# Patient Record
Sex: FEMALE | Race: WHITE | NOT HISPANIC OR LATINO | Employment: OTHER | URBAN - NONMETROPOLITAN AREA
[De-identification: names, ages, dates, MRNs, and addresses within clinical notes are randomized per-mention and may not be internally consistent; named-entity substitution may affect disease eponyms.]

---

## 2018-03-01 ENCOUNTER — OFFICE VISIT (OUTPATIENT)
Dept: ORTHOPEDIC SURGERY | Facility: CLINIC | Age: 78
End: 2018-03-01

## 2018-03-01 VITALS — WEIGHT: 146.4 LBS | BODY MASS INDEX: 26.94 KG/M2 | HEIGHT: 62 IN

## 2018-03-01 DIAGNOSIS — M17.0 PRIMARY OSTEOARTHRITIS OF BOTH KNEES: ICD-10-CM

## 2018-03-01 DIAGNOSIS — M25.561 RIGHT KNEE PAIN, UNSPECIFIED CHRONICITY: Primary | ICD-10-CM

## 2018-03-01 PROCEDURE — 99214 OFFICE O/P EST MOD 30 MIN: CPT | Performed by: ORTHOPAEDIC SURGERY

## 2018-03-01 PROCEDURE — 73560 X-RAY EXAM OF KNEE 1 OR 2: CPT | Performed by: ORTHOPAEDIC SURGERY

## 2018-03-01 RX ORDER — CLONAZEPAM 0.5 MG/1
TABLET ORAL
Refills: 0 | COMMUNITY
Start: 2018-02-02

## 2018-03-01 NOTE — PROGRESS NOTES
Chief Complaint   Patient presents with   • Right Knee - Follow-up           HPI the patient is here today to discuss right knee surgery. Patient is having increasing pain and discomfort with the right knee.  The pain is over the medial aspect of the knee.  She has difficulty going up and down the steps.  Walking on a uneven surfaces bothers her significantly.  Her symptoms have been going on for at least 5-6 years.  She had right knee arthroscopy performed by Dr. Bocanegra which was not helpful in managing her symptoms at all.  In fact, she states that her knee has never been the same since the knee arthroscopy.  She wants to go ahead with a total knee arthroplasty to improve her quality of life at this point.  She does not want intra-articular injections of steroid because they helped her only on a temporary fashion.  The patient cannot take anti-inflammatory medications or pain medication because of severe issues of gastritis and has been prohibited by her primary care physician to take any of those medication.  In fact, postoperatively, there will be an issue because she cannot take any oral medications.  We will have to manage her symptoms with a combination of IV medication and transdermal patches or rectal suppositories.         There were no vitals filed for this visit.        Review of Systems   Constitutional: Negative.    HENT: Negative.    Eyes: Negative.    Respiratory: Negative.    Cardiovascular: Negative.    Gastrointestinal: Negative.    Endocrine: Negative.    Genitourinary: Negative.    Musculoskeletal: Positive for gait problem and joint swelling.   Skin: Negative.    Allergic/Immunologic: Negative.    Hematological: Negative.    Psychiatric/Behavioral: Negative.            Physical Exam   Constitutional: She is oriented to person, place, and time. She appears well-nourished.   HENT:   Head: Atraumatic.   Eyes: EOM are normal.   Neck: Neck supple.   Cardiovascular: Normal heart sounds.     Pulmonary/Chest: Breath sounds normal.   Abdominal: Bowel sounds are normal.   Musculoskeletal: She exhibits edema, tenderness and deformity.   Neurological: She is alert and oriented to person, place, and time. She has normal reflexes.   Skin: Skin is dry.   Psychiatric: She has a normal mood and affect. Her behavior is normal. Judgment and thought content normal.   Nursing note and vitals reviewed.          Joint/Body Part Specific Exam:  right knee. Patient has crepitus throughout range of motion. Positive patellar grind test. Mild effusion. Lachman is negative. Pivot shift is negative. Anterior and posterior drawer signs are negative. Significant joint line tenderness is noted on the medial aspect of the knee. Patient has a varus orientation of the knee. There is fullness and tenderness in the Popliteal fossa. Mild distention of a Popliteal cyst is noted in this location. Range of motion in flexion is from 0- 110 degrees. Neurovascular status is intact.  Dorsalis pedis and posterior tibial artery pulses are palpable. Common peroneal nerve function is well preserved. Patient's gait is cautious and antalgic. Skin and soft tissues are mildly swollen, consistent with synovitis and effusion. The patient has a significant limp with the first few steps after starting the gait cycle. Getting out of a chair takes a lot of effort due to pain on knee flexion.      X-RAY Report:  Knee films from  the previous visit are reviewed and show a complete loss of medial joint space with osteophyte formation.    right Knee X-Ray  Indication: Pain with varus deformity of the knee  AP, Lateral views  Findings: A degenerative osteoarthritis of the knee with complete loss of medial joint space  no bony lesion  Soft tissues within normal limits  decreased joint spaces  Hardware appropriately positioned not applicable      yes prior studies available for comparison.    X-RAY was ordered and reviewed by Ryan Chow  MD  Diagnostics:        Eve was seen today for follow-up.    Diagnoses and all orders for this visit:    Right knee pain, unspecified chronicity  -     XR Knee 1 or 2 View Right    Primary osteoarthritis of both knees          Procedures        Plan:  Use a supportive brace to prevent the knee from buckling and giving out.    Tablet ibuprofen 600 mg orally twice a day for pain swelling and discomfort.    She states that she cannot take any NSAIDs or pain medication because of upper GI issues including bleeding.    Home-based exercise program with stretching and strengthening exercises of the quads and the hamstrings.    Patient wants to go ahead and improve her pain profile and her quality of life and therefore wants to now proceed with a total knee arthroplasty.    Issues with regards to the performance of the right total knee arthroplasty discussed with the patient in great detail.    Time spent in the office today with the patient is 30 minutes of which greater than 50% of my time was spent face-to-face counseling the patient about all the questions regarding the knee replacement and the modalities of care because she cannot take any pain medication by mouth.  We discussed the options of trans-dermal patches as well as rectal suppositories to help manage her pain and symptoms.    The patient was seen today for preoperative discussion.  The patient has been tried on over-the-counter and prescription NSAID's despite the risks of anti-inflammatory bleeding, peptic ulcers and erosive gastritis with short term benefit only.  Braces have been prescribed for mechanical support.  Patient has been participating in an exercise program specifically targeting joint pain relief with limited benefit. Intraarticular injections have been used periodically with some but not complete relief of pain.  Ambulation aids have also been utilized.      The details of the surgical procedure were explained including the location of  probable incisions and a description of the likely hardware/grafts to be used. The patient understands the likely convalescence after surgery as well as the rehabilitation required.  Also, we have thoroughly discussed with the patient the risks, benefits and alternatives to surgery.  Risks include but are not limited to the risk of infection, joint stiffness, limited range of motion, wound healing problems, scar tissue build up, myocardial infarction, stroke, blood clots (including DVT and/or pulmonary embolus along with the risk of death) neurologic and/or vascular injury, limb length discrepancy, fracture, dislocation, nonunion, malunion, continued pain and need for further surgery including hardware failure requiring revision.

## 2018-03-19 ENCOUNTER — TELEPHONE (OUTPATIENT)
Dept: ORTHOPEDIC SURGERY | Facility: CLINIC | Age: 78
End: 2018-03-19

## 2018-03-19 NOTE — TELEPHONE ENCOUNTER
Patient has had something else come up and does not want to be scheduled for surgery at this time. She will call back when she wants to proceed/rj

## 2018-04-10 ENCOUNTER — OFFICE VISIT (OUTPATIENT)
Dept: ORTHOPEDIC SURGERY | Facility: CLINIC | Age: 78
End: 2018-04-10

## 2018-04-10 VITALS — WEIGHT: 144 LBS | HEIGHT: 62 IN | BODY MASS INDEX: 26.5 KG/M2 | TEMPERATURE: 97.6 F

## 2018-04-10 DIAGNOSIS — M25.551 PAIN OF RIGHT HIP JOINT: Primary | ICD-10-CM

## 2018-04-10 PROCEDURE — 99213 OFFICE O/P EST LOW 20 MIN: CPT | Performed by: ORTHOPAEDIC SURGERY

## 2018-04-10 PROCEDURE — 73501 X-RAY EXAM HIP UNI 1 VIEW: CPT | Performed by: ORTHOPAEDIC SURGERY

## 2018-04-10 NOTE — PROGRESS NOTES
Chief Complaint   Patient presents with   • Right Hip - Pain             HPI  Established patient returns with right hip pain. She states that the pain is on the lateral aspect of her right hip.  She does have some left hip pain as well but it is very minimal.  The pain radiates from the base of the greater trochanter to the lateral side of the femur.  She denies any history of trauma.  She does not have any risk factors for avascular necrosis.  He does have symptoms of radiculopathy which radiated down the lower extremity.  By the end of the day she has a fairly significant limp because of the pain and discomfort.          Allergies   Allergen Reactions   • Pain & Fever [Acetaminophen] GI Intolerance     All pain medications hurts the patient stomach. Can only take tylenol - that sometimes upsets her stomach-         Social History     Social History   • Marital status:      Spouse name: N/A   • Number of children: N/A   • Years of education: N/A     Occupational History   • Not on file.     Social History Main Topics   • Smoking status: Never Smoker   • Smokeless tobacco: Not on file   • Alcohol use No   • Drug use: Unknown   • Sexual activity: Not on file     Other Topics Concern   • Not on file     Social History Narrative   • No narrative on file       History reviewed. No pertinent family history.    Past Surgical History:   Procedure Laterality Date   • APPENDECTOMY     • CHOLECYSTECTOMY     • HYSTERECTOMY         Past Medical History:   Diagnosis Date   • Osteoporosis            Vitals:    04/10/18 1440   Temp: 97.6 °F (36.4 °C)             Review of Systems   Constitutional: Negative.    HENT: Negative.    Eyes: Negative.    Respiratory: Negative.    Cardiovascular: Negative.    Gastrointestinal: Negative.    Endocrine: Negative.    Genitourinary: Negative.    Musculoskeletal: Positive for gait problem and joint swelling.   Skin: Negative.    Allergic/Immunologic: Negative.    Hematological:  Negative.    Psychiatric/Behavioral: Negative.            Physical Exam   Constitutional: She is oriented to person, place, and time. She appears well-nourished.   HENT:   Head: Atraumatic.   Eyes: EOM are normal.   Neck: Neck supple.   Cardiovascular: Normal rate, regular rhythm, normal heart sounds and intact distal pulses.    Pulmonary/Chest: Breath sounds normal.   Abdominal: Bowel sounds are normal.   Musculoskeletal: She exhibits edema and tenderness.   Neurological: She is alert and oriented to person, place, and time. She has normal reflexes.   Skin: Skin is warm.   Psychiatric: She has a normal mood and affect. Her behavior is normal. Judgment and thought content normal.   Nursing note and vitals reviewed.              Joint/Body Part Specific Exam:  bilateral hip. Neurovascular status is intact. Patient has a distant limp on the affected side. Internal and external rotations are associated with pain and discomfort. Anterior joint line pain and tenderness is significant. Stinchfield sign is positive. Figure of 4 sign is positive. Patient is unable to perform an active straight leg raise exam. Greater trochanter is tender. Crossover adduction test is positive. Cross body adduction is limited and painful for the patient. Patient has very significant limp and joint line tenderness anteriorly, posteriorly and medially. Dorsalis pedis and posterior tibial artery pulses are palpable. Common peroneal nerve function is well preserved.          X-RAY Report:  right Hip X-Ray  Indication: Pain on the lateral aspect of the right hip  AP and lateral  Findings: Moderately advanced osteoarthritis with narrowing of the lateral joint space  no bony lesion  Soft tissues within normal limits  within normal limits joint spaces  Hardware appropriately positioned not applicable      no prior studies available for comparison.    X-RAY was ordered and reviewed by Ryan Chow MD              Diagnostics:            Eve was seen  today for pain.    Diagnoses and all orders for this visit:    Pain of right hip joint  -     XR Hip With or Without Pelvis 1 View Right            Procedures        PLAN:  I provided this patient with educational materials regarding bone health.  Tablet ibuprofen 600 mg orally twice a day for pain swelling and discomfort.    Stretching and strengthening exercises of the hip including the IT band.    Calcium and vitamin D for bone health.    Steroid injection to the base of the greater trochanter discussed with the patient and at next visit I will be glad to provide her with that service based on the progression of her pain and discomfort.    Nonoperative care were the hip at this point but eventually she might need a hip replacement surgery.    Follow-up in my office in 3 months.    [employer] exercise program for the lumbar spine.          CC To [facility] [place]

## 2018-04-17 PROBLEM — M25.551 PAIN OF RIGHT HIP JOINT: Status: ACTIVE | Noted: 2018-04-17

## 2018-07-17 ENCOUNTER — OFFICE VISIT (OUTPATIENT)
Dept: ORTHOPEDIC SURGERY | Facility: CLINIC | Age: 78
End: 2018-07-17

## 2018-07-17 VITALS — BODY MASS INDEX: 24.84 KG/M2 | HEIGHT: 62 IN | WEIGHT: 135 LBS

## 2018-07-17 DIAGNOSIS — M17.0 PRIMARY OSTEOARTHRITIS OF BOTH KNEES: Primary | ICD-10-CM

## 2018-07-17 PROCEDURE — 99214 OFFICE O/P EST MOD 30 MIN: CPT | Performed by: ORTHOPAEDIC SURGERY

## 2018-07-17 RX ORDER — OMEPRAZOLE 40 MG/1
CAPSULE, DELAYED RELEASE ORAL
COMMUNITY
Start: 2018-05-01

## 2018-07-17 NOTE — PROGRESS NOTES
Chief Complaint   Patient presents with   • Right Knee - Follow-up           HPI  The patient is here today for a follow up of her right knee Pain and discomfort and progressive deformity.  Although she has symptoms on both her knees, the right side is more symptomatic than the left.  She has a progressive varus deformity of both the knees.  She has difficulty in going up and down the steps.  She has difficulty in squatting on the ground.  She states that she cannot walk without pain and therefore she cannot exercise.  She would like to get outside the house and improve her cardiovascular conditioning.  The patient states that anti-inflammatory medication is no longer helping her manage her symptoms and she would like to consider knee replacement surgery at this point.  The pain bothers her when she is walking up and down the steps and also when she is squatting on the ground.  Occasionally, the knee will buckle and give out from underneath her.      There were no vitals filed for this visit.        Review of Systems   Constitutional: Negative.    HENT: Negative.    Eyes: Negative.    Respiratory: Negative.    Cardiovascular: Negative.    Gastrointestinal: Negative.    Endocrine: Negative.    Genitourinary: Negative.    Musculoskeletal: Positive for gait problem and joint swelling.   Skin: Negative.    Allergic/Immunologic: Negative.    Hematological: Negative.    Psychiatric/Behavioral: Negative.            Physical Exam   Constitutional: She is oriented to person, place, and time. She appears well-developed and well-nourished.   HENT:   Head: Atraumatic.   Eyes: EOM are normal.   Neck: Neck supple.   Cardiovascular: Regular rhythm, normal heart sounds and intact distal pulses.    Pulmonary/Chest: Breath sounds normal.   Abdominal: Bowel sounds are normal.   Musculoskeletal: She exhibits edema and tenderness.   Neurological: She is alert and oriented to person, place, and time.   Skin: Skin is warm. Capillary  refill takes 2 to 3 seconds.   Psychiatric: She has a normal mood and affect. Her behavior is normal. Thought content normal.   Nursing note and vitals reviewed.          Joint/Body Part Specific Exam:  bilateral knee. Patient has crepitus throughout range of motion. Positive patellar grind test. Mild effusion. Lachman is negative. Pivot shift is negative. Anterior and posterior drawer signs are negative. Significant joint line tenderness is noted on the medial aspect of the knee. Patient has a varus orientation of the knee. There is fullness and tenderness in the Popliteal fossa. Mild distention of a Popliteal cyst is noted in this location. Range of motion in flexion is from 0- 110 degrees. Neurovascular status is intact.  Dorsalis pedis and posterior tibial artery pulses are palpable. Common peroneal nerve function is well preserved. Patient's gait is cautious and antalgic. Skin and soft tissues are mildly swollen, consistent with synovitis and effusion. The patient has a significant limp with the first few steps after starting the gait cycle. Getting out of a chair takes a lot of effort due to pain on knee flexion.  The patient's symptoms and clinical findings are much more marked on the right knee as opposed to the left side.      X-RAY Report:  Previously obtained x-rays are reviewed and show a complete loss of medial joint space with osteophyte formation.  The patellofemoral distance is completely obliterated as well.      Diagnostics:        Eve was seen today for follow-up.    Diagnoses and all orders for this visit:    Primary osteoarthritis of both knees            Procedures        Plan:  She has tried several modalities of nonoperative care including injections to the knee as well as Synvisc Visco supplementation.    She now wants to proceed with total knee arthroplasty because her symptoms are progressively getting worse.  She has difficulty going up and down steps and would like to walk for  exercise.    Tablet ibuprofen 600 mg orally twice a day for pain swelling and discomfort.    We will go ahead and schedule her for a right total knee arthroplasty in the near future.    Extensive discussion with the patient about risks and benefits and potential complications of the surgical procedure.  Time spent in the office today with the patient discussing these options.  Greater than 50% of my time was spent with the patient face-to-face going over the indications for the surgical decision making process.    The patient was seen today for preoperative discussion.  The patient has been tried on over-the-counter and prescription NSAID's despite the risks of anti-inflammatory bleeding, peptic ulcers and erosive gastritis with short term benefit only.  Braces have been prescribed for mechanical support.  Patient has been participating in an exercise program specifically targeting joint pain relief with limited benefit. Intraarticular injections have been used periodically with some but not complete relief of pain.  Ambulation aids have also been utilized.      The details of the surgical procedure were explained including the location of probable incisions and a description of the likely hardware/grafts to be used. The patient understands the likely convalescence after surgery as well as the rehabilitation required.  Also, we have thoroughly discussed with the patient the risks, benefits and alternatives to surgery.  Risks include but are not limited to the risk of infection, joint stiffness, limited range of motion, wound healing problems, scar tissue build up, myocardial infarction, stroke, blood clots (including DVT and/or pulmonary embolus along with the risk of death) neurologic and/or vascular injury, limb length discrepancy, fracture, dislocation, nonunion, malunion, continued pain and need for further surgery including hardware failure requiring revision.

## 2018-07-31 ENCOUNTER — TELEPHONE (OUTPATIENT)
Dept: ORTHOPEDIC SURGERY | Facility: CLINIC | Age: 78
End: 2018-07-31

## 2018-08-15 ENCOUNTER — TELEPHONE (OUTPATIENT)
Dept: ORTHOPEDIC SURGERY | Facility: CLINIC | Age: 78
End: 2018-08-15

## 2018-09-10 ENCOUNTER — OUTSIDE FACILITY SERVICE (OUTPATIENT)
Dept: ORTHOPEDIC SURGERY | Facility: CLINIC | Age: 78
End: 2018-09-10

## 2018-09-10 PROCEDURE — 27447 TOTAL KNEE ARTHROPLASTY: CPT | Performed by: ORTHOPAEDIC SURGERY

## 2018-09-10 PROCEDURE — 20985 CPTR-ASST DIR MS PX: CPT | Performed by: ORTHOPAEDIC SURGERY

## 2018-09-17 RX ORDER — OXYCODONE HYDROCHLORIDE AND ACETAMINOPHEN 5; 325 MG/1; MG/1
1 TABLET ORAL EVERY 6 HOURS PRN
Qty: 40 TABLET | Refills: 0 | Status: SHIPPED | OUTPATIENT
Start: 2018-09-17 | End: 2018-09-21 | Stop reason: SDUPTHER

## 2018-09-17 NOTE — TELEPHONE ENCOUNTER
Patient is requesting a refill of Percocet 5/325 MG she has about 9 pills left but wanted to make sure she did not run out. She is s/p TKA sx 9/10/18. Please Advise

## 2018-09-21 ENCOUNTER — OFFICE VISIT (OUTPATIENT)
Dept: ORTHOPEDIC SURGERY | Facility: CLINIC | Age: 78
End: 2018-09-21

## 2018-09-21 DIAGNOSIS — Z96.651 S/P TKR (TOTAL KNEE REPLACEMENT), RIGHT: Primary | ICD-10-CM

## 2018-09-21 PROCEDURE — 99024 POSTOP FOLLOW-UP VISIT: CPT | Performed by: ORTHOPAEDIC SURGERY

## 2018-09-21 RX ORDER — OXYCODONE HYDROCHLORIDE AND ACETAMINOPHEN 5; 325 MG/1; MG/1
1 TABLET ORAL EVERY 6 HOURS PRN
Qty: 40 TABLET | Refills: 0 | Status: SHIPPED | OUTPATIENT
Start: 2018-09-21 | End: 2018-10-04

## 2018-09-21 NOTE — PROGRESS NOTES
Chief Complaint   Patient presents with   • Right Knee - Post-op         HPI the patient is here today for a follow up of her right total knee arthroplasty that was done on 09/10/18.  The patient is doing extremely well after total knee arthroplasty.  Her range of motion has improved significantly.  The patient is participating in a physical therapy protocol.  There are no fevers, rigors or chills.  Refill is 2 seconds with a brisk return.  She has completed her course of xarelto for DVT prophylaxis.  She is very pleased with her outcome in terms of improvement mobility and decreased pain from the knee.        There were no vitals filed for this visit.        Joint/Body Part Specific Exam:  right knee.The patient is status post total knee arthroplasty postoperative 1.5 week(s). Incision is clean. Calf is soft and nontender. Homans sign is negative. There is no clicking, popping or catching. Anterior and posterior drawer signs are negative.  There is no instability of the components. Appropriate amounts of swelling and bruising are noted. Dorsalis pedis and posterior tibial artery pulses are palpable. Common peroneal nerve function is well preserved. Range of motion is from 10- 90° degrees of flexion. Gait is cautious but otherwise fairly normal. There is no evidence of a deep seated joint infection.      X-RAY REPORT:        Eve was seen today for post-op.    Diagnoses and all orders for this visit:    S/P TKR (total knee replacement), right  -     Ambulatory Referral to Physical Therapy Evaluate and treat    Other orders  -     oxyCODONE-acetaminophen (PERCOCET) 5-325 MG per tablet; Take 1 tablet by mouth Every 6 (Six) Hours As Needed (S/P TKA).            Procedures        Instructions: Incision care.    Ace wrap from toes to groin to help decrease the swelling and minimize the possibility of a DVT.    Tablet aspirin 325 mg tab 1 by mouth daily for DVT prophylaxis.    Tablet Percocet 5/325 mg tab 1 by mouth every  6 when necessary pain and discomfort total 40 pills no refills.    Continue with aggressive physical therapy to the knee following the total knee arthroplasty protocol.    Follow-up in my office in 4 weeks for reevaluation.    Controlled substance treatment options discussed in detail. Patient's signed consent to medical options on file. FREDERIC form in chart.  The patient is being provided this narcotic prescription to address the acute medical condition that they are undergoing/experiencing at this time.  It is my medical and surgical assessment that more than 3 days of narcotic medication is necessary to help control the pain and discomfort that this patient is experiencing at this point.  Risks of narcotic medication usage outlined.  Possibility of physical and psychological dependence and abuse, especially long term, emphasized to the patient.  I have explained to the patient that we will try to wean them off the narcotic medication as soon as possible and introduce non-narcotic modalities of pain control.  At this point in the patient's clinical spectrum, however, alternative available treatments are inadequate to control their pain and symptoms.  I have also discussed the possibility of random drug testing as well as pill counts to prevent misuse and misappropriation of the narcotic medications prescribed to the patient.      Plan:

## 2018-09-25 PROBLEM — Z96.651 S/P TKR (TOTAL KNEE REPLACEMENT), RIGHT: Status: ACTIVE | Noted: 2018-09-25

## 2018-10-04 RX ORDER — OXYCODONE HYDROCHLORIDE AND ACETAMINOPHEN 5; 325 MG/1; MG/1
TABLET ORAL
Qty: 40 TABLET | Refills: 0 | Status: SHIPPED | OUTPATIENT
Start: 2018-10-04 | End: 2018-10-16 | Stop reason: CLARIF

## 2018-10-04 NOTE — TELEPHONE ENCOUNTER
Patient is requesting a refill of pain medication. Percocet 5/325MG one by mouth 6-8 hours #40. She is requesting a physical therapy or to [facility]. Order has been faxed. Waiting for Dr. Chow to sign Rx

## 2018-10-16 RX ORDER — OXYCODONE HYDROCHLORIDE AND ACETAMINOPHEN 5; 325 MG/1; MG/1
1 TABLET ORAL EVERY 8 HOURS PRN
Qty: 40 TABLET | Refills: 0 | Status: SHIPPED | OUTPATIENT
Start: 2018-10-16

## 2018-10-16 NOTE — TELEPHONE ENCOUNTER
Okay to refill this medication for the patient.  Tablet Percocet 5/325 mg tab 1 by mouth every 8 for pain swelling and discomfort.  Total 40 pills no refills.

## 2018-10-19 ENCOUNTER — OFFICE VISIT (OUTPATIENT)
Dept: ORTHOPEDIC SURGERY | Facility: CLINIC | Age: 78
End: 2018-10-19

## 2018-10-19 DIAGNOSIS — Z96.651 S/P TKR (TOTAL KNEE REPLACEMENT), RIGHT: Primary | ICD-10-CM

## 2018-10-19 PROCEDURE — 99024 POSTOP FOLLOW-UP VISIT: CPT | Performed by: ORTHOPAEDIC SURGERY

## 2018-10-19 RX ORDER — MELOXICAM 7.5 MG/1
7.5 TABLET ORAL NIGHTLY PRN
Qty: 90 TABLET | Refills: 0 | Status: SHIPPED | OUTPATIENT
Start: 2018-10-19 | End: 2019-01-14 | Stop reason: SDUPTHER

## 2018-10-19 NOTE — PROGRESS NOTES
Chief Complaint   Patient presents with   • Right Knee - Post-op   • Wound Check         HPI the patient is here today for a follow up of her right total knee arthroplasty that was done on 9/10/18.  The patient is doing very well postoperatively.  Her pain is very well controlled after the knee replacement.  She has no fevers, rigors or chills.  She is very pleased with her ability to walk without too much pain or discomfort.  She has completed her course of xarelto for DVT prophylaxis.  She is wanting to go back on her anti-inflammatory medication because the other joints are bothering her with inflammatory symptoms.        There were no vitals filed for this visit.        Joint/Body Part Specific Exam:  right knee.The patient is status post total knee arthroplasty postoperative 7 week(s). Incision is clean. Calf is soft and nontender. Homans sign is negative. There is no clicking, popping or catching. Anterior and posterior drawer signs are negative.  There is no instability of the components. Appropriate amounts of swelling and bruising are noted. Dorsalis pedis and posterior tibial artery pulses are palpable. Common peroneal nerve function is well preserved. Range of motion is from 0- 95 degrees of flexion. Gait is cautious but otherwise fairly normal. There is no evidence of a deep seated joint infection.      X-RAY REPORT:        Eve was seen today for wound check and post-op.    Diagnoses and all orders for this visit:    S/P TKR (total knee replacement), right    Other orders  -     meloxicam (MOBIC) 7.5 MG tablet; Take 1 tablet by mouth At Night As Needed for Moderate Pain .            Procedures        Instructions:      Plan: Incision care.    Continue with aggressive physical therapy including stretching and strengthening of the quads and the hamstrings.    Ace wrap from toes to groin to help decrease the swelling and minimize the possibility of a DVT.    Falls precaution.    Tablet mobic 7.5 mg tab 1  by mouth daily at bedtime for pain swelling and discomfort.    She is not asking for any narcotic medication at this point because she is doing quite well postoperatively.    Follow-up in my office in 6-8 weeks.

## 2018-10-29 NOTE — TELEPHONE ENCOUNTER
PATIENT IS REQUESTING SOMETHING FOR PAIN. SHE SAYS PHYSICAL THERAPY IS REALLY CHALLENGING FOR HER.     PLEASE ADVISE

## 2018-10-29 NOTE — TELEPHONE ENCOUNTER
Please call her in a prescription of Norco 5/325 mg tab 1 by mouth Q8 to 12 when necessary pain total 30 pills no refills.

## 2018-10-30 RX ORDER — HYDROCODONE BITARTRATE AND ACETAMINOPHEN 5; 325 MG/1; MG/1
TABLET ORAL
Qty: 30 TABLET | Refills: 0 | Status: SHIPPED | OUTPATIENT
Start: 2018-10-30 | End: 2018-11-13 | Stop reason: SDUPTHER

## 2018-11-13 ENCOUNTER — TELEPHONE (OUTPATIENT)
Dept: ORTHOPEDIC SURGERY | Facility: CLINIC | Age: 78
End: 2018-11-13

## 2018-11-13 RX ORDER — HYDROCODONE BITARTRATE AND ACETAMINOPHEN 5; 325 MG/1; MG/1
TABLET ORAL
Qty: 30 TABLET | Refills: 0 | Status: SHIPPED | OUTPATIENT
Start: 2018-11-13

## 2018-11-13 NOTE — TELEPHONE ENCOUNTER
Please send me a request through electronic prescription and I will check off on it with my phone thank you

## 2018-11-13 NOTE — TELEPHONE ENCOUNTER
PATIENT CALLED AND IS REQUESTING A REFILL ON HER PAIN MEDICATION (HYDROCODONE 5/325).  PATIENT IS S/P RIGHT TOTAL KNEE REPLACEMENT ON 9/10/18./KOF

## 2018-12-07 ENCOUNTER — OFFICE VISIT (OUTPATIENT)
Dept: ORTHOPEDIC SURGERY | Facility: CLINIC | Age: 78
End: 2018-12-07

## 2018-12-07 DIAGNOSIS — Z96.651 S/P TKR (TOTAL KNEE REPLACEMENT), RIGHT: Primary | ICD-10-CM

## 2018-12-07 DIAGNOSIS — M25.551 PAIN OF RIGHT HIP JOINT: ICD-10-CM

## 2018-12-07 PROCEDURE — 99024 POSTOP FOLLOW-UP VISIT: CPT | Performed by: ORTHOPAEDIC SURGERY

## 2018-12-07 NOTE — PROGRESS NOTES
Chief Complaint   Patient presents with   • Right Knee - Post-op         HPI  The patient is here today for a follow up of her right total knee arthroplasty that was done on 9/10/18.  The patient has done well status post total knee arthroplasty.  Range of motion is progressively improving.  She is in a physical therapy program which is helping her to improve her range of motion and to increase her walking distance.  She states that she is very pleased with her postoperative outcome.  The patient states that she is having pain and discomfort on the base of the right hip over the greater trochanteric bursa.  Cross body activities bother the patient to some degree.  Symptoms are worse on the lateral side of the femur.  She has a difficult time sleeping in bed at night because of the trochanteric bursal pain.      There were no vitals filed for this visit.        Joint/Body Part Specific Exam:right knee.The patient is status post total knee arthroplasty postoperative 3 month(s). Incision is clean. Calf is soft and nontender. Homans sign is negative. There is no clicking, popping or catching. Anterior and posterior drawer signs are negative.  There is no instability of the components. Appropriate amounts of swelling and bruising are noted. Dorsalis pedis and posterior tibial artery pulses are palpable. Common peroneal nerve function is well preserved. Range of motion is from 10- 120 degrees of flexion. Gait is cautious but otherwise fairly normal. There is no evidence of a deep seated joint infection.  Right hip:  Skin and soft tissues over the greater trochanteric bursa are painful and tender for the patient. Neurovascular status is intact. IT band is painful and tender. Cross body adduction bothers the patient significantly. Internal and external rotations bother the patient significantly with tenderness over the greater trochanter. There is no clinical deformity. No shortening. The patient does have a significant  limp because of the trochanteric pain caused by the abductors. The piriformis is tight and with any rotation there is significant capsular tightness. Dorsalis pedis and posterior tibal artery pulses are palpable. Capillary refill is 2 seconds with a brisk return. Common peroneal nerve function is well preserved.    X-RAY REPORT:        Eve was seen today for post-op.    Diagnoses and all orders for this visit:    S/P TKR (total knee replacement), right    Pain of right hip joint            Procedures        Instructions:      Plan: Stretching exercises of the quads and the hamstrings to improve the range of motion of the knee.    Continue with the rehabilitation of the total knee arthroplasty.    Falls precautions.    Tablet ibuprofen 600 mg orally twice a day for pain swelling and discomfort.    Stretching exercises of the hip and the IT band to decrease the pain profiles over the greater trochanteric bursa.    Steroid injection offered to the patient for the trochanteric bursal pain on the right hip.    Follow-up in my office in 3 months for reevaluation of the knee arthroplasty and possible steroid injection to the hip over the greater trochanteric bursa.

## 2019-01-14 RX ORDER — MELOXICAM 7.5 MG/1
7.5 TABLET ORAL NIGHTLY PRN
Qty: 90 TABLET | Refills: 0 | Status: SHIPPED | OUTPATIENT
Start: 2019-01-14 | End: 2019-04-13 | Stop reason: SDUPTHER

## 2019-03-14 ENCOUNTER — OFFICE VISIT (OUTPATIENT)
Dept: ORTHOPEDIC SURGERY | Facility: CLINIC | Age: 79
End: 2019-03-14

## 2019-03-14 DIAGNOSIS — Z96.651 S/P TKR (TOTAL KNEE REPLACEMENT), RIGHT: Primary | ICD-10-CM

## 2019-03-14 PROCEDURE — 99213 OFFICE O/P EST LOW 20 MIN: CPT | Performed by: ORTHOPAEDIC SURGERY

## 2019-03-14 NOTE — PROGRESS NOTES
FOLLOW UP VISIT    Eve Fung:  ?  1940:  ?  Chief Complaint   Patient presents with   • Right Knee - Follow-up      ?  HPI:  Patient presents today for 6 month follow up of right total knee arthroplasty. Incision has healed nicely without any sign of infection. She is progressing appropriately but does complain of a little ache in the knee.  She has done very well with her knee replacement which was performed by me in September 2018.  She states her quality of life improved tremendously after the knee surgery.  She is able to walk for exercise and does not have any issues with the knee replacement itself.  She is having some chronic low back pain issues with radiculopathy radiating into the right lower extremity.  The patient states that she would like to be referred to the pain clinic for [facility] to manage her symptoms.  I certainly agree with that line of management.    This patient is an established patient.  This problem is not new to this examiner.    Review of Systems   Constitutional: Negative.  Negative for fever.   Eyes: Negative.    Respiratory: Negative.    Cardiovascular: Negative.    Endocrine: Negative.    Musculoskeletal: Positive for arthralgias. Negative for gait problem and joint swelling.   Skin: Negative.  Negative for rash and wound.   Allergic/Immunologic: Negative.    Neurological: Negative for numbness.   Hematological: Negative.    Psychiatric/Behavioral: Negative.            Physical Exam   Constitutional: Patient is oriented to person, place, and time. Appears well-developed and well-nourished.   HENT:   Head: Normocephalic and atraumatic.   Eyes: Conjunctivae and EOM are normal. Pupils are equal, round, and reactive to light.   Cardiovascular: Normal rate, regular rhythm, normal heart sounds and intact distal pulses.   Pulmonary/Chest: Effort normal and breath sounds normal.   Musculoskeletal:   See detailed exam below   Neurological: Alert and oriented to person, place, and time.  No sensory deficit. Coordination normal.   Skin: Skin is warm and dry. Capillary refill takes less than 2 seconds. No rash noted. No erythema.   Psychiatric: Patient has a normal mood and affect. Her behavior is normal. Judgment and thought content normal.   Nursing note and vitals reviewed.    Ortho Exam:  Right knee.The patient is status post total knee arthroplasty postoperative 6 month(s). Incision is clean. Calf is soft and nontender. Homans sign is negative. There is no clicking, popping or catching. Anterior and posterior drawer signs are negative.  There is no instability of the components. Appropriate amounts of swelling and bruising are noted. Dorsalis pedis and posterior tibial artery pulses are palpable. Common peroneal nerve function is well preserved. Range of motion is from 0- 120 degrees of flexion. Gait is cautious but otherwise fairly normal. There is no evidence of a deep seated joint infection.    Lumbar Spine: The overlying skin and soft tissues are mildly swollen. Deep tendon reflexes are bilaterally, symmetric and equal.  No long tract signs are noted. There is No evidence of myelopathy. No bowel or bladder deficit noted. Mild spasm of erector spinae muscle is noted. right sided rotation is associated with pain and discomfort. Straight leg raise test is positive to 60 degrees. Contralateral straight leg raise is negative. Pain radiates to buttock and thigh. Get up and go is slow due to the lumbar pain.No objective motor or sensory function loss is noted.   Diagnostics:  None     Assessment:  Eve was seen today for follow-up.    Diagnoses and all orders for this visit:    S/P TKR (total knee replacement), right    ?  ?  Plan  [employer] exercise program.    , GI and dental procedure prophylaxis with antibiotics to prevent metastatic infection of the knee arthroplasty implants.  · Stretching and strengthening exercises of the quads and hamstrings   · Falls precautions  · Rest, ice,  compression, and elevation (RICE) therapy  · OTC Alternate Ibuprofen and Tylenol as needed  · Follow up in 6 month(s)      Ryan Chow MD  3/24/2019

## 2019-04-15 RX ORDER — MELOXICAM 7.5 MG/1
7.5 TABLET ORAL NIGHTLY PRN
Qty: 90 TABLET | Refills: 0 | Status: SHIPPED | OUTPATIENT
Start: 2019-04-15

## 2019-09-12 ENCOUNTER — OFFICE VISIT (OUTPATIENT)
Dept: ORTHOPEDIC SURGERY | Facility: CLINIC | Age: 79
End: 2019-09-12

## 2019-09-12 VITALS — HEIGHT: 62 IN | BODY MASS INDEX: 25.32 KG/M2 | WEIGHT: 137.6 LBS

## 2019-09-12 DIAGNOSIS — Z96.651 S/P TKR (TOTAL KNEE REPLACEMENT), RIGHT: Primary | ICD-10-CM

## 2019-09-12 PROCEDURE — 99213 OFFICE O/P EST LOW 20 MIN: CPT | Performed by: ORTHOPAEDIC SURGERY

## 2019-09-12 NOTE — PROGRESS NOTES
"FOLLOW UP VISIT    Patient: Eve Fung  ?  YOB: 1940    MRN: [id number]  ?  Chief Complaint   Patient presents with   • Right Knee - Follow-up      ?  HPI: The patient is following up on a right knee arthroplasty which was performed by me in September 2018.  She is now 1 year postop and is doing extremely well.  She states that she is very pleased with her outcome.  She states that her range of motion is improved considerably and she has only episodic soreness.  She states that she does not even remember that she had the knee replacement because her function is near normal.  \"I walk very well after the knee replacement surgery \".  She is here for yearly joint surveillance and evaluation.    Pain Location: right knee(s)  Radiation: none  Quality: dull  Intensity/Severity: None  Timing: intermittent  Aggravating Factors: kneeling, walking/running, squatting, walking or standing for prolonged time  Alleviating Factors: OTC analgesics, NSAID's  Previous Episodes: yes  Associated Symptoms: swelling  ADLs Affected: recreational activities/sports  Previous Treatment: Total knee arthroplasty performed by me on 18 September 2018.    This patient is an established patient.  This problem is not new to this examiner.      Allergies:   Allergies   Allergen Reactions   • Pain & Fever [Acetaminophen] GI Intolerance     All pain medications hurts the patient stomach. Can only take tylenol - that sometimes upsets her stomach-       Medications:   Home Medications:  Current Outpatient Medications on File Prior to Visit   Medication Sig   • acetaminophen (TYLENOL) 325 MG tablet Take 650 mg by mouth every 6 (six) hours as needed for mild pain (1-3).   • clonazePAM (KlonoPIN) 0.5 MG tablet TK 1/2 T PO TID PRN   • HYDROcodone-acetaminophen (NORCO) 5-325 MG per tablet Take one tablet by mouth every 8 to 12 hours prn pain   • meloxicam (MOBIC) 7.5 MG tablet TAKE 1 TABLET BY MOUTH AT NIGHT AS NEEDED FOR MODERATE PAIN   • " "omeprazole (priLOSEC) 40 MG capsule    • oxyCODONE-acetaminophen (PERCOCET) 5-325 MG per tablet Take 1 tablet by mouth Every 8 (Eight) Hours As Needed for Moderate Pain  or Severe Pain .     No current facility-administered medications on file prior to visit.      Current Medications:  Scheduled Meds:  PRN Meds:.    I have reviewed the patient's medical history in detail and updated the computerized patient record.  Review and summarization of old records include:    Past Medical History:   Diagnosis Date   • Osteoporosis      Past Surgical History:   Procedure Laterality Date   • APPENDECTOMY     • CHOLECYSTECTOMY     • HYSTERECTOMY       Social History     Occupational History   • Not on file   Tobacco Use   • Smoking status: Never Smoker   Substance and Sexual Activity   • Alcohol use: No   • Drug use: Not on file   • Sexual activity: Not on file      Social History     Social History Narrative   • Not on file     No family history on file.      Review of Systems   Constitutional: Negative.  Negative for fever.   HENT: Negative.    Eyes: Negative.    Respiratory: Negative.    Cardiovascular: Negative.    Endocrine: Negative.    Genitourinary: Negative.    Musculoskeletal: Positive for arthralgias, gait problem and joint swelling.   Skin: Negative.  Negative for rash and wound.   Allergic/Immunologic: Negative.    Neurological: Negative for numbness.   Hematological: Negative.    Psychiatric/Behavioral: Negative.           Wt Readings from Last 3 Encounters:   09/12/19 62.4 kg (137 lb 9.6 oz)   07/17/18 61.2 kg (135 lb)   04/10/18 65.3 kg (144 lb)     Ht Readings from Last 3 Encounters:   09/12/19 157.5 cm (62\")   07/17/18 157.5 cm (62\")   04/10/18 157.5 cm (62\")     Body mass index is 25.17 kg/m².  Facility age limit for growth percentiles is 20 years.  There were no vitals filed for this visit.      Physical Exam  Constitutional: Patient is oriented to person, place, and time. Appears well-developed and " well-nourished.   HENT:   Head: Normocephalic and atraumatic.   Eyes: Conjunctivae and EOM are normal. Pupils are equal, round, and reactive to light.   Cardiovascular: Normal rate, regular rhythm, normal heart sounds and intact distal pulses.   Pulmonary/Chest: Effort normal and breath sounds normal.   Musculoskeletal:   See detailed exam below   Neurological: Alert and oriented to person, place, and time. No sensory deficit. Coordination normal.   Skin: Skin is warm and dry. Capillary refill takes less than 2 seconds. No rash noted. No erythema.   Psychiatric: Patient has a normal mood and affect. Her behavior is normal. Judgment and thought content normal.   Nursing note and vitals reviewed.      Ortho Exam:     Right knee.The patient is status post total knee arthroplasty postoperative 1 year(s). Incision is clean. Calf is soft and nontender. Homans sign is negative. There is no clicking, popping or catching. Anterior and posterior drawer signs are negative.  There is no instability of the components. Appropriate amounts of swelling and bruising are noted. Dorsalis pedis and posterior tibial artery pulses are palpable. Common peroneal nerve function is well preserved. Range of motion is from 0-125 degrees of flexion. Gait is cautious but otherwise fairly normal. There is no evidence of a deep seated joint infection.      Diagnostics:  no diagnostic testing performed this visit      Assessment:  Eve was seen today for follow-up.    Diagnoses and all orders for this visit:    S/P TKR (total knee replacement), right          Procedures  ?    Plan    · Compression/brace to the opposite knee if needed for support.  · , GI and dental procedure prophylaxis with antibiotics to prevent metastatic infection to the right knee arthroplasty implants.  · Rest, ice, compression, and elevation (RICE) therapy  · Stretching and strengthening exercises of the quads and the hamstrings.  · Tylenol 500-1000mg by mouth every 6  hours as needed for pain   · Follow up in 1 year(s)    Date of encounter: 09/12/2019   Ryan Chow MD

## 2020-02-20 ENCOUNTER — OFFICE VISIT (OUTPATIENT)
Dept: ORTHOPEDIC SURGERY | Facility: CLINIC | Age: 80
End: 2020-02-20

## 2020-02-20 VITALS — BODY MASS INDEX: 26.43 KG/M2 | WEIGHT: 140 LBS | TEMPERATURE: 98 F | HEIGHT: 61 IN

## 2020-02-20 DIAGNOSIS — Z96.651 S/P TKR (TOTAL KNEE REPLACEMENT), RIGHT: Primary | ICD-10-CM

## 2020-02-20 DIAGNOSIS — M18.11 ARTHRITIS OF CARPOMETACARPAL (CMC) JOINT OF RIGHT THUMB: ICD-10-CM

## 2020-02-20 PROCEDURE — 99213 OFFICE O/P EST LOW 20 MIN: CPT | Performed by: ORTHOPAEDIC SURGERY

## 2020-02-20 PROCEDURE — 20600 DRAIN/INJ JOINT/BURSA W/O US: CPT | Performed by: ORTHOPAEDIC SURGERY

## 2020-02-20 RX ADMIN — METHYLPREDNISOLONE ACETATE 80 MG: 80 INJECTION, SUSPENSION INTRA-ARTICULAR; INTRALESIONAL; INTRAMUSCULAR; SOFT TISSUE at 15:10

## 2020-02-20 RX ADMIN — LIDOCAINE HYDROCHLORIDE 2 ML: 10 INJECTION, SOLUTION EPIDURAL; INFILTRATION; INTRACAUDAL; PERINEURAL at 15:10

## 2020-02-20 NOTE — PROGRESS NOTES
FOLLOW UP VISIT    Patient: Eve Fung  ?  YOB: 1940    MRN: [id number]  ?  Chief Complaint   Patient presents with   • Right Knee - Follow-up   CC: Follow-up on right total knee arthroplasty and right thumb CMC joint arthritis.  ?  HPI:   Eve Reis patient is following up on a right total knee arthroplasty performed by me on 18 September 2018.  She states that her knee replacement is doing absolutely great.  She has completed her physical therapy and improved her range of motion of the knee joint to near normal.  She is not taking any pain medication for this knee pathology at this point.  She states that she is walking for exercise and is very pleased with her outcome up to this point.  She states that her right thumb bothers her to some degree.  She has difficulty with grasping objects and difficulty with repetitive use and turning of the upper extremity.  She has classic symptoms of CMC joint arthrosis.  There is a sense of grinding and crepitus at the base of the first metacarpal.  She does have a bony prominence which is most likely an osteophyte at the base of the first metacarpal as well.      Pain Location: right Thumb base finger  Radiation: none  Quality: dull, aching  Intensity/Severity: moderate  Duration: 6 months  Onset quality: gradual   Timing: intermittent  Aggravating Factors: rotating motion, repetitive motion  Alleviating Factors: NSAIDs  Previous Episodes: yes  Associated Symptoms: pain, swelling, clicking/popping  ADLs Affected: grooming/hygiene/toileting/personal care, meal preparation  Previous Treatment: Total knee arthroplasty performed by me about 5 months ago.    This patient is an established patient.  This problem is not new to this examiner.      Allergies:   Allergies   Allergen Reactions   • Pain & Fever [Acetaminophen] GI Intolerance     All pain medications hurts the patient stomach. Can only take tylenol - that sometimes upsets her stomach-        Medications:   Home Medications:  Current Outpatient Medications on File Prior to Visit   Medication Sig   • acetaminophen (TYLENOL) 325 MG tablet Take 650 mg by mouth every 6 (six) hours as needed for mild pain (1-3).   • clonazePAM (KlonoPIN) 0.5 MG tablet TK 1/2 T PO TID PRN   • HYDROcodone-acetaminophen (NORCO) 5-325 MG per tablet Take one tablet by mouth every 8 to 12 hours prn pain   • meloxicam (MOBIC) 7.5 MG tablet TAKE 1 TABLET BY MOUTH AT NIGHT AS NEEDED FOR MODERATE PAIN   • omeprazole (priLOSEC) 40 MG capsule    • oxyCODONE-acetaminophen (PERCOCET) 5-325 MG per tablet Take 1 tablet by mouth Every 8 (Eight) Hours As Needed for Moderate Pain  or Severe Pain .     No current facility-administered medications on file prior to visit.      Current Medications:  Scheduled Meds:  PRN Meds:.    I have reviewed the patient's medical history in detail and updated the computerized patient record.  Review and summarization of old records include:    Past Medical History:   Diagnosis Date   • Osteoporosis      Past Surgical History:   Procedure Laterality Date   • APPENDECTOMY     • CHOLECYSTECTOMY     • HYSTERECTOMY       Social History     Occupational History   • Not on file   Tobacco Use   • Smoking status: Never Smoker   Substance and Sexual Activity   • Alcohol use: No   • Drug use: Not on file   • Sexual activity: Not on file      History reviewed. No pertinent family history.      Review of Systems   Constitutional: Negative.  Negative for fever.   HENT: Negative.    Eyes: Negative.    Respiratory: Negative.    Cardiovascular: Negative.    Gastrointestinal: Negative.    Endocrine: Negative.    Genitourinary: Negative.    Musculoskeletal: Positive for arthralgias, gait problem and joint swelling.   Skin: Negative.  Negative for rash and wound.   Allergic/Immunologic: Negative.    Neurological: Negative for numbness.   Hematological: Negative.    Psychiatric/Behavioral: Negative.           Wt Readings from  "Last 3 Encounters:   02/20/20 63.5 kg (140 lb)   09/12/19 62.4 kg (137 lb 9.6 oz)   07/17/18 61.2 kg (135 lb)     Ht Readings from Last 3 Encounters:   02/20/20 154.9 cm (61\")   09/12/19 157.5 cm (62\")   07/17/18 157.5 cm (62\")     Body mass index is 26.45 kg/m².  Facility age limit for growth percentiles is 20 years.  Vitals:    02/20/20 1449   Temp: 98 °F (36.7 °C)         Physical Exam  Constitutional: Patient is oriented to person, place, and time. Appears well-developed and well-nourished.   HENT:   Head: Normocephalic and atraumatic.   Eyes: Conjunctivae and EOM are normal. Pupils are equal, round, and reactive to light.   Cardiovascular: Normal rate, regular rhythm, normal heart sounds and intact distal pulses.   Pulmonary/Chest: Effort normal and breath sounds normal.   Musculoskeletal:   See detailed exam below   Neurological: Alert and oriented to person, place, and time. No sensory deficit. Coordination normal.   Skin: Skin is warm and dry. Capillary refill takes less than 2 seconds. No rash noted. No erythema.   Psychiatric: Patient has a normal mood and affect. Her behavior is normal. Judgment and thought content normal.   Nursing note and vitals reviewed.      Ortho Exam:   Right hand-CMC joint. The patient is right hand dominant. Tenderness is present at base of the 1st metacarpal. Skin and soft tissues are mildly swollen. Flexor and extensor tendon function is well preserved. Capillary refill is 2 seconds with a brisk return. Axial lading of the joint causes crepitus and pain. Pinch strength is compromised. Slight subluxation of the 1st metacarpal base is noted. Neurovascular status is intact.    Right knee.The patient is status post total knee arthroplasty postoperative 5 month(s). Incision is clean. Calf is soft and nontender. Homans sign is negative. There is no clicking, popping or catching. Anterior and posterior drawer signs are negative.  There is no instability of the components. Appropriate " amounts of swelling and bruising are noted. Dorsalis pedis and posterior tibial artery pulses are palpable. Common peroneal nerve function is well preserved. Range of motion is from 0-120 degrees of flexion. Gait is cautious but otherwise fairly normal. There is no evidence of a deep seated joint infection.      Diagnostics:  no diagnostic testing performed this visit      Assessment:  Eve was seen today for follow-up.    Diagnoses and all orders for this visit:    S/P TKR (total knee replacement), right    Arthritis of carpometacarpal (CMC) joint of right thumb  -     Small Joint Arthrocentesis: R thumb CMC          Small Joint Arthrocentesis: R thumb CMC  Consent given by: patient  Site marked: site marked  Timeout: Immediately prior to procedure a time out was called to verify the correct patient, procedure, equipment, support staff and site/side marked as required   Supporting Documentation  Indications: pain   Procedure Details  Location: thumb - R thumb CMC  Preparation: Patient was prepped and draped in the usual sterile fashion  Needle size: 27 G  Approach: dorsal  Medications administered: 80 mg methylPREDNISolone acetate 80 MG/ML; 2 mL lidocaine PF 1% 1 %  Patient tolerance: patient tolerated the procedure well with no immediate complications        ?    Plan    · Compression/brace to the thumb to minimize the pressure over the base of the first metacarpal.  · , GI and dental procedure prophylaxis with antibiotics to prevent metastatic infection to the knee arthroplasty implants.  · CMC joint arthroplasty and partial trapezoid ectomy discussed with the patient and she tells me that she will let me know when she is ready for that form of surgical intervention based on progression of symptoms of the CMC joint arthrosis.  · Calcium and vitamin D for bone health.  · Glucosamine, chondroitin and turmeric for cartilage health.  · Rest, ice, compression, and elevation (RICE) therapy  · Stretching and  strengthening exercises of the quads and the hamstrings.  · Tylenol 500-1000mg by mouth every 6 hours as needed for pain   · Follow up in 3 month(s)    Date of encounter: 02/20/2020   Ryan Chow MD

## 2020-02-27 PROBLEM — M18.11 ARTHRITIS OF CARPOMETACARPAL (CMC) JOINT OF RIGHT THUMB: Status: ACTIVE | Noted: 2020-02-27

## 2020-02-27 RX ORDER — LIDOCAINE HYDROCHLORIDE 10 MG/ML
2 INJECTION, SOLUTION EPIDURAL; INFILTRATION; INTRACAUDAL; PERINEURAL
Status: COMPLETED | OUTPATIENT
Start: 2020-02-20 | End: 2020-02-20

## 2020-02-27 RX ORDER — METHYLPREDNISOLONE ACETATE 80 MG/ML
80 INJECTION, SUSPENSION INTRA-ARTICULAR; INTRALESIONAL; INTRAMUSCULAR; SOFT TISSUE
Status: COMPLETED | OUTPATIENT
Start: 2020-02-20 | End: 2020-02-20

## 2020-05-26 ENCOUNTER — OFFICE VISIT (OUTPATIENT)
Dept: ORTHOPEDIC SURGERY | Facility: CLINIC | Age: 80
End: 2020-05-26

## 2020-05-26 VITALS — TEMPERATURE: 98.3 F | WEIGHT: 134 LBS | HEIGHT: 62 IN | BODY MASS INDEX: 24.66 KG/M2

## 2020-05-26 DIAGNOSIS — M54.16 CHRONIC RADICULAR LUMBAR PAIN: ICD-10-CM

## 2020-05-26 DIAGNOSIS — M16.0 PRIMARY OSTEOARTHRITIS OF BOTH HIPS: ICD-10-CM

## 2020-05-26 DIAGNOSIS — Z96.651 S/P TKR (TOTAL KNEE REPLACEMENT), RIGHT: ICD-10-CM

## 2020-05-26 DIAGNOSIS — M25.551 PAIN OF RIGHT HIP JOINT: Primary | ICD-10-CM

## 2020-05-26 DIAGNOSIS — G89.29 CHRONIC RADICULAR LUMBAR PAIN: ICD-10-CM

## 2020-05-26 PROCEDURE — 99213 OFFICE O/P EST LOW 20 MIN: CPT | Performed by: ORTHOPAEDIC SURGERY

## 2020-05-26 PROCEDURE — 73502 X-RAY EXAM HIP UNI 2-3 VIEWS: CPT | Performed by: ORTHOPAEDIC SURGERY

## 2020-09-09 DIAGNOSIS — M25.531 BILATERAL WRIST PAIN: Primary | ICD-10-CM

## 2020-09-09 DIAGNOSIS — M25.532 BILATERAL WRIST PAIN: Primary | ICD-10-CM

## 2020-09-09 PROBLEM — M79.609 PAIN IN LIMB: Status: ACTIVE | Noted: 2019-02-25

## 2020-09-09 PROBLEM — G89.29 CHRONIC BACK PAIN: Status: ACTIVE | Noted: 2019-02-25

## 2020-09-09 PROBLEM — M51.36 DEGENERATION OF LUMBAR INTERVERTEBRAL DISC: Status: ACTIVE | Noted: 2020-09-09

## 2020-09-09 PROBLEM — F41.9 ANXIETY: Status: ACTIVE | Noted: 2019-02-25

## 2020-09-09 PROBLEM — M54.9 CHRONIC BACK PAIN: Status: ACTIVE | Noted: 2019-02-25

## 2020-09-10 ENCOUNTER — OFFICE VISIT (OUTPATIENT)
Dept: ORTHOPEDIC SURGERY | Facility: CLINIC | Age: 80
End: 2020-09-10

## 2020-09-10 ENCOUNTER — HOSPITAL ENCOUNTER (OUTPATIENT)
Dept: OTHER | Facility: HOSPITAL | Age: 80
Discharge: HOME | End: 2020-09-10
Attending: ORTHOPAEDIC SURGERY

## 2020-09-10 VITALS — BODY MASS INDEX: 24.48 KG/M2 | WEIGHT: 133 LBS | HEIGHT: 62 IN | TEMPERATURE: 98.2 F

## 2020-09-10 DIAGNOSIS — M18.11 ARTHRITIS OF CARPOMETACARPAL (CMC) JOINT OF RIGHT THUMB: Primary | ICD-10-CM

## 2020-09-10 DIAGNOSIS — M18.12 PRIMARY OSTEOARTHRITIS OF FIRST CARPOMETACARPAL JOINT OF LEFT HAND: ICD-10-CM

## 2020-09-10 PROCEDURE — 99213 OFFICE O/P EST LOW 20 MIN: CPT | Performed by: ORTHOPAEDIC SURGERY

## 2020-09-10 PROCEDURE — 20600 DRAIN/INJ JOINT/BURSA W/O US: CPT | Performed by: ORTHOPAEDIC SURGERY

## 2020-09-10 RX ORDER — METHYLPREDNISOLONE ACETATE 80 MG/ML
160 INJECTION, SUSPENSION INTRA-ARTICULAR; INTRALESIONAL; INTRAMUSCULAR; SOFT TISSUE
Status: COMPLETED | OUTPATIENT
Start: 2020-09-10 | End: 2020-09-10

## 2020-09-10 RX ORDER — LIDOCAINE HYDROCHLORIDE 10 MG/ML
2 INJECTION, SOLUTION INFILTRATION; PERINEURAL
Status: COMPLETED | OUTPATIENT
Start: 2020-09-10 | End: 2020-09-10

## 2020-09-10 RX ORDER — DULOXETIN HYDROCHLORIDE 30 MG/1
CAPSULE, DELAYED RELEASE ORAL
COMMUNITY
Start: 2020-06-16

## 2020-09-10 RX ADMIN — METHYLPREDNISOLONE ACETATE 160 MG: 80 INJECTION, SUSPENSION INTRA-ARTICULAR; INTRALESIONAL; INTRAMUSCULAR; SOFT TISSUE at 15:30

## 2020-09-10 RX ADMIN — LIDOCAINE HYDROCHLORIDE 2 ML: 10 INJECTION, SOLUTION INFILTRATION; PERINEURAL at 15:30

## 2020-09-10 NOTE — PROGRESS NOTES
NEW VISIT    Patient: Eve Fung  ?  YOB: 1940    MRN: [id number]  ?  Chief Complaint   Patient presents with   • Left Wrist - Pain   • Right Wrist - Pain   • Establish Care      ?  HPI: OPNC BILATERAL WRISTS  Eve Reis presents to the office with bilateral thumb pain and discomfort.  She has pain that radiates from the base of the CMC joint to the wrist joint.  The right side is more symptomatic than the left.  She is a very active woman and at age 80 is still actively using her upper extremities for recreational activities.  Pain is based on the CMC joint both on the dorsal as well as on the volar aspect of the CMC joint of both thumbs.  She has difficulty with  strength.  Fine motor activity is somewhat restricted because of limited pain strength.      Pain Location: BILATERAL wrist  Radiation: none  Quality: dull, aching  Intensity/Severity: mild   Duration: several  years  Onset quality: gradual   Timing: intermittent  Aggravating Factors: rotating motion, repetitive motion  Alleviating Factors: rest, ice  Previous Episodes: none mentioned  Associated Symptoms: pain, decreased strength  ADLs Affected: grooming/hygiene/toileting/personal care, recreational activities/sports  Previous Treatment: brace and ice    This patient is an established patient.  This problem is not new to this examiner.      Allergies:   Allergies   Allergen Reactions   • Pain & Fever [Acetaminophen] GI Intolerance     All pain medications hurts the patient stomach. Can only take tylenol - that sometimes upsets her stomach-       Medications:   Home Medications:  Current Outpatient Medications on File Prior to Visit   Medication Sig   • acetaminophen (TYLENOL) 325 MG tablet Take 650 mg by mouth every 6 (six) hours as needed for mild pain (1-3).   • clonazePAM (KlonoPIN) 0.5 MG tablet TK 1/2 T PO TID PRN   • DULoxetine (CYMBALTA) 30 MG capsule TK 1 C PO QD   • HYDROcodone-acetaminophen (NORCO) 5-325 MG per  "tablet Take one tablet by mouth every 8 to 12 hours prn pain   • meloxicam (MOBIC) 7.5 MG tablet TAKE 1 TABLET BY MOUTH AT NIGHT AS NEEDED FOR MODERATE PAIN   • omeprazole (priLOSEC) 40 MG capsule    • oxyCODONE-acetaminophen (PERCOCET) 5-325 MG per tablet Take 1 tablet by mouth Every 8 (Eight) Hours As Needed for Moderate Pain  or Severe Pain .     No current facility-administered medications on file prior to visit.      Current Medications:  Scheduled Meds:  PRN Meds:.    I have reviewed the patient's medical history in detail and updated the computerized patient record.  Review and summarization of old records include:    Past Medical History:   Diagnosis Date   • Osteoporosis      Past Surgical History:   Procedure Laterality Date   • APPENDECTOMY     • CHOLECYSTECTOMY     • HYSTERECTOMY       Social History     Occupational History   • Not on file   Tobacco Use   • Smoking status: Never Smoker   • Smokeless tobacco: Never Used   Substance and Sexual Activity   • Alcohol use: No   • Drug use: Not on file   • Sexual activity: Not on file      History reviewed. No pertinent family history.      Review of Systems   Constitutional: Negative.    HENT: Negative.    Eyes: Negative.    Respiratory: Negative.    Cardiovascular: Negative.    Endocrine: Negative.    Genitourinary: Negative.    Musculoskeletal: Positive for arthralgias and joint swelling.   Skin: Negative.    Allergic/Immunologic: Negative.    Neurological: Negative.    Hematological: Negative.    Psychiatric/Behavioral: Negative.           Wt Readings from Last 3 Encounters:   09/10/20 60.3 kg (133 lb)   05/26/20 60.8 kg (134 lb)   02/20/20 63.5 kg (140 lb)     Ht Readings from Last 3 Encounters:   09/10/20 157.5 cm (62\")   05/26/20 157.5 cm (62\")   02/20/20 154.9 cm (61\")     Body mass index is 24.33 kg/m².  Facility age limit for growth percentiles is 20 years.  Vitals:    09/10/20 1504   Temp: 98.2 °F (36.8 °C)         Physical Exam  Constitutional: " Patient is oriented to person, place, and time. Appears well-developed and well-nourished.   HENT:   Head: Normocephalic and atraumatic.   Eyes: Conjunctivae and EOM are normal. Pupils are equal, round, and reactive to light.   Cardiovascular: Normal rate, regular rhythm, normal heart sounds and intact distal pulses.   Pulmonary/Chest: Effort normal and breath sounds normal.   Musculoskeletal:   See detailed exam below   Neurological: Alert and oriented to person, place, and time. No sensory deficit. Coordination normal.   Skin: Skin is warm and dry. Capillary refill takes less than 2 seconds. No rash noted. No erythema.   Psychiatric: Patient has a normal mood and affect. Her behavior is normal. Judgment and thought content normal.   Nursing note and vitals reviewed.      Ortho Exam:   Bilateral hand-CMC joint. The patient is right hand dominant. Tenderness is present at base of the 1st metacarpal. Skin and soft tissues are mildly swollen. Flexor and extensor tendon function is well preserved. Capillary refill is 2 seconds with a brisk return. Axial lading of the joint causes crepitus and pain. Pinch strength is compromised. Slight subluxation of the 1st metacarpal base is noted. Neurovascular status is intact.      Diagnostics:  Bilateral wrist xrays ap/lateral views were ordered by Ryan Chow MD and performed at [facility]. These images were independently viewed and interpreted by myself, my impression as follows:    bilateral Wrist X-Ray  Indication: pain  AP, Lateral views  Findings: Advanced degenerative osteoarthritis of the CMC joint with bone-on-bone appearance.  no bony lesion  Soft tissues within normal limits  decreased joint spaces  Hardware appropriately positioned not applicable      no prior studies available for comparison.    X-RAY was ordered and reviewed by Ryan Chow MD  Assessment:  Eve was seen today for establish care, pain and pain.    Diagnoses and all orders  for this visit:    Arthritis of carpometacarpal (CMC) joint of right thumb  -     Small Joint Arthrocentesis: R thumb CMC  -     Small Joint Arthrocentesis: L thumb CMC    Primary osteoarthritis of first carpometacarpal joint of left hand  -     Small Joint Arthrocentesis: R thumb CMC  -     Small Joint Arthrocentesis: L thumb CMC          Small Joint Arthrocentesis: R thumb CMC  Consent given by: patient  Site marked: site marked  Timeout: Immediately prior to procedure a time out was called to verify the correct patient, procedure, equipment, support staff and site/side marked as required   Supporting Documentation  Indications: pain   Procedure Details  Location: thumb - R thumb CMC  Preparation: Patient was prepped and draped in the usual sterile fashion  Needle size: 27 G  Medications administered: 2 mL lidocaine 1 %; 160 mg methylPREDNISolone acetate 80 MG/ML  Patient tolerance: patient tolerated the procedure well with no immediate complications    Small Joint Arthrocentesis: L thumb CMC  Consent given by: patient  Site marked: site marked  Timeout: Immediately prior to procedure a time out was called to verify the correct patient, procedure, equipment, support staff and site/side marked as required   Supporting Documentation  Indications: pain   Procedure Details  Location: thumb - L thumb CMC  Preparation: Patient was prepped and draped in the usual sterile fashion  Needle size: 27 G  Medications administered: 2 mL lidocaine 1 %; 160 mg methylPREDNISolone acetate 80 MG/ML  Patient tolerance: patient tolerated the procedure well with no immediate complications        ?    Plan    · Compression/brace to the CMC joint of the thumb.  · Injected patient's right thumb CMC joint with a steroid from a dorsal approach.  · Injected patient's left thumb CMC joint with a steroid from a dorsal approach.  · Gentle active mobilization of the thumb to prevent stiffness.  · Partial trapezoid ectomy and interposition  arthroplasty discussed with the patient but she is not symptomatic enough to require surgical intervention at this point.  · Rest, ice, compression, and elevation (RICE) therapy  · Stretching and strengthening exercises of the flexors and extensors of the thumb.  · Alternate Ibuprofen and Tylenol as needed  · Follow up in 3 month(s)    Date of encounter: 09/10/2020   Ryan Chow MD

## 2020-12-10 ENCOUNTER — OFFICE VISIT (OUTPATIENT)
Dept: ORTHOPEDIC SURGERY | Facility: CLINIC | Age: 80
End: 2020-12-10

## 2020-12-10 VITALS — WEIGHT: 135 LBS | BODY MASS INDEX: 24.84 KG/M2 | HEIGHT: 62 IN | TEMPERATURE: 97.4 F

## 2020-12-10 DIAGNOSIS — M18.11 ARTHRITIS OF CARPOMETACARPAL (CMC) JOINT OF RIGHT THUMB: ICD-10-CM

## 2020-12-10 DIAGNOSIS — M18.12 PRIMARY OSTEOARTHRITIS OF FIRST CARPOMETACARPAL JOINT OF LEFT HAND: Primary | ICD-10-CM

## 2020-12-10 DIAGNOSIS — G56.02 CARPAL TUNNEL SYNDROME OF LEFT WRIST: ICD-10-CM

## 2020-12-10 PROCEDURE — 99213 OFFICE O/P EST LOW 20 MIN: CPT | Performed by: ORTHOPAEDIC SURGERY

## 2020-12-10 NOTE — PROGRESS NOTES
FOLLOW UP VISIT    Patient: Eve Fung  ?  YOB: 1940    MRN: [id number]  ?  Chief Complaint   Patient presents with   • Left Wrist - Follow-up   • Right Wrist - Follow-up      ?  HPI: She presents to the office with bilateral wrist and thumb pain.  She states that she is doing reasonably well at this point.  She does have difficulty with fine motor activities.  She has difficulty with  strength as well.  There is some numbness and tingling related to the thumb, middle and index fingers as well.  The patient states that she has to shake her hand especially in the middle of the night to restore the sensation to the thumb middle and index fingers.  She also has significant symptoms when she is driving her vehicle.  The patient states that she does understand that she needs a carpal tunnel release and will let me know when she is ready for that form of intervention based on symptom progression.    Pain Location: bilateral wrist  Radiation: none  Quality: aching, sharp, stabbing  Intensity/Severity: mild-moderate  Duration: several months  Onset quality: gradual   Timing: intermittent  Aggravating Factors: repetitive motion and rotating motion  Alleviating Factors: OTC analgesics  Previous Episodes: yes  Associated Symptoms: pain, swelling, clicking/popping  ADL Affected: grooming  Previous Treatment: Anti-inflammatory medication.    This patient is an established patient.  This problem is not new to this examiner.      Allergies:   Allergies   Allergen Reactions   • Pain & Fever [Acetaminophen] GI Intolerance     All pain medications hurts the patient stomach. Can only take tylenol - that sometimes upsets her stomach-       Medications:   Home Medications:  Current Outpatient Medications on File Prior to Visit   Medication Sig   • acetaminophen (TYLENOL) 325 MG tablet Take 650 mg by mouth every 6 (six) hours as needed for mild pain (1-3).   • clonazePAM (KlonoPIN) 0.5 MG tablet TK 1/2 T PO TID  "PRN   • DULoxetine (CYMBALTA) 30 MG capsule TK 1 C PO QD   • HYDROcodone-acetaminophen (NORCO) 5-325 MG per tablet Take one tablet by mouth every 8 to 12 hours prn pain   • meloxicam (MOBIC) 7.5 MG tablet TAKE 1 TABLET BY MOUTH AT NIGHT AS NEEDED FOR MODERATE PAIN   • omeprazole (priLOSEC) 40 MG capsule    • oxyCODONE-acetaminophen (PERCOCET) 5-325 MG per tablet Take 1 tablet by mouth Every 8 (Eight) Hours As Needed for Moderate Pain  or Severe Pain .     No current facility-administered medications on file prior to visit.      Current Medications:  Scheduled Meds:  PRN Meds:.    I have reviewed the patient's medical history in detail and updated the computerized patient record.  Review and summarization of old records include:    Past Medical History:   Diagnosis Date   • Osteoporosis      Past Surgical History:   Procedure Laterality Date   • APPENDECTOMY     • CHOLECYSTECTOMY     • HYSTERECTOMY       Social History     Occupational History   • Not on file   Tobacco Use   • Smoking status: Never Smoker   • Smokeless tobacco: Never Used   Substance and Sexual Activity   • Alcohol use: No   • Drug use: Not on file   • Sexual activity: Not on file      Social History     Social History Narrative   • Not on file     No family history on file.      Review of Systems  Constitutional: Negative for appetite change.   HENT: Negative.    Eyes: Negative.    Respiratory: Negative.    Cardiovascular: Negative.    Gastrointestinal: Negative.    Endocrine: Negative.    Genitourinary: Negative.    Musculoskeletal: See details of exam below.  Skin: Negative.    Allergic/Immunologic: Negative.    Hematological: Negative.    Psychiatric/Behavioral: Negative.         Wt Readings from Last 3 Encounters:   12/10/20 61.2 kg (135 lb)   09/10/20 60.3 kg (133 lb)   05/26/20 60.8 kg (134 lb)     Ht Readings from Last 3 Encounters:   12/10/20 157.5 cm (62\")   09/10/20 157.5 cm (62\")   05/26/20 157.5 cm (62\")     Body mass index is 24.69 " kg/m².  Facility age limit for growth percentiles is 20 years.  Vitals:    12/10/20 1452   Temp: 97.4 °F (36.3 °C)         Physical Exam  Constitutional: Patient is oriented to person, place, and time. Appears well-developed and well-nourished.   HENT:   Head: Normocephalic and atraumatic.   Eyes: Conjunctivae and EOM are normal. Pupils are equal, round, and reactive to light.   Cardiovascular: Normal rate, regular rhythm, normal heart sounds and intact distal pulses.   Pulmonary/Chest: Effort normal and breath sounds normal.   Musculoskeletal:   See detailed exam below   Neurological: Alert and oriented to person, place, and time. No sensory deficit. Coordination normal.   Skin: Skin is warm and dry. Capillary refill takes less than 2 seconds. No rash noted. No erythema.   Psychiatric: Patient has a normal mood and affect. Her behavior is normal. Judgment and thought content normal.   Nursing note and vitals reviewed.      Ortho Exam:   Bilateral wrist-carpal tunnel. The patient is right hand dominant. The Skin is mildly swollen volarly over the proximal crease of the wrist. Radial pulse is palpable. Capillary refill is 2 seconds with a brisk return. Positive Tinel's sign at the wrist. Positive Phalen's sign at the wrist .  strength is impaired.  There is no muscle wasting or atrophy specifically involving the thenar muscle group.  Sensation to light touch and pinprick are diminished over the thumb, index and middle fingers.  Flexor and extensor tendon functions are well preserved. Moving 2 point discrimination is prolonged to 12mm over the median nerve distribution. No evidence of RSD.  There is no clinical evidence of renal disease, thyroid disease or any generalized neurological condition that could be causing nerve dysfunction.      Diagnostics:  no diagnostic testing performed this visit       Assessment:  Diagnoses and all orders for this visit:    1. Primary osteoarthritis of first carpometacarpal joint  of left hand (Primary)    2. Arthritis of carpometacarpal (CMC) joint of right thumb    3. Carpal tunnel syndrome of left wrist          Procedures  ?    Plan    · Compression/brace  · Patient will eventually need a carpal tunnel release  · Rest, ice, compression, and elevation (RICE) therapy  · Stretching and strengthening exercises  · OTC Alternate Ibuprofen and Tylenol as needed  · Follow up in 1 year(s)  Risks of surgical procedure, possibility of infection, DVT, continued pain, limited strength, neurological deficit, scar tissue formation, recurrence of nerve compression  · and further surgical intervention discussed with the patient. Patient understands that surgery will benefit symptoms of pain. Recovery may take several months and may not be complete based on extent of preoperative nerve disease.    Date of encounter: 12/10/2020   Ryan Chow MD

## 2021-02-08 ENCOUNTER — OFFICE VISIT (OUTPATIENT)
Dept: ORTHOPEDIC SURGERY | Facility: CLINIC | Age: 81
End: 2021-02-08

## 2021-02-08 VITALS — TEMPERATURE: 95.3 F | BODY MASS INDEX: 26.35 KG/M2 | HEIGHT: 62 IN | WEIGHT: 143.2 LBS

## 2021-02-08 DIAGNOSIS — M18.11 PRIMARY OSTEOARTHRITIS OF FIRST CARPOMETACARPAL JOINT OF RIGHT HAND: Primary | Chronic | ICD-10-CM

## 2021-02-08 PROCEDURE — 20600 DRAIN/INJ JOINT/BURSA W/O US: CPT | Performed by: PHYSICIAN ASSISTANT

## 2021-02-08 RX ORDER — METHYLPREDNISOLONE ACETATE 80 MG/ML
40 INJECTION, SUSPENSION INTRA-ARTICULAR; INTRALESIONAL; INTRAMUSCULAR; SOFT TISSUE
Status: COMPLETED | OUTPATIENT
Start: 2021-02-08 | End: 2021-02-08

## 2021-02-08 RX ORDER — LIDOCAINE HYDROCHLORIDE 10 MG/ML
1 INJECTION, SOLUTION INFILTRATION; PERINEURAL
Status: COMPLETED | OUTPATIENT
Start: 2021-02-08 | End: 2021-02-08

## 2021-02-08 RX ADMIN — LIDOCAINE HYDROCHLORIDE 1 ML: 10 INJECTION, SOLUTION INFILTRATION; PERINEURAL at 09:06

## 2021-02-08 RX ADMIN — METHYLPREDNISOLONE ACETATE 40 MG: 80 INJECTION, SUSPENSION INTRA-ARTICULAR; INTRALESIONAL; INTRAMUSCULAR; SOFT TISSUE at 09:06

## 2021-02-08 NOTE — PROGRESS NOTES
Small Joint Arthrocentesis: R thumb CMC  Consent given by: patient  Site marked: site marked  Timeout: Immediately prior to procedure a time out was called to verify the correct patient, procedure, equipment, support staff and site/side marked as required   Supporting Documentation  Indications: pain   Procedure Details  Location: thumb - R thumb CMC  Preparation: Patient was prepped and draped in the usual sterile fashion  Needle size: 27 G  Approach: dorsal  Medications administered: 40 mg methylPREDNISolone acetate 80 MG/ML; 1 mL lidocaine 1 %  Patient tolerance: patient tolerated the procedure well with no immediate complications      Electronically signed by Shay Tim PA-C, 02/08/21, 9:05 AM EST.

## 2021-02-08 NOTE — PROGRESS NOTES
"Chief Complaint  Follow-up and Pain of the Right Wrist and Follow-up and Pain of the Left Wrist    Subjective    History of Present Illness      Eve Fung is a 80 y.o. female who presents to [facility] for is here today for injection therapy. She receives BILATERAL thumb-CMC joint injections of steroid, although today she only needs the right thumb injected. Since last injection, patient notes substantial relief of symptoms. Treatment options have been discussed and she understands and consents.       Objective   Vital Signs:   Temp 95.3 °F (35.2 °C)   Ht 157.5 cm (62\")   Wt 65 kg (143 lb 3.2 oz)   BMI 26.19 kg/m²     Physical Exam  80 y.o. female is awake, alert, oriented, in no acute distress and well developed, well nourished.  Ortho Exam   RIGHT 1st finger  Positive for tenderness at base of the 1st metacarpal   Positive for swelling of skin and soft tissues   Flexor and extensor tendon function is well preserved. Capillary refill is 2 seconds with a brisk return. Neurovascular status is intact.  Positive for crepitus and pain with axial loading of the joint   Pinch strength is compromised  Slight subluxation of the 1st metacarpal base is noted.         Procedures   See separate procedure note  Injection site was identified by physical examination and cleaned with Betadine and alcohol swabs. Prior to needle insertion, ethyl chloride spray was used for surface anesthesia. Sterile technique was used.       Assessment   Assessment and Plan    Problem List Items Addressed This Visit        Other    Osteoarthritis of carpometacarpal (CMC) joint of left thumb - Primary          Follow Up   • Right CMC-thumb steroid injection was discussed with the patient. Discussed indication, risks, benefits, and alternatives. Verbal consent was given to proceed with the procedure.   • Injection was performed from dorsal approach.  Patient tolerated the procedure well " and no complications were encountered.  • Discussion of orthopedic goals and activities and patient/guardian expressed understanding.  • Ice, heat, rest, compression and elevation of extremity as beneficial  • nsaids and/or tylenol as beneficial  • Instructed to refrain from heavy activity/rest the extremity for the next 24-48 hours  • Discussion regarding possibility of cortisol flare and what to expect if this occurs  • Watch for signs and symptoms of infection  • Call if adverse effect from injection therapy  • Follow up in 3 months  • Patient was given instructions and counseling regarding her condition or for health maintenance advice. Please see specific information pulled into the AVS if appropriate.     Shay Tim PA-C   Date of Encounter: 2/8/2021   Electronically signed by Shay Tim PA-C, 02/08/21, 8:50 AM EST.     EMR Dragon/Transcription disclaimer:  Much of this encounter note is an electronic transcription/translation of spoken language to printed text. The electronic translation of spoken language may permit erroneous, or at times, nonsensical words or phrases to be inadvertently transcribed; Although I have reviewed the note for such errors, some may still exist.

## 2021-06-24 ENCOUNTER — CLINICAL SUPPORT (OUTPATIENT)
Dept: ORTHOPEDIC SURGERY | Facility: CLINIC | Age: 81
End: 2021-06-24

## 2021-06-24 VITALS — HEIGHT: 62 IN | TEMPERATURE: 98.4 F | WEIGHT: 143 LBS | BODY MASS INDEX: 26.31 KG/M2

## 2021-06-24 DIAGNOSIS — M18.12 PRIMARY OSTEOARTHRITIS OF FIRST CARPOMETACARPAL JOINT OF LEFT HAND: Primary | ICD-10-CM

## 2021-06-24 DIAGNOSIS — M70.61 GREATER TROCHANTERIC BURSITIS OF RIGHT HIP: ICD-10-CM

## 2021-06-24 PROCEDURE — 20605 DRAIN/INJ JOINT/BURSA W/O US: CPT | Performed by: ORTHOPAEDIC SURGERY

## 2021-06-24 PROCEDURE — 20610 DRAIN/INJ JOINT/BURSA W/O US: CPT | Performed by: ORTHOPAEDIC SURGERY

## 2021-06-24 RX ORDER — LIDOCAINE HYDROCHLORIDE 10 MG/ML
2 INJECTION, SOLUTION INFILTRATION; PERINEURAL
Status: COMPLETED | OUTPATIENT
Start: 2021-06-24 | End: 2021-06-24

## 2021-06-24 RX ORDER — METHYLPREDNISOLONE ACETATE 80 MG/ML
160 INJECTION, SUSPENSION INTRA-ARTICULAR; INTRALESIONAL; INTRAMUSCULAR; SOFT TISSUE
Status: COMPLETED | OUTPATIENT
Start: 2021-06-24 | End: 2021-06-24

## 2021-06-24 RX ADMIN — LIDOCAINE HYDROCHLORIDE 2 ML: 10 INJECTION, SOLUTION INFILTRATION; PERINEURAL at 14:49

## 2021-06-24 RX ADMIN — METHYLPREDNISOLONE ACETATE 160 MG: 80 INJECTION, SUSPENSION INTRA-ARTICULAR; INTRALESIONAL; INTRAMUSCULAR; SOFT TISSUE at 14:49

## 2021-06-24 RX ADMIN — METHYLPREDNISOLONE ACETATE 160 MG: 80 INJECTION, SUSPENSION INTRA-ARTICULAR; INTRALESIONAL; INTRAMUSCULAR; SOFT TISSUE at 14:31

## 2021-06-24 NOTE — PROGRESS NOTES
INJECTION    Patient: Eve Fung    YOB: 1940    MRN: [id number]    Chief Complaint   Patient presents with   • Right Wrist - Follow-up, Pain   • Left Wrist - Follow-up, Pain   • Right Hip - Follow-up, Pain       History of Present Illness: Patient returns for follow-up on hip pain. The pain is over the lateral aspect of the right hip at the greater trochanteric bursa.  Her pain has been progressive in nature and remains intermittent .   Her pain is worsened  going up and down stairs, rising after sitting, walking, standing. There has been improvement in the past with ice and NSAIDS.  She has also been complaining of pain in swelling over the wrist joint.  This pain is over the radial aspect of the wrist joint.    This problem is not new to this examiner.     Allergies:   Allergies   Allergen Reactions   • Pain & Fever [Acetaminophen] GI Intolerance     All pain medications hurts the patient stomach. Can only take tylenol - that sometimes upsets her stomach-       Medications:   Home Medications:  Current Outpatient Medications on File Prior to Visit   Medication Sig   • acetaminophen (TYLENOL) 325 MG tablet Take 650 mg by mouth every 6 (six) hours as needed for mild pain (1-3).   • clonazePAM (KlonoPIN) 0.5 MG tablet TK 1/2 T PO TID PRN   • dicyclomine (BENTYL) 10 MG capsule TAKE 1 CAPSULE BY MOUTH BEFORE MEAL(S) AND AT BEDTIME AS NEEDED   • DULoxetine (CYMBALTA) 30 MG capsule TK 1 C PO QD   • meloxicam (MOBIC) 7.5 MG tablet TAKE 1 TABLET BY MOUTH AT NIGHT AS NEEDED FOR MODERATE PAIN   • omeprazole (priLOSEC) 40 MG capsule    • HYDROcodone-acetaminophen (NORCO) 5-325 MG per tablet Take one tablet by mouth every 8 to 12 hours prn pain   • oxyCODONE-acetaminophen (PERCOCET) 5-325 MG per tablet Take 1 tablet by mouth Every 8 (Eight) Hours As Needed for Moderate Pain  or Severe Pain .     No current facility-administered medications on file prior to visit.     Current Medications:  Scheduled  "Meds:  PRN Meds:.    I have reviewed the patient's medical history in detail and updated the computerized patient record.  Review and summarization of old records include:    Past Medical History:   Diagnosis Date   • Osteoporosis      Past Surgical History:   Procedure Laterality Date   • APPENDECTOMY     • CHOLECYSTECTOMY     • HYSTERECTOMY       Social History     Occupational History   • Not on file   Tobacco Use   • Smoking status: Never Smoker   • Smokeless tobacco: Never Used   Substance and Sexual Activity   • Alcohol use: No   • Drug use: Not on file   • Sexual activity: Not on file      Social History     Social History Narrative   • Not on file     History reviewed. No pertinent family history.    Review of Systems        Wt Readings from Last 3 Encounters:   06/24/21 64.9 kg (143 lb)   02/08/21 65 kg (143 lb 3.2 oz)   12/10/20 61.2 kg (135 lb)     Ht Readings from Last 3 Encounters:   06/24/21 157.5 cm (62.01\")   02/08/21 157.5 cm (62\")   12/10/20 157.5 cm (62\")     Body mass index is 26.15 kg/m².  Facility age limit for growth percentiles is 20 years.  Vitals:    06/24/21 1411   Temp: 98.4 °F (36.9 °C)       Physical Exam    Musculoskeletal:    Right hip (gtb): Skin and soft tissues over the greater trochanteric bursa are tender upon palpation, along with IT band tenderness. Cross body adduction is negative. Internal and external rotations are bothersome and cause tenderness over the greater trochanter. There is no clinical deformity and no shortening. She does not have a limp upon walking. There is not piriformis tightness and there  is not capsular tightness with any rotation. Dorsalis pedis and posterior tibial artery pulses are palpable. Neurovascular status is intact and capillary refill is less than 2 seconds. Common peroneal nerve function is well preserved.    right wrist. The patient is right  hand dominant. There is a significant amount of swelling and tenderness along the 1st dorsal " compartment tendons. Abduction of the thumb bothers the patient significantly. There is pain and tenderness over the radial styloid process. Skin and soft tissues are somewhat swollen and mildly bruised. Finkelstein sign is positive. Ulnar deviation of the wrist bothers the patient significantly. Patients  strength is somewhat compromised because of the pain along the base of the thumb and over the radial styloid process. Patient is unable to make a fist with good  strength when fist is clenched. Capillary refill is 2 seconds with a brisk return. Radial artery pulses are palpable. Median nerve function is well preserved. There is some amount of inflammation over the dorsal extensor tendon sheaths over the remaining radial carpal compartments dorsally.  Diagnostics:      Procedure:  Large Joint Arthrocentesis: R greater trochanteric bursa  Date/Time: 6/24/2021 2:31 PM  Consent given by: patient  Site marked: site marked  Timeout: Immediately prior to procedure a time out was called to verify the correct patient, procedure, equipment, support staff and site/side marked as required   Supporting Documentation  Indications: pain   Procedure Details  Location: hip - R greater trochanteric bursa  Preparation: Patient was prepped and draped in the usual sterile fashion  Needle size: 25 G  Approach: anteromedial  Medications administered: 160 mg methylPREDNISolone acetate 80 MG/ML; 2 mL lidocaine (cardiac)  Patient tolerance: patient tolerated the procedure well with no immediate complications    Medium Joint Arthrocentesis: R radiocarpal  Date/Time: 6/24/2021 2:49 PM  Consent given by: patient  Site marked: site marked  Timeout: Immediately prior to procedure a time out was called to verify the correct patient, procedure, equipment, support staff and site/side marked as required   Supporting Documentation  Indications: pain   Procedure Details  Location: wrist - R radiocarpal  Preparation: Patient was prepped and  draped in the usual sterile fashion  Needle size: 26 G  Medications administered: 160 mg methylPREDNISolone acetate 80 MG/ML; 2 mL lidocaine 1 %  Patient tolerance: patient tolerated the procedure well with no immediate complications            Assessment:   Diagnoses and all orders for this visit:    1. Primary osteoarthritis of first carpometacarpal joint of left hand (Primary)  -     Large Joint Arthrocentesis: R greater trochanteric bursa  -     Medium Joint Arthrocentesis: R radiocarpal  -     methylPREDNISolone acetate (DEPO-medrol) injection 160 mg  -     lidocaine (cardiac) (XYLOCAINE) injection 2 mL  -     methylPREDNISolone acetate (DEPO-medrol) injection 160 mg  -     lidocaine (XYLOCAINE) 1 % injection 2 mL    2. Greater trochanteric bursitis of right hip          Plan:   · Injected patient's right hip-greater trochanteric bursa and wrist joint(s)with Depo-Medrol from an lateral approach   · Compression/brace to the wrist.  · Rest, ice, compression, and elevation (RICE) therapy  · OTC Alternate Ibuprofen and Tylenol as needed  · Follow up in 3 month(s)    Date of encounter: 06/24/2021   Ryan Chow MD

## 2021-06-30 PROBLEM — M70.61 GREATER TROCHANTERIC BURSITIS OF RIGHT HIP: Status: ACTIVE | Noted: 2021-06-30

## 2021-09-30 ENCOUNTER — CLINICAL SUPPORT (OUTPATIENT)
Dept: ORTHOPEDIC SURGERY | Facility: CLINIC | Age: 81
End: 2021-09-30

## 2021-09-30 VITALS — TEMPERATURE: 96.8 F | BODY MASS INDEX: 26.31 KG/M2 | HEIGHT: 62 IN | WEIGHT: 143 LBS

## 2021-09-30 DIAGNOSIS — M70.61 GREATER TROCHANTERIC BURSITIS OF RIGHT HIP: Primary | ICD-10-CM

## 2021-09-30 DIAGNOSIS — M18.11 ARTHRITIS OF CARPOMETACARPAL (CMC) JOINT OF RIGHT THUMB: ICD-10-CM

## 2021-09-30 PROCEDURE — 20610 DRAIN/INJ JOINT/BURSA W/O US: CPT | Performed by: ORTHOPAEDIC SURGERY

## 2021-09-30 RX ADMIN — METHYLPREDNISOLONE ACETATE 160 MG: 80 INJECTION, SUSPENSION INTRA-ARTICULAR; INTRALESIONAL; INTRAMUSCULAR; SOFT TISSUE at 12:48

## 2021-10-28 RX ORDER — METHYLPREDNISOLONE ACETATE 80 MG/ML
160 INJECTION, SUSPENSION INTRA-ARTICULAR; INTRALESIONAL; INTRAMUSCULAR; SOFT TISSUE
Status: COMPLETED | OUTPATIENT
Start: 2021-09-30 | End: 2021-09-30

## 2021-12-09 ENCOUNTER — OFFICE VISIT (OUTPATIENT)
Dept: ORTHOPEDIC SURGERY | Facility: CLINIC | Age: 81
End: 2021-12-09

## 2021-12-09 VITALS — HEIGHT: 62 IN | TEMPERATURE: 97.6 F | BODY MASS INDEX: 26.31 KG/M2 | WEIGHT: 143 LBS

## 2021-12-09 DIAGNOSIS — M18.11 PRIMARY OSTEOARTHRITIS OF FIRST CARPOMETACARPAL JOINT OF RIGHT HAND: ICD-10-CM

## 2021-12-09 DIAGNOSIS — M18.11 ARTHRITIS OF CARPOMETACARPAL (CMC) JOINT OF RIGHT THUMB: ICD-10-CM

## 2021-12-09 DIAGNOSIS — M25.531 BILATERAL WRIST PAIN: Primary | ICD-10-CM

## 2021-12-09 DIAGNOSIS — M25.532 BILATERAL WRIST PAIN: Primary | ICD-10-CM

## 2021-12-09 PROCEDURE — 20600 DRAIN/INJ JOINT/BURSA W/O US: CPT | Performed by: ORTHOPAEDIC SURGERY

## 2021-12-09 PROCEDURE — 99213 OFFICE O/P EST LOW 20 MIN: CPT | Performed by: ORTHOPAEDIC SURGERY

## 2021-12-09 RX ORDER — PHENAZOPYRIDINE HYDROCHLORIDE 200 MG/1
200 TABLET, FILM COATED ORAL 2 TIMES DAILY
COMMUNITY
Start: 2021-11-24

## 2021-12-09 RX ADMIN — TRIAMCINOLONE ACETONIDE 80 MG: 40 INJECTION, SUSPENSION INTRA-ARTICULAR; INTRAMUSCULAR at 13:25

## 2021-12-09 RX ADMIN — LIDOCAINE HYDROCHLORIDE 2 ML: 10 INJECTION, SOLUTION INFILTRATION; PERINEURAL at 13:25

## 2021-12-09 NOTE — PROGRESS NOTES
"Chief Complaint  Follow-up and Pain of the Right Wrist and Follow-up and Pain of the Left Wrist    Subjective    History of Present Illness      Eve Fung is a 81 y.o. female who presents to [facility] for bilateral hand and thumb pain.  History of Present Illness the patient is having increasing difficulty in utilization of the upper extremities.  She has CMC joint pain and discomfort.   strength over the thumbs is difficult for the patient.  She has difficulty in grasping heavy objects.  She has numbness and tingling in the thumb middle and index fingers.  She states that fine motor activities bother her quite a bit.  She has to shake her hand all the time to restore the sensation to the hand.  Symptoms are worse when she is driving and at night.  Pain Location:  BILATERAL wrist and BILATERAL thumb-CMC joint  Radiation: none  Quality: dull, aching  Intensity/Severity: mild-moderate  Duration: several years  Progression of symptoms: yes, progressive worsening  Onset quality: gradual   Timing: intermittent  Aggravating Factors: rotating motion, repetitive motion, lifting objects  Alleviating Factors: NSAIDs, rest, brace  Previous Episodes: yes  Associated Symptoms: pain, decreased strength  ADLs Affected: grooming/hygiene/toileting/personal care, dressing, recreational activities/sports, meal preparation  Previous Treatment: NSAIDs and intra-articular injection       Objective   Vital Signs:   Temp 97.6 °F (36.4 °C)   Ht 157.5 cm (62.01\")   Wt 64.9 kg (143 lb)   BMI 26.15 kg/m²     Physical Exam  Physical Exam  Vitals signs and nursing note reviewed.   Constitutional:       Appearance: Normal appearance.   Pulmonary:      Effort: Pulmonary effort is normal.   Skin:     General: Skin is warm and dry.      Capillary Refill: Capillary refill takes less than 2 seconds.   Neurological:      General: No focal deficit present.      Mental Status: He is alert and oriented to " person, place, and time. Mental status is at baseline.   Psychiatric:         Mood and Affect: Mood normal.         Behavior: Behavior normal.         Thought Content: Thought content normal.         Judgment: Judgment normal.     Ortho Exam   Bilateral hand-CMC joint. The patient is right hand dominant. Tenderness is present at base of the 1st metacarpal. Skin and soft tissues are mildly swollen. Flexor and extensor tendon function is well preserved. Capillary refill is 2 seconds with a brisk return. Axial lading of the joint causes crepitus and pain. Pinch strength is compromised. Slight subluxation of the 1st metacarpal base is noted. Neurovascular status is intact.  Bilateral wrist-carpal tunnel. The patient is right hand dominant. The Skin is mildly swollen volarly over the proximal crease of the wrist. Radial pulse is palpable. Capillary refill is 2 seconds with a brisk return. Positive Tinel's sign at the wrist. Positive Phalen's sign at the wrist .  strength is impaired.  There is no muscle wasting or atrophy specifically involving the thenar muscle group.  Sensation to light touch and pinprick are diminished over the thumb, index and middle fingers.  Flexor and extensor tendon functions are well preserved. Moving 2 point discrimination is prolonged to 12mm over the median nerve distribution. No evidence of RSD.  There is no clinical evidence of renal disease, thyroid disease or any generalized neurological condition that could be causing nerve dysfunction.      Result Review :   The following data was reviewed by: Ryan Chow MD on 12/09/2021:  Radiologic studies - see below for interpretation  no diagnostic testing performed this visit            Small Joint Arthrocentesis: R thumb CMC  Consent given by: patient  Site marked: site marked  Timeout: Immediately prior to procedure a time out was called to verify the correct patient, procedure, equipment, support staff and site/side marked as required    Supporting Documentation  Indications: pain   Procedure Details  Location: thumb - R thumb CMC  Preparation: Patient was prepped and draped in the usual sterile fashion  Needle size: 27 G  Medications administered: 2 mL lidocaine 1 %; 80 mg triamcinolone acetonide 40 MG/ML  Patient tolerance: patient tolerated the procedure well with no immediate complications    Small Joint Arthrocentesis: L thumb CMC  Consent given by: patient  Site marked: site marked  Timeout: Immediately prior to procedure a time out was called to verify the correct patient, procedure, equipment, support staff and site/side marked as required   Supporting Documentation  Indications: pain   Procedure Details  Location: thumb - L thumb CMC  Preparation: Patient was prepped and draped in the usual sterile fashion  Needle size: 27 G  Medications administered: 2 mL lidocaine 1 %; 80 mg triamcinolone acetonide 40 MG/ML  Patient tolerance: patient tolerated the procedure well with no immediate complications                 Assessment   Assessment and Plan    Diagnoses and all orders for this visit:    1. Bilateral wrist pain (Primary)    2. Arthritis of carpometacarpal (CMC) joint of right thumb    3. Primary osteoarthritis of first carpometacarpal joint of right hand    Other orders  -     Small Joint Arthrocentesis: R thumb CMC  -     Small Joint Arthrocentesis: L thumb CMC          Follow Up   · Compression/brace to the hands to normalize the pressure over the transverse carpal ligament.  · Injected the patient's right thumb CMC joint with a steroid from a dorsal approach.  · Injected patient's left thumb over the CMC joint with a dorsal approach with a steroid.  · Calcium and vitamin D for bone health.  · Vitamin B6, B12 100 mcg tab 1 p.o. daily for nerve function improvement.  · Surgical intervention in the form of carpal tunnel release and CMC joint arthroplasty discussed and offered to the patient.  · Rest, ice, compression, and elevation  (RICE) therapy  · Stretching and strengthening exercises of the flexors and extensors of the thumb and the fingers.  · OTC Ibuprofen 600mg by mouth every 6-8 hours as needed for pain and swelling  · Follow up in 3 month(s)  • Patient was given instructions and counseling regarding her condition or for health maintenance advice. Please see specific information pulled into the AVS if appropriate.     Ryan Chow MD   Date of Encounter: 12/9/2021     EMR Dragon/Transcription disclaimer:  Much of this encounter note is an electronic transcription/translation of spoken language to printed text. The electronic translation of spoken language may permit erroneous, or at times, nonsensical words or phrases to be inadvertently transcribed; Although I have reviewed the note for such errors, some may still exist.

## 2021-12-10 RX ORDER — TRIAMCINOLONE ACETONIDE 40 MG/ML
80 INJECTION, SUSPENSION INTRA-ARTICULAR; INTRAMUSCULAR
Status: COMPLETED | OUTPATIENT
Start: 2021-12-09 | End: 2021-12-09

## 2021-12-10 RX ORDER — LIDOCAINE HYDROCHLORIDE 10 MG/ML
2 INJECTION, SOLUTION INFILTRATION; PERINEURAL
Status: COMPLETED | OUTPATIENT
Start: 2021-12-09 | End: 2021-12-09

## 2022-03-10 ENCOUNTER — OFFICE VISIT (OUTPATIENT)
Dept: ORTHOPEDIC SURGERY | Facility: CLINIC | Age: 82
End: 2022-03-10

## 2022-03-10 VITALS — BODY MASS INDEX: 26.31 KG/M2 | TEMPERATURE: 98.7 F | WEIGHT: 143 LBS | HEIGHT: 62 IN

## 2022-03-10 DIAGNOSIS — M18.12 PRIMARY OSTEOARTHRITIS OF FIRST CARPOMETACARPAL JOINT OF LEFT HAND: Primary | ICD-10-CM

## 2022-03-10 DIAGNOSIS — Z96.651 S/P TKR (TOTAL KNEE REPLACEMENT), RIGHT: ICD-10-CM

## 2022-03-10 DIAGNOSIS — M18.11 ARTHRITIS OF CARPOMETACARPAL (CMC) JOINT OF RIGHT THUMB: ICD-10-CM

## 2022-03-10 PROCEDURE — 99213 OFFICE O/P EST LOW 20 MIN: CPT | Performed by: ORTHOPAEDIC SURGERY

## 2022-03-10 NOTE — PROGRESS NOTES
"Chief Complaint  Follow-up and Pain of the Right Hand and Follow-up and Pain of the Left Hand    Subjective    History of Present Illness      Eve Fung is a 81 y.o. female who presents to [facility] for follow-up on bilateral thumb joint CMC arthritis and right total knee arthroplasty  History of Present Illness the patient has done quite well following right total knee replacement performed by me 4 years ago.  She states that the knee replacement improved her quality of life significantly for the better.  She does not take any pain medication for her knee at this point.  She states that she is able to walk for exercise without any significant difficulties.  Her quality of life improved significantly for the better after the knee replacement surgery.  She does have pain and discomfort on the base of both thumbs left and right.  The pain is worse with heavy  strength.  She does have tenderness laterally over the CMC joint.  There is crepitus throughout the range of motion.  Pain Location:  RIGHT knee and BILATERAL thumb-CMC joint  Radiation: none  Quality: dull  Intensity/Severity: none  Duration: several years  Progression of symptoms: no worsening, reports improvement  Onset quality: gradual   Timing: intermittent  Aggravating Factors: kneeling, rotating motion  Alleviating Factors: NSAIDs, steroid injection (intra-articular), rest  Previous Episodes: yes  Associated Symptoms: pain, swelling, clicking/popping  ADLs Affected: ambulating, recreational activities/sports  Previous Treatment: NSAIDs, intra-articular injection and prior surgery       Objective   Vital Signs:   Temp 98.7 °F (37.1 °C)   Ht 157.5 cm (62.01\")   Wt 64.9 kg (143 lb)   BMI 26.15 kg/m²     Physical Exam  Physical Exam  Vitals signs and nursing note reviewed.   Constitutional:       Appearance: Normal appearance.   Pulmonary:      Effort: Pulmonary effort is normal.   Skin:     General: Skin is warm " and dry.      Capillary Refill: Capillary refill takes less than 2 seconds.   Neurological:      General: No focal deficit present.      Mental Status: He is alert and oriented to person, place, and time. Mental status is at baseline.   Psychiatric:         Mood and Affect: Mood normal.         Behavior: Behavior normal.         Thought Content: Thought content normal.         Judgment: Judgment normal.     Ortho Exam   Bilateral hand-CMC joint. The patient is right hand dominant. Tenderness is present at base of the 1st metacarpal. Skin and soft tissues are mildly swollen. Flexor and extensor tendon function is well preserved. Capillary refill is 2 seconds with a brisk return. Axial lading of the joint causes crepitus and pain. Pinch strength is compromised. Slight subluxation of the 1st metacarpal base is noted. Neurovascular status is intact.    Right knee.The patient is status post total knee arthroplasty postoperative 4 year(s). Incision is clean. Calf is soft and nontender. Homans sign is negative. There is no clicking, popping or catching. Anterior and posterior drawer signs are negative.  There is no instability of the components. Appropriate amounts of swelling and bruising are noted. Dorsalis pedis and posterior tibial artery pulses are palpable. Common peroneal nerve function is well preserved. Range of motion is from 0-125 degrees of flexion. Gait is cautious but otherwise fairly normal. There is no evidence of a deep seated joint infection.          Result Review :   The following data was reviewed by: Ryan Chow MD on 03/10/2022:  Radiologic studies - see below for interpretation  xrays to be obtained at next visit            Procedures           Assessment   Assessment and Plan    Diagnoses and all orders for this visit:    1. Primary osteoarthritis of first carpometacarpal joint of left hand (Primary)    2. Arthritis of carpometacarpal (CMC) joint of right thumb    3. S/P TKR (total knee  replacement), right          Follow Up   · Compression/brace to the hand and thumb to protect the CMC joint from undue pressure.  · Calcium and vitamin D for bone pain  · Tablet meloxicam 7.5 mg tab 1 p.o. nightly for pain swelling and discomfort.  · , GI and dental procedure prophylaxis with antibiotics to prevent metastatic infection of the knee arthroplasty implants.  · Falls precautions discussed with patient.  · Steroid injection to the base of the CMC joint discussed with the patient at length.  · Rest, ice, compression, and elevation (RICE) therapy  · Stretching and strengthening exercises of the quads and the hamstrings.  · OTC Ibuprofen 600mg by mouth every 6-8 hours as needed for pain and swelling  · Follow up in 1 year(s)  • Patient was given instructions and counseling regarding her condition or for health maintenance advice. Please see specific information pulled into the AVS if appropriate.     Ryan Chow MD   Date of Encounter: 3/10/2022        EMR Dragon/Transcription disclaimer:  Much of this encounter note is an electronic transcription/translation of spoken language to printed text. The electronic translation of spoken language may permit erroneous, or at times, nonsensical words or phrases to be inadvertently transcribed; Although I have reviewed the note for such errors, some may still exist.

## 2022-08-01 ENCOUNTER — TELEPHONE (OUTPATIENT)
Dept: ORTHOPEDIC SURGERY | Facility: CLINIC | Age: 82
End: 2022-08-01

## 2022-08-01 NOTE — TELEPHONE ENCOUNTER
Caller: RAISSA OSPINA    Relationship to patient: SELF    Best call back number: [phone]    Chief complaint: BILATERAL WRIST PAIN    Type of visit: CORTISONE    Requested date: ASAP    If rescheduling, when is the original appointment: N/A    Additional notes: LAST SEEN MARCH 2022

## 2022-08-16 ENCOUNTER — CLINICAL SUPPORT (OUTPATIENT)
Dept: ORTHOPEDIC SURGERY | Facility: CLINIC | Age: 82
End: 2022-08-16

## 2022-08-16 VITALS — TEMPERATURE: 97.3 F | BODY MASS INDEX: 25.4 KG/M2 | HEIGHT: 62 IN | WEIGHT: 138 LBS

## 2022-08-16 DIAGNOSIS — M18.12 PRIMARY OSTEOARTHRITIS OF FIRST CARPOMETACARPAL JOINT OF LEFT HAND: Primary | ICD-10-CM

## 2022-08-16 DIAGNOSIS — M70.62 GREATER TROCHANTERIC BURSITIS OF LEFT HIP: ICD-10-CM

## 2022-08-16 PROCEDURE — 20610 DRAIN/INJ JOINT/BURSA W/O US: CPT | Performed by: PHYSICIAN ASSISTANT

## 2022-08-16 PROCEDURE — 20600 DRAIN/INJ JOINT/BURSA W/O US: CPT | Performed by: PHYSICIAN ASSISTANT

## 2022-08-16 RX ADMIN — METHYLPREDNISOLONE ACETATE 40 MG: 80 INJECTION, SUSPENSION INTRA-ARTICULAR; INTRALESIONAL; INTRAMUSCULAR; SOFT TISSUE at 16:31

## 2022-08-16 RX ADMIN — METHYLPREDNISOLONE ACETATE 160 MG: 80 INJECTION, SUSPENSION INTRA-ARTICULAR; INTRALESIONAL; INTRAMUSCULAR; SOFT TISSUE at 15:52

## 2022-08-16 RX ADMIN — LIDOCAINE HYDROCHLORIDE 1 ML: 20 INJECTION, SOLUTION INTRAVENOUS at 16:31

## 2022-08-16 NOTE — PROGRESS NOTES
"Chief Complaint  Follow-up and Pain of the Right Hand and Follow-up and Pain of the Left Hip    Subjective    History of Present Illness      Eve Fung is a 81 y.o. female who presents to [facility] for is here today for injection therapy. She receives BILATERAL thumb-CMC joint injections of steroid. Today she only needs the right thumb injected. She also requests an injection in the left hip-GTB.  Since last injection, patient notes substantial relief of symptoms. Treatment options have been discussed and she understands and consents.       Objective   Vital Signs:   Temp 97.3 °F (36.3 °C)   Ht 157.5 cm (62\")   Wt 62.6 kg (138 lb)   BMI 25.24 kg/m²     Physical Exam  81 y.o. female is awake, alert, oriented, in no acute distress and well developed, well nourished.  Ortho Exam   RIGHT 1st finger  Positive for tenderness at base of the 1st metacarpal   Positive for swelling of skin and soft tissues   Flexor and extensor tendon function is well preserved. Capillary refill is 2 seconds with a brisk return. Neurovascular status is intact.  Positive for crepitus and pain with axial loading of the joint   Pinch strength is compromised  Slight subluxation of the 1st metacarpal base is noted.       LEFT hip  There is tenderness with palpation over the greater trochanteric bursa.   There is tenderness with palpation of the IT band.  Cross body adduction is positive.   Positive for pain over the greater trochanter with internal and external rotation.   There is no clinical deformity and no shortening of extremity.   There is piriformis tightness.   Dorsalis pedis and posterior tibial artery pulses are palpable.   Neurovascular status is intact and capillary refill is less than 2 seconds.   Common peroneal nerve function is well preserved.       Procedures   See separate procedure note  Injection site was identified by physical examination and cleaned with Betadine and alcohol swabs. " Prior to needle insertion, ethyl chloride spray was used for surface anesthesia. Sterile technique was used.       Assessment   Assessment and Plan    Problem List Items Addressed This Visit        Musculoskeletal and Injuries    Osteoarthritis of carpometacarpal (CMC) joint of left thumb - Primary    Relevant Orders    Small Joint Arthrocentesis: L thumb CMC      Other Visit Diagnoses     Greater trochanteric bursitis of left hip        Relevant Orders    Large Joint Arthrocentesis: L greater trochanteric bursa          Follow Up   • Right CMC-thumb and left hip-GTB steroid injection was discussed with the patient. Discussed indication, risks, benefits, and alternatives. Verbal consent was given to proceed with the procedure.   • Injection was performed from dorsal and lateral approach, respectively.  Patient tolerated the procedure well and no complications were encountered.  • Discussion of orthopedic goals and activities and patient/guardian expressed understanding.  • Ice, heat, rest, compression and elevation of extremity as beneficial  • nsaids and/or tylenol as beneficial  • Instructed to refrain from heavy activity/rest the extremity for the next 24-48 hours  • Discussion regarding possibility of cortisol flare and what to expect if this occurs  • Watch for signs and symptoms of infection  • Call if adverse effect from injection therapy  • Follow up in 3 months  • Patient was given instructions and counseling regarding her condition or for health maintenance advice. Please see specific information pulled into the AVS if appropriate.     Shay Tim PA-C   Date of Encounter: 8/16/2022   Electronically signed by Shay Tim PA-C, 08/17/22, 6:34 AM EDT.     EMR Dragon/Transcription disclaimer:  Much of this encounter note is an electronic transcription/translation of spoken language to printed text. The electronic translation of spoken language may permit erroneous, or at times, nonsensical  words or phrases to be inadvertently transcribed; Although I have reviewed the note for such errors, some may still exist.

## 2022-08-16 NOTE — PROGRESS NOTES
Small Joint Arthrocentesis: L thumb CMC  Consent given by: patient  Site marked: site marked  Timeout: Immediately prior to procedure a time out was called to verify the correct patient, procedure, equipment, support staff and site/side marked as required   Supporting Documentation  Indications: pain   Procedure Details  Location: thumb - L thumb CMC  Preparation: Patient was prepped and draped in the usual sterile fashion  Needle size: 27 G  Approach: dorsal  Medications administered: 1 mL Lidocaine HCl (Cardiac)  MG/5ML; 40 mg methylPREDNISolone acetate 80 MG/ML (0.5cc lidocaine used)  Patient tolerance: patient tolerated the procedure well with no immediate complications    Large Joint Arthrocentesis: L greater trochanteric bursa  Date/Time: 8/16/2022 3:52 PM  Consent given by: patient  Site marked: site marked  Timeout: Immediately prior to procedure a time out was called to verify the correct patient, procedure, equipment, support staff and site/side marked as required   Supporting Documentation  Indications: pain   Procedure Details  Location: hip - L greater trochanteric bursa  Preparation: Patient was prepped and draped in the usual sterile fashion  Needle size: 22 G  Approach: lateral  Medications administered: 2 mL lidocaine (cardiac); 160 mg methylPREDNISolone acetate 80 MG/ML  Patient tolerance: patient tolerated the procedure well with no immediate complications      Electronically signed by Shay Tim PA-C, 08/16/22, 4:30 PM EDT.

## 2022-08-17 RX ORDER — METHYLPREDNISOLONE ACETATE 80 MG/ML
40 INJECTION, SUSPENSION INTRA-ARTICULAR; INTRALESIONAL; INTRAMUSCULAR; SOFT TISSUE
Status: COMPLETED | OUTPATIENT
Start: 2022-08-16 | End: 2022-08-16

## 2022-08-17 RX ORDER — METHYLPREDNISOLONE ACETATE 80 MG/ML
160 INJECTION, SUSPENSION INTRA-ARTICULAR; INTRALESIONAL; INTRAMUSCULAR; SOFT TISSUE
Status: COMPLETED | OUTPATIENT
Start: 2022-08-16 | End: 2022-08-16

## 2022-08-17 RX ORDER — LIDOCAINE HYDROCHLORIDE 20 MG/ML
1 INJECTION, SOLUTION INTRAVENOUS
Status: COMPLETED | OUTPATIENT
Start: 2022-08-16 | End: 2022-08-16

## 2022-11-17 ENCOUNTER — CLINICAL SUPPORT (OUTPATIENT)
Dept: ORTHOPEDIC SURGERY | Facility: CLINIC | Age: 82
End: 2022-11-17

## 2022-11-17 VITALS — BODY MASS INDEX: 24.84 KG/M2 | TEMPERATURE: 98.6 F | WEIGHT: 135 LBS | HEIGHT: 62 IN

## 2022-11-17 DIAGNOSIS — M70.62 GREATER TROCHANTERIC BURSITIS OF LEFT HIP: Primary | ICD-10-CM

## 2022-11-17 PROCEDURE — 20610 DRAIN/INJ JOINT/BURSA W/O US: CPT | Performed by: ORTHOPAEDIC SURGERY

## 2022-11-17 RX ORDER — LIDOCAINE HYDROCHLORIDE 10 MG/ML
2 INJECTION, SOLUTION EPIDURAL; INFILTRATION; INTRACAUDAL; PERINEURAL
Status: COMPLETED | OUTPATIENT
Start: 2022-11-17 | End: 2022-11-17

## 2022-11-17 RX ORDER — METHYLPREDNISOLONE ACETATE 80 MG/ML
160 INJECTION, SUSPENSION INTRA-ARTICULAR; INTRALESIONAL; INTRAMUSCULAR; SOFT TISSUE
Status: COMPLETED | OUTPATIENT
Start: 2022-11-17 | End: 2022-11-17

## 2022-11-17 RX ADMIN — METHYLPREDNISOLONE ACETATE 160 MG: 80 INJECTION, SUSPENSION INTRA-ARTICULAR; INTRALESIONAL; INTRAMUSCULAR; SOFT TISSUE at 15:48

## 2022-11-17 RX ADMIN — LIDOCAINE HYDROCHLORIDE 2 ML: 10 INJECTION, SOLUTION EPIDURAL; INFILTRATION; INTRACAUDAL; PERINEURAL at 15:48

## 2022-11-17 NOTE — PROGRESS NOTES
"Chief Complaint  Follow-up and Pain of the Right Hip and Follow-up and Pain of the Left Hip    Subjective    History of Present Illness      Eve Fung is a 82 y.o. female who presents to [facility] for Patient returns for follow-up on hip pain. The pain is over the lateral aspect of the bilateral hip at the greater trochanteric bursa.  Her pain has been progressive in nature and remains intermittent .   Her pain is worsened  rising after sitting. There has been improvement in the past with injections.      Objective   Vital Signs:   Temp 98.6 °F (37 °C)   Ht 157.5 cm (62\")   Wt 61.2 kg (135 lb)   BMI 24.69 kg/m²     Physical Exam  82 y.o. female is awake, alert, oriented, in no acute distress and well developed, well nourished.  Ortho Exam   BILATERAL hip  Bilateral hip (gtb): Skin and soft tissues over the greater trochanteric bursa are tender upon palpation, along with IT band tenderness. Cross body adduction is negative. Internal and external rotations are bothersome and cause tenderness over the greater trochanter. There is no clinical deformity and no shortening. She does not have a limp upon walking. There is not piriformis tightness and there  is not capsular tightness with any rotation. Dorsalis pedis and posterior tibial artery pulses are palpable. Neurovascular status is intact and capillary refill is less than 2 seconds. Common peroneal nerve function is well preserved.      Result Review :                  - Large Joint Arthrocentesis: bilateral hip joint on 11/17/2022 3:48 PM  Indications: pain  Details: 22 G needle  Medications (Right): 2 mL lidocaine PF 1% 1 %; 160 mg methylPREDNISolone acetate 80 MG/ML  Medications (Left): 160 mg methylPREDNISolone acetate 80 MG/ML; 2 mL lidocaine PF 1% 1 %  Outcome: tolerated well, no immediate complications  Procedure, treatment alternatives, risks and benefits explained, specific risks discussed. Consent was given by the " patient. Patient was prepped and draped in the usual sterile fashion.                Assessment   Assessment and Plan    Problem List Items Addressed This Visit    None      Follow Up   · Injected patient's bilateral hip-greater trochanteric bursa joint(s)with Depo-Medrol from an anterolateral approach   · Compression/brace   · Rest, ice, compression, and elevation (RICE) therapy  · OTC Ibuprofen 600mg by mouth every 6-8 hours as needed for pain and swelling  · Follow up in 3 month(s)  • Patient was given instructions and counseling regarding her condition or for health maintenance advice. Please see specific information pulled into the AVS if appropriate.     Ryan Chow MD   Date of Encounter: 11/17/2022   Electronically signed by Ryan Chow MD, 11/17/22, 3:48 PM EST.     EMR Dragon/Transcription disclaimer:  Much of this encounter note is an electronic transcription/translation of spoken language to printed text. The electronic translation of spoken language may permit erroneous, or at times, nonsensical words or phrases to be inadvertently transcribed; Although I have reviewed the note for such errors, some may still exist.

## 2023-02-28 ENCOUNTER — CLINICAL SUPPORT (OUTPATIENT)
Dept: ORTHOPEDIC SURGERY | Facility: CLINIC | Age: 83
End: 2023-02-28
Payer: MEDICARE

## 2023-02-28 VITALS — TEMPERATURE: 97.2 F | BODY MASS INDEX: 24.84 KG/M2 | HEIGHT: 62 IN | WEIGHT: 135 LBS

## 2023-02-28 DIAGNOSIS — M70.62 GREATER TROCHANTERIC BURSITIS OF LEFT HIP: Primary | ICD-10-CM

## 2023-02-28 PROCEDURE — 20610 DRAIN/INJ JOINT/BURSA W/O US: CPT | Performed by: PHYSICIAN ASSISTANT

## 2023-02-28 RX ORDER — LIDOCAINE HYDROCHLORIDE 10 MG/ML
2 INJECTION, SOLUTION EPIDURAL; INFILTRATION; INTRACAUDAL; PERINEURAL
Status: COMPLETED | OUTPATIENT
Start: 2023-02-28 | End: 2023-02-28

## 2023-02-28 RX ORDER — METHYLPREDNISOLONE ACETATE 80 MG/ML
80 INJECTION, SUSPENSION INTRA-ARTICULAR; INTRALESIONAL; INTRAMUSCULAR; SOFT TISSUE
Status: COMPLETED | OUTPATIENT
Start: 2023-02-28 | End: 2023-02-28

## 2023-02-28 RX ADMIN — METHYLPREDNISOLONE ACETATE 80 MG: 80 INJECTION, SUSPENSION INTRA-ARTICULAR; INTRALESIONAL; INTRAMUSCULAR; SOFT TISSUE at 13:47

## 2023-02-28 RX ADMIN — LIDOCAINE HYDROCHLORIDE 2 ML: 10 INJECTION, SOLUTION EPIDURAL; INFILTRATION; INTRACAUDAL; PERINEURAL at 13:47

## 2023-02-28 NOTE — PROGRESS NOTES
INJECTION      Patient: Eve Fung    MRN: [id number]    YOB: 1940    Chief Complaint   Patient presents with   • Left Hip - Follow-up, Pain   • Right Hip - Follow-up, Pain         History of Present Illness:  Eve Fung is here today for injection therapy. She receives LEFT hip greater trochanteric bursa injections of Depo-Medrol. Since last injection, patient notes mild relief of symptoms.  She reports she received injections in both hips last time and the following day became very sick, which lasted for about a week.  She reports nausea and some vomiting.  Discussed that I will decrease the dose of the Depo-Medrol for today's injection to see if that helps keep the nausea off treatment options have been discussed and she understands and consents.       Physical Exam:   82 y.o. female awake, alert, oriented, in no acute distress and well developed, well nourished.  LEFT hip  There is tenderness with palpation over the greater trochanteric bursa.   There is tenderness with palpation of the IT band.  Cross body adduction is positive.   Positive for pain over the greater trochanter with internal and external rotation.   There is no clinical deformity and no shortening of extremity.   She does have a limp upon walking.   There is piriformis tightness.  Dorsalis pedis and posterior tibial artery pulses are palpable.   Neurovascular status is intact and capillary refill is less than 2 seconds.   Common peroneal nerve function is well preserved.      - Large Joint Arthrocentesis: L greater trochanteric bursa on 2/28/2023 1:47 PM  Indications: pain  Details: 25 G needle, anteromedial approach  Medications: 80 mg methylPREDNISolone acetate 80 MG/ML; 2 mL lidocaine PF 1% 1 %  Outcome: tolerated well, no immediate complications  Procedure, treatment alternatives, risks and benefits explained, specific risks discussed. Consent was given by the patient. Immediately prior to procedure a time out was  called to verify the correct patient, procedure, equipment, support staff and site/side marked as required. Patient was prepped and draped in the usual sterile fashion.           Injection site was identified by physical examination and cleaned with Betadine and alcohol swabs. Prior to needle insertion, ethyl chloride spray was used for surface anesthesia. Sterile technique was used.       ASSESSMENT:  Diagnoses and all orders for this visit:    1. Greater trochanteric bursitis of left hip (Primary)  -     - Large Joint Arthrocentesis: L greater trochanteric bursa          PLAN:   • Left hip-greater trochanteric bursa Depo-Medrol 80 mg injection was discussed with the patient. Discussed indication, risks, benefits, and alternatives. Verbal consent was given to proceed with the procedure.   • Injection was performed from lateral approach.  Patient tolerated the procedure well and no complications were encountered.  • Discussion of orthopedic goals and activities and patient/guardian expressed understanding.  • Ice, heat, rest, compression and elevation of extremity as beneficial  • nsaids and/or tylenol as beneficial  • Instructed to refrain from heavy activity/rest the extremity for the next 24-48 hours  • Discussion regarding possibility of cortisol flare and what to expect if this occurs  • Watch for signs and symptoms of infection  • Call if adverse effect from injection therapy  • Follow up in 3 months.  Advise she can come back anytime if she needs the CMC joints or the right GTB injected.  She states if this injection does not make her sick she will return sooner.      Shay Tim PA-C  Encounter Date: 2/28/2023    Electronically signed by Shay Tim PA-C, 02/28/23, 1:46 PM EST.      EMR Dragon/Transcription disclaimer:  Much of this encounter note is an electronic transcription/translation of spoken language to printed text. The electronic translation of spoken language may permit  erroneous, or at times, nonsensical words or phrases to be inadvertently transcribed; Although I have reviewed the note for such errors, some may still exist.

## 2023-03-02 DIAGNOSIS — M18.12 PRIMARY OSTEOARTHRITIS OF FIRST CARPOMETACARPAL JOINT OF LEFT HAND: ICD-10-CM

## 2023-03-02 DIAGNOSIS — Z96.651 S/P TKR (TOTAL KNEE REPLACEMENT), RIGHT: Primary | ICD-10-CM

## 2023-03-02 DIAGNOSIS — M18.11 PRIMARY OSTEOARTHRITIS OF FIRST CARPOMETACARPAL JOINT OF RIGHT HAND: ICD-10-CM

## 2023-05-30 ENCOUNTER — CLINICAL SUPPORT (OUTPATIENT)
Dept: ORTHOPEDIC SURGERY | Facility: CLINIC | Age: 83
End: 2023-05-30

## 2023-05-30 ENCOUNTER — HOSPITAL ENCOUNTER (OUTPATIENT)
Dept: GENERAL RADIOLOGY | Facility: HOSPITAL | Age: 83
Discharge: HOME | End: 2023-05-30
Admitting: PHYSICIAN ASSISTANT

## 2023-05-30 VITALS — HEIGHT: 62 IN | BODY MASS INDEX: 23.92 KG/M2 | TEMPERATURE: 98.4 F | WEIGHT: 130 LBS

## 2023-05-30 DIAGNOSIS — M70.61 GREATER TROCHANTERIC BURSITIS OF RIGHT HIP: ICD-10-CM

## 2023-05-30 DIAGNOSIS — M70.62 GREATER TROCHANTERIC BURSITIS OF LEFT HIP: ICD-10-CM

## 2023-05-30 DIAGNOSIS — Z96.651 S/P TKR (TOTAL KNEE REPLACEMENT), RIGHT: ICD-10-CM

## 2023-05-30 DIAGNOSIS — Z96.651 S/P TKR (TOTAL KNEE REPLACEMENT), RIGHT: Primary | ICD-10-CM

## 2023-05-30 PROCEDURE — 73562 X-RAY EXAM OF KNEE 3: CPT

## 2023-05-30 RX ORDER — LIDOCAINE HYDROCHLORIDE 10 MG/ML
2 INJECTION, SOLUTION EPIDURAL; INFILTRATION; INTRACAUDAL; PERINEURAL
Status: COMPLETED | OUTPATIENT
Start: 2023-05-30 | End: 2023-05-30

## 2023-05-30 RX ORDER — METHYLPREDNISOLONE ACETATE 80 MG/ML
80 INJECTION, SUSPENSION INTRA-ARTICULAR; INTRALESIONAL; INTRAMUSCULAR; SOFT TISSUE
Status: COMPLETED | OUTPATIENT
Start: 2023-05-30 | End: 2023-05-30

## 2023-05-30 RX ADMIN — METHYLPREDNISOLONE ACETATE 80 MG: 80 INJECTION, SUSPENSION INTRA-ARTICULAR; INTRALESIONAL; INTRAMUSCULAR; SOFT TISSUE at 13:51

## 2023-05-30 RX ADMIN — LIDOCAINE HYDROCHLORIDE 2 ML: 10 INJECTION, SOLUTION EPIDURAL; INFILTRATION; INTRACAUDAL; PERINEURAL at 13:51

## 2023-05-30 RX ADMIN — METHYLPREDNISOLONE ACETATE 80 MG: 80 INJECTION, SUSPENSION INTRA-ARTICULAR; INTRALESIONAL; INTRAMUSCULAR; SOFT TISSUE at 13:50

## 2023-05-30 RX ADMIN — LIDOCAINE HYDROCHLORIDE 2 ML: 10 INJECTION, SOLUTION EPIDURAL; INFILTRATION; INTRACAUDAL; PERINEURAL at 13:50

## 2023-05-30 NOTE — PROGRESS NOTES
INJECTION      Patient: Eve Fung    MRN: [id number]    YOB: 1940    Chief Complaint   Patient presents with   • Right Hip - Follow-up, Pain   • Left Hip - Follow-up, Pain         History of Present Illness:  Eve Fung is here today for injection therapy. She receives LEFT hip greater trochanteric bursa injections of Depo-Medrol. Since last injection, patient notes mild relief of symptoms.  She reports doing well after the last hip injection since I decreased the dose of steroid. Today she would like to get both hips injected. Treatment options have been discussed and she understands and consents.       Physical Exam:   82 y.o. female awake, alert, oriented, in no acute distress and well developed, well nourished.  BILATERAL hip  There is tenderness with palpation over the greater trochanteric bursa.   There is tenderness with palpation of the IT band.  Cross body adduction is positive.   Positive for pain over the greater trochanter with internal and external rotation.   There is no clinical deformity and no shortening of extremity.   She does have a limp upon walking.   There is piriformis tightness.  Dorsalis pedis and posterior tibial artery pulses are palpable.   Neurovascular status is intact and capillary refill is less than 2 seconds.   Common peroneal nerve function is well preserved.      Large Joint Arthrocentesis: R greater trochanteric bursa  Date/Time: 5/30/2023 1:50 PM  Consent given by: patient  Site marked: site marked  Timeout: Immediately prior to procedure a time out was called to verify the correct patient, procedure, equipment, support staff and site/side marked as required   Supporting Documentation  Indications: pain   Procedure Details  Location: hip - R greater trochanteric bursa  Preparation: Patient was prepped and draped in the usual sterile fashion  Needle size: 25 G  Approach: lateral  Medications administered: 2 mL lidocaine PF 1% 1 %; 80 mg  methylPREDNISolone acetate 80 MG/ML  Patient tolerance: patient tolerated the procedure well with no immediate complications    Large Joint Arthrocentesis: L greater trochanteric bursa  Date/Time: 5/30/2023 1:51 PM  Consent given by: patient  Site marked: site marked  Timeout: Immediately prior to procedure a time out was called to verify the correct patient, procedure, equipment, support staff and site/side marked as required   Supporting Documentation  Indications: pain   Procedure Details  Location: hip - L greater trochanteric bursa  Preparation: Patient was prepped and draped in the usual sterile fashion  Needle size: 25 G  Approach: lateral  Medications administered: 2 mL lidocaine PF 1% 1 %; 80 mg methylPREDNISolone acetate 80 MG/ML  Patient tolerance: patient tolerated the procedure well with no immediate complications          Injection site was identified by physical examination and cleaned with Betadine and alcohol swabs. Prior to needle insertion, ethyl chloride spray was used for surface anesthesia. Sterile technique was used.       ASSESSMENT:  Diagnoses and all orders for this visit:    1. S/P TKR (total knee replacement), right (Primary)  -     XR Knee 3 View Right; Future    2. Greater trochanteric bursitis of left hip  -     Large Joint Arthrocentesis: L greater trochanteric bursa    3. Greater trochanteric bursitis of right hip  -     Large Joint Arthrocentesis: R greater trochanteric bursa          PLAN:   • Bilateral hip-greater trochanteric bursa Depo-Medrol 80 mg injection was discussed with the patient. Discussed indication, risks, benefits, and alternatives. Verbal consent was given to proceed with the procedure.   • Injection was performed from lateral approach.  Patient tolerated the procedure well and no complications were encountered.  • Discussion of orthopedic goals and activities and patient/guardian expressed understanding.  • Ice, heat, rest, compression and elevation of extremity  as beneficial  • nsaids and/or tylenol as beneficial  • Instructed to refrain from heavy activity/rest the extremity for the next 24-48 hours  • Discussion regarding possibility of cortisol flare and what to expect if this occurs  • Watch for signs and symptoms of infection  • Call if adverse effect from injection therapy  • Follow up in 3 months      Shay Tim PA-C  Encounter Date: 5/30/2023    Electronically signed by Shay Tim PA-C, 05/30/23, 2:16 PM EDT.       EMR Dragon/Transcription disclaimer:  Much of this encounter note is an electronic transcription/translation of spoken language to printed text. The electronic translation of spoken language may permit erroneous, or at times, nonsensical words or phrases to be inadvertently transcribed; Although I have reviewed the note for such errors, some may still exist.

## 2023-05-31 NOTE — PROGRESS NOTES
Would  you let patient know her xray of the knee replacement looks fine, no evidence of loosening from her fall.

## 2023-08-30 ENCOUNTER — CLINICAL SUPPORT (OUTPATIENT)
Dept: ORTHOPEDIC SURGERY | Facility: CLINIC | Age: 83
End: 2023-08-30
Payer: MEDICARE

## 2023-08-30 VITALS — BODY MASS INDEX: 23.92 KG/M2 | TEMPERATURE: 97.8 F | WEIGHT: 130 LBS | HEIGHT: 62 IN

## 2023-08-30 DIAGNOSIS — M70.62 GREATER TROCHANTERIC BURSITIS OF LEFT HIP: Primary | ICD-10-CM

## 2023-08-30 DIAGNOSIS — M70.61 GREATER TROCHANTERIC BURSITIS OF RIGHT HIP: ICD-10-CM

## 2023-08-30 RX ORDER — LIDOCAINE HYDROCHLORIDE 10 MG/ML
2 INJECTION, SOLUTION EPIDURAL; INFILTRATION; INTRACAUDAL; PERINEURAL
Status: COMPLETED | OUTPATIENT
Start: 2023-08-30 | End: 2023-08-30

## 2023-08-30 RX ORDER — METHYLPREDNISOLONE ACETATE 80 MG/ML
80 INJECTION, SUSPENSION INTRA-ARTICULAR; INTRALESIONAL; INTRAMUSCULAR; SOFT TISSUE
Status: COMPLETED | OUTPATIENT
Start: 2023-08-30 | End: 2023-08-30

## 2023-08-30 RX ADMIN — METHYLPREDNISOLONE ACETATE 160 MG: 80 INJECTION, SUSPENSION INTRA-ARTICULAR; INTRALESIONAL; INTRAMUSCULAR; SOFT TISSUE at 13:50

## 2023-08-30 RX ADMIN — METHYLPREDNISOLONE ACETATE 160 MG: 80 INJECTION, SUSPENSION INTRA-ARTICULAR; INTRALESIONAL; INTRAMUSCULAR; SOFT TISSUE at 13:48

## 2023-08-30 RX ADMIN — LIDOCAINE HYDROCHLORIDE 2 ML: 10 INJECTION, SOLUTION EPIDURAL; INFILTRATION; INTRACAUDAL; PERINEURAL at 13:20

## 2023-08-30 RX ADMIN — METHYLPREDNISOLONE ACETATE 80 MG: 80 INJECTION, SUSPENSION INTRA-ARTICULAR; INTRALESIONAL; INTRAMUSCULAR; SOFT TISSUE at 13:20

## 2023-08-30 RX ADMIN — LIDOCAINE HYDROCHLORIDE 2 ML: 10 INJECTION, SOLUTION EPIDURAL; INFILTRATION; INTRACAUDAL; PERINEURAL at 13:19

## 2023-08-30 RX ADMIN — METHYLPREDNISOLONE ACETATE 80 MG: 80 INJECTION, SUSPENSION INTRA-ARTICULAR; INTRALESIONAL; INTRAMUSCULAR; SOFT TISSUE at 13:19

## 2023-08-30 NOTE — PROGRESS NOTES
"Chief Complaint  Follow-up and Pain of the Right Hip and Follow-up and Pain of the Left Hip    Subjective    History of Present Illness      Eve Fung is a 82 y.o. female who presents to [facility] for injection therapy. She is receiving first time  BILATERAL hip injections of Depo-Medrol. Since last injection, patient notes mild relief of symptoms. I have been administering an 80mg dose of Depomedrol in each hip but today she would like to increase the dose. Treatment options have been discussed and she understands and consents.       Objective   Vital Signs:   Temp 97.8 øF (36.6 øC)   Ht 157.5 cm (62\")   Wt 59 kg (130 lb)   BMI 23.78 kg/mý     Physical Exam  82 y.o. female is awake, alert, oriented, in no acute distress and well developed, well nourished.  Ortho Exam   BILATERAL hip  There is tenderness with palpation over the greater trochanteric bursa.   There is tenderness with palpation of the IT band.  Cross body adduction is positive.   Positive for pain over the greater trochanter with internal and external rotation.   There is no clinical deformity and no shortening of extremity.   She does have a limp upon walking.   There is piriformis tightness.  Dorsalis pedis and posterior tibial artery pulses are palpable.   Neurovascular status is intact and capillary refill is less than 2 seconds.   Common peroneal nerve function is well preserved.        Result Review :        PROCEDURE  Large Joint Arthrocentesis: R greater trochanteric bursa  Date/Time: 8/30/2023 1:19 PM  Consent given by: patient  Site marked: site marked  Timeout: Immediately prior to procedure a time out was called to verify the correct patient, procedure, equipment, support staff and site/side marked as required   Supporting Documentation  Indications: pain   Procedure Details  Location: hip - R greater trochanteric bursa  Preparation: Patient was prepped and draped in the usual sterile fashion  Needle " size: 22 G  Approach: lateral  Medications administered: 2 mL lidocaine PF 1% 1 %; 80 mg methylPREDNISolone acetate 80 MG/ML  Patient tolerance: patient tolerated the procedure well with no immediate complications      Large Joint Arthrocentesis: L greater trochanteric bursa  Date/Time: 8/30/2023 1:20 PM  Consent given by: patient  Site marked: site marked  Timeout: Immediately prior to procedure a time out was called to verify the correct patient, procedure, equipment, support staff and site/side marked as required   Supporting Documentation  Indications: pain   Procedure Details  Location: hip - L greater trochanteric bursa  Preparation: Patient was prepped and draped in the usual sterile fashion  Needle size: 22 G  Medications administered: 2 mL lidocaine PF 1% 1 %; 80 mg methylPREDNISolone acetate 80 MG/ML  Patient tolerance: patient tolerated the procedure well with no immediate complications           Injection site was identified by physical examination and cleaned with Betadine and alcohol swabs. Prior to needle insertion, ethyl chloride spray was used for surface anesthesia. Sterile technique was used.       Assessment   Assessment and Plan    Problem List Items Addressed This Visit          Musculoskeletal and Injuries    Greater trochanteric bursitis of right hip    Relevant Medications    lidocaine PF 1% (XYLOCAINE) injection 2 mL (Completed)    methylPREDNISolone acetate (DEPO-medrol) injection 80 mg (Completed)    Other Relevant Orders    Large Joint Arthrocentesis: R greater trochanteric bursa (Completed)    Greater trochanteric bursitis of left hip - Primary    Relevant Medications    lidocaine PF 1% (XYLOCAINE) injection 2 mL (Completed)    methylPREDNISolone acetate (DEPO-medrol) injection 80 mg (Completed)    Other Relevant Orders    Large Joint Arthrocentesis: L greater trochanteric bursa (Completed)       Follow Up   Bilateral hip-greater trochanteric bursa Depo-Medrol 160mg injection was  discussed with the patient. Discussed indication, risks, benefits, and alternatives. Verbal consent was given to proceed with the procedure.   Injection was performed from anterolateral approach.  Patient tolerated the procedure well and no complications were encountered.  Discussion of orthopedic goals and activities and patient/guardian expressed understanding.  Ice, heat, rest, compression and elevation of extremity as beneficial  nsaids and/or tylenol as beneficial  Instructed to refrain from heavy activity/rest the extremity for the next 24-48 hours  Discussion regarding possibility of cortisol flare and what to expect if this occurs  Watch for signs and symptoms of infection  Call if adverse effect from injection therapy  Follow up in 3 months  Patient was given instructions and counseling regarding her condition or for health maintenance advice. Please see specific information pulled into the AVS if appropriate.     Shay Tim PA-C   Date of Encounter: 8/30/2023   Electronically signed by Shay Tim PA-C, 08/30/23, 1:29 PM EDT.     EMR Dragon/Transcription disclaimer:  Much of this encounter note is an electronic transcription/translation of spoken language to printed text. The electronic translation of spoken language may permit erroneous, or at times, nonsensical words or phrases to be inadvertently transcribed; Although I have reviewed the note for such errors, some may still exist.

## 2023-11-28 ENCOUNTER — CLINICAL SUPPORT (OUTPATIENT)
Dept: ORTHOPEDIC SURGERY | Facility: CLINIC | Age: 83
End: 2023-11-28
Payer: MEDICARE

## 2023-11-28 VITALS — WEIGHT: 130 LBS | BODY MASS INDEX: 23.92 KG/M2 | HEIGHT: 62 IN | TEMPERATURE: 97.8 F

## 2023-11-28 DIAGNOSIS — M70.61 GREATER TROCHANTERIC BURSITIS OF RIGHT HIP: ICD-10-CM

## 2023-11-28 DIAGNOSIS — M70.62 GREATER TROCHANTERIC BURSITIS OF LEFT HIP: Primary | ICD-10-CM

## 2023-11-28 RX ORDER — METHYLPREDNISOLONE ACETATE 80 MG/ML
160 INJECTION, SUSPENSION INTRA-ARTICULAR; INTRALESIONAL; INTRAMUSCULAR; SOFT TISSUE
Status: COMPLETED | OUTPATIENT
Start: 2023-11-28 | End: 2023-11-28

## 2023-11-28 RX ORDER — LIDOCAINE HYDROCHLORIDE 20 MG/ML
2 INJECTION, SOLUTION EPIDURAL; INFILTRATION; INTRACAUDAL; PERINEURAL
Status: COMPLETED | OUTPATIENT
Start: 2023-11-28 | End: 2023-11-28

## 2023-11-28 RX ADMIN — LIDOCAINE HYDROCHLORIDE 2 ML: 20 INJECTION, SOLUTION EPIDURAL; INFILTRATION; INTRACAUDAL; PERINEURAL at 13:18

## 2023-11-28 RX ADMIN — METHYLPREDNISOLONE ACETATE 160 MG: 80 INJECTION, SUSPENSION INTRA-ARTICULAR; INTRALESIONAL; INTRAMUSCULAR; SOFT TISSUE at 13:18

## 2023-11-28 RX ADMIN — LIDOCAINE HYDROCHLORIDE 2 ML: 20 INJECTION, SOLUTION EPIDURAL; INFILTRATION; INTRACAUDAL; PERINEURAL at 13:17

## 2023-11-28 RX ADMIN — METHYLPREDNISOLONE ACETATE 160 MG: 80 INJECTION, SUSPENSION INTRA-ARTICULAR; INTRALESIONAL; INTRAMUSCULAR; SOFT TISSUE at 13:17

## 2023-11-28 NOTE — PROGRESS NOTES
"Chief Complaint  Follow-up and Pain of the Right Hip and Follow-up and Pain of the Left Hip    Subjective    History of Present Illness      Eve Fung is a 83 y.o. female who presents to [facility] for injection therapy. She is receiving first time  BILATERAL hip injections of Depo-Medrol. Since last injection, patient notes great relief of symptoms. At last visit I increased her dose of steroid. Treatment options have been discussed and she understands and consents.       Objective   Vital Signs:   Temp 97.8 °F (36.6 °C)   Ht 157.5 cm (62\")   Wt 59 kg (130 lb)   BMI 23.78 kg/m²     Physical Exam  83 y.o. female is awake, alert, oriented, in no acute distress and well developed, well nourished.  Ortho Exam   BILATERAL hip  There is tenderness with palpation over the greater trochanteric bursa.   There is tenderness with palpation of the IT band.  Cross body adduction is positive.   Positive for pain over the greater trochanter with internal and external rotation.   There is no clinical deformity and no shortening of extremity.   She does have a limp upon walking.   There is piriformis tightness.  Dorsalis pedis and posterior tibial artery pulses are palpable.   Neurovascular status is intact and capillary refill is less than 2 seconds.   Common peroneal nerve function is well preserved.        Result Review :        PROCEDURE  Large Joint Arthrocentesis: R greater trochanteric bursa  Date/Time: 11/28/2023 1:17 PM  Consent given by: patient  Site marked: site marked  Timeout: Immediately prior to procedure a time out was called to verify the correct patient, procedure, equipment, support staff and site/side marked as required   Supporting Documentation  Indications: pain   Procedure Details  Location: hip - R greater trochanteric bursa  Preparation: Patient was prepped and draped in the usual sterile fashion  Needle size: 25 G  Approach: lateral  Medications administered: 2 mL " lidocaine PF 2% 2 %; 160 mg methylPREDNISolone acetate 80 MG/ML  Patient tolerance: patient tolerated the procedure well with no immediate complications      Large Joint Arthrocentesis: L greater trochanteric bursa  Date/Time: 11/28/2023 1:18 PM  Consent given by: patient  Site marked: site marked  Timeout: Immediately prior to procedure a time out was called to verify the correct patient, procedure, equipment, support staff and site/side marked as required   Supporting Documentation  Indications: pain   Procedure Details  Location: hip - L greater trochanteric bursa  Preparation: Patient was prepped and draped in the usual sterile fashion  Needle size: 25 G  Approach: lateral  Medications administered: 2 mL lidocaine PF 2% 2 %; 160 mg methylPREDNISolone acetate 80 MG/ML  Patient tolerance: patient tolerated the procedure well with no immediate complications         Injection site was identified by physical examination and cleaned with Betadine and alcohol swabs. Prior to needle insertion, ethyl chloride spray was used for surface anesthesia. Sterile technique was used.       Assessment   Assessment and Plan    Problem List Items Addressed This Visit          Musculoskeletal and Injuries    Greater trochanteric bursitis of right hip    Relevant Orders    Large Joint Arthrocentesis: R greater trochanteric bursa    Greater trochanteric bursitis of left hip - Primary    Relevant Orders    Large Joint Arthrocentesis: L greater trochanteric bursa         Follow Up   Bilateral hip-greater trochanteric bursa Depo-Medrol injection was discussed with the patient. Discussed indication, risks, benefits, and alternatives. Verbal consent was given to proceed with the procedure.   Injection was performed from anterolateral approach.  Patient tolerated the procedure well and no complications were encountered.  Discussion of orthopedic goals and activities and patient/guardian expressed understanding.  Ice, heat, rest, compression  and elevation of extremity as beneficial  nsaids and/or tylenol as beneficial  Instructed to refrain from heavy activity/rest the extremity for the next 24-48 hours  Discussion regarding possibility of cortisol flare and what to expect if this occurs  Watch for signs and symptoms of infection  Call if adverse effect from injection therapy  Follow up in 3 months  Patient was given instructions and counseling regarding her condition or for health maintenance advice. Please see specific information pulled into the AVS if appropriate.     Shay Tim PA-C   Date of Encounter: 11/28/2023   Electronically signed by Shay Tim PA-C, 11/28/23, 2:12 PM EST.     EMR Dragon/Transcription disclaimer:  Much of this encounter note is an electronic transcription/translation of spoken language to printed text. The electronic translation of spoken language may permit erroneous, or at times, nonsensical words or phrases to be inadvertently transcribed; Although I have reviewed the note for such errors, some may still exist.

## 2024-04-30 ENCOUNTER — HOSPITAL ENCOUNTER (INPATIENT)
Facility: HOSPITAL | Age: 84
LOS: 7 days | Discharge: NURSING, REHAB OR LONG-TERM CARE | DRG: 481 | End: 2024-05-07
Attending: STUDENT IN AN ORGANIZED HEALTH CARE EDUCATION/TRAINING PROGRAM | Admitting: STUDENT IN AN ORGANIZED HEALTH CARE EDUCATION/TRAINING PROGRAM
Payer: MEDICARE

## 2024-04-30 DIAGNOSIS — S72.8X1A OTHER FRACTURE OF RIGHT FEMUR, INITIAL ENCOUNTER FOR CLOSED FRACTURE: Primary | ICD-10-CM

## 2024-04-30 DIAGNOSIS — F41.9 ANXIETY: ICD-10-CM

## 2024-04-30 PROBLEM — K21.9 GERD WITHOUT ESOPHAGITIS: Status: ACTIVE | Noted: 2024-04-30

## 2024-04-30 PROBLEM — S72.009A HIP FRACTURE: Status: ACTIVE | Noted: 2024-04-30

## 2024-04-30 PROBLEM — S72.001A CLOSED RIGHT HIP FRACTURE: Status: ACTIVE | Noted: 2024-04-30

## 2024-04-30 LAB
ALBUMIN SERPL-MCNC: 3.9 G/DL (ref 3.5–5.2)
ALBUMIN/GLOB SERPL: 2.2 G/DL
ALP SERPL-CCNC: 69 U/L (ref 39–117)
ALT SERPL W P-5'-P-CCNC: 14 U/L (ref 1–33)
ANION GAP SERPL CALCULATED.3IONS-SCNC: 7.5 MMOL/L (ref 5–15)
AST SERPL-CCNC: 20 U/L (ref 1–32)
BILIRUB SERPL-MCNC: 0.7 MG/DL (ref 0–1.2)
BUN SERPL-MCNC: 15 MG/DL (ref 8–23)
BUN/CREAT SERPL: 26.8 (ref 7–25)
CALCIUM SPEC-SCNC: 9.1 MG/DL (ref 8.6–10.5)
CHLORIDE SERPL-SCNC: 102 MMOL/L (ref 98–107)
CO2 SERPL-SCNC: 27.5 MMOL/L (ref 22–29)
CREAT SERPL-MCNC: 0.56 MG/DL (ref 0.57–1)
DEPRECATED RDW RBC AUTO: 42.7 FL (ref 37–54)
EGFRCR SERPLBLD CKD-EPI 2021: 91.8 ML/MIN/1.73
ERYTHROCYTE [DISTWIDTH] IN BLOOD BY AUTOMATED COUNT: 12.9 % (ref 12.3–15.4)
GLOBULIN UR ELPH-MCNC: 1.8 GM/DL
GLUCOSE SERPL-MCNC: 161 MG/DL (ref 65–99)
HCT VFR BLD AUTO: 34.3 % (ref 34–46.6)
HGB BLD-MCNC: 11.7 G/DL (ref 12–15.9)
INR PPP: 1.03 (ref 0.9–1.1)
MAGNESIUM SERPL-MCNC: 1.8 MG/DL (ref 1.6–2.4)
MCH RBC QN AUTO: 30.8 PG (ref 26.6–33)
MCHC RBC AUTO-ENTMCNC: 34.1 G/DL (ref 31.5–35.7)
MCV RBC AUTO: 90.3 FL (ref 79–97)
PHOSPHATE SERPL-MCNC: 2.8 MG/DL (ref 2.5–4.5)
PLATELET # BLD AUTO: 238 10*3/MM3 (ref 140–450)
PMV BLD AUTO: 9.7 FL (ref 6–12)
POTASSIUM SERPL-SCNC: 3 MMOL/L (ref 3.5–5.2)
PROT SERPL-MCNC: 5.7 G/DL (ref 6–8.5)
PROTHROMBIN TIME: 13.7 SECONDS (ref 11.7–14.2)
RBC # BLD AUTO: 3.8 10*6/MM3 (ref 3.77–5.28)
SODIUM SERPL-SCNC: 137 MMOL/L (ref 136–145)
WBC NRBC COR # BLD AUTO: 11.05 10*3/MM3 (ref 3.4–10.8)

## 2024-04-30 PROCEDURE — 85610 PROTHROMBIN TIME: CPT | Performed by: STUDENT IN AN ORGANIZED HEALTH CARE EDUCATION/TRAINING PROGRAM

## 2024-04-30 PROCEDURE — 25810000003 SODIUM CHLORIDE 0.9 % SOLUTION: Performed by: STUDENT IN AN ORGANIZED HEALTH CARE EDUCATION/TRAINING PROGRAM

## 2024-04-30 PROCEDURE — 25010000002 HYDROMORPHONE PER 4 MG: Performed by: STUDENT IN AN ORGANIZED HEALTH CARE EDUCATION/TRAINING PROGRAM

## 2024-04-30 PROCEDURE — 83735 ASSAY OF MAGNESIUM: CPT | Performed by: STUDENT IN AN ORGANIZED HEALTH CARE EDUCATION/TRAINING PROGRAM

## 2024-04-30 PROCEDURE — 80053 COMPREHEN METABOLIC PANEL: CPT | Performed by: STUDENT IN AN ORGANIZED HEALTH CARE EDUCATION/TRAINING PROGRAM

## 2024-04-30 PROCEDURE — 84100 ASSAY OF PHOSPHORUS: CPT | Performed by: STUDENT IN AN ORGANIZED HEALTH CARE EDUCATION/TRAINING PROGRAM

## 2024-04-30 PROCEDURE — 25010000002 POTASSIUM CHLORIDE 10 MEQ/100ML SOLUTION: Performed by: STUDENT IN AN ORGANIZED HEALTH CARE EDUCATION/TRAINING PROGRAM

## 2024-04-30 PROCEDURE — 85027 COMPLETE CBC AUTOMATED: CPT | Performed by: STUDENT IN AN ORGANIZED HEALTH CARE EDUCATION/TRAINING PROGRAM

## 2024-04-30 RX ORDER — OXYCODONE AND ACETAMINOPHEN 7.5; 325 MG/1; MG/1
1 TABLET ORAL EVERY 4 HOURS PRN
Status: DISCONTINUED | OUTPATIENT
Start: 2024-04-30 | End: 2024-05-05

## 2024-04-30 RX ORDER — SODIUM CHLORIDE 9 MG/ML
40 INJECTION, SOLUTION INTRAVENOUS AS NEEDED
Status: DISCONTINUED | OUTPATIENT
Start: 2024-04-30 | End: 2024-05-07 | Stop reason: HOSPADM

## 2024-04-30 RX ORDER — SODIUM CHLORIDE 9 MG/ML
75 INJECTION, SOLUTION INTRAVENOUS CONTINUOUS
Status: DISCONTINUED | OUTPATIENT
Start: 2024-04-30 | End: 2024-05-03

## 2024-04-30 RX ORDER — PANTOPRAZOLE SODIUM 40 MG/1
40 TABLET, DELAYED RELEASE ORAL
Status: DISCONTINUED | OUTPATIENT
Start: 2024-05-01 | End: 2024-04-30 | Stop reason: SDUPTHER

## 2024-04-30 RX ORDER — POTASSIUM CHLORIDE 7.45 MG/ML
10 INJECTION INTRAVENOUS
Status: COMPLETED | OUTPATIENT
Start: 2024-05-01 | End: 2024-05-01

## 2024-04-30 RX ORDER — BISACODYL 5 MG/1
5 TABLET, DELAYED RELEASE ORAL DAILY PRN
Status: DISCONTINUED | OUTPATIENT
Start: 2024-04-30 | End: 2024-05-03

## 2024-04-30 RX ORDER — HYDROMORPHONE HYDROCHLORIDE 1 MG/ML
0.5 INJECTION, SOLUTION INTRAMUSCULAR; INTRAVENOUS; SUBCUTANEOUS
Status: DISPENSED | OUTPATIENT
Start: 2024-04-30 | End: 2024-05-05

## 2024-04-30 RX ORDER — PANTOPRAZOLE SODIUM 40 MG/1
40 TABLET, DELAYED RELEASE ORAL
Status: DISCONTINUED | OUTPATIENT
Start: 2024-05-01 | End: 2024-05-07 | Stop reason: HOSPADM

## 2024-04-30 RX ORDER — POTASSIUM CHLORIDE 750 MG/1
40 TABLET, FILM COATED, EXTENDED RELEASE ORAL EVERY 4 HOURS
Status: DISCONTINUED | OUTPATIENT
Start: 2024-04-30 | End: 2024-04-30

## 2024-04-30 RX ORDER — SODIUM CHLORIDE 0.9 % (FLUSH) 0.9 %
10 SYRINGE (ML) INJECTION AS NEEDED
Status: DISCONTINUED | OUTPATIENT
Start: 2024-04-30 | End: 2024-05-07 | Stop reason: HOSPADM

## 2024-04-30 RX ORDER — SODIUM CHLORIDE 0.9 % (FLUSH) 0.9 %
10 SYRINGE (ML) INJECTION EVERY 12 HOURS SCHEDULED
Status: DISCONTINUED | OUTPATIENT
Start: 2024-04-30 | End: 2024-05-07 | Stop reason: HOSPADM

## 2024-04-30 RX ORDER — CLONAZEPAM 0.5 MG/1
0.5 TABLET ORAL NIGHTLY PRN
Status: DISCONTINUED | OUTPATIENT
Start: 2024-04-30 | End: 2024-05-07 | Stop reason: HOSPADM

## 2024-04-30 RX ORDER — POLYETHYLENE GLYCOL 3350 17 G/17G
17 POWDER, FOR SOLUTION ORAL DAILY PRN
Status: DISCONTINUED | OUTPATIENT
Start: 2024-04-30 | End: 2024-05-03

## 2024-04-30 RX ORDER — BISACODYL 10 MG
10 SUPPOSITORY, RECTAL RECTAL DAILY PRN
Status: DISCONTINUED | OUTPATIENT
Start: 2024-04-30 | End: 2024-05-03

## 2024-04-30 RX ORDER — AMOXICILLIN 250 MG
2 CAPSULE ORAL 2 TIMES DAILY
Status: DISCONTINUED | OUTPATIENT
Start: 2024-04-30 | End: 2024-05-03

## 2024-04-30 RX ADMIN — SODIUM CHLORIDE 75 ML/HR: 9 INJECTION, SOLUTION INTRAVENOUS at 20:59

## 2024-04-30 RX ADMIN — DOCUSATE SODIUM 50MG AND SENNOSIDES 8.6MG 2 TABLET: 8.6; 5 TABLET, FILM COATED ORAL at 21:00

## 2024-04-30 RX ADMIN — OXYCODONE AND ACETAMINOPHEN 1 TABLET: 7.5; 325 TABLET ORAL at 21:57

## 2024-04-30 RX ADMIN — HYDROMORPHONE HYDROCHLORIDE 0.5 MG: 1 INJECTION, SOLUTION INTRAMUSCULAR; INTRAVENOUS; SUBCUTANEOUS at 23:45

## 2024-04-30 RX ADMIN — Medication 10 ML: at 21:00

## 2024-04-30 RX ADMIN — HYDROMORPHONE HYDROCHLORIDE 0.5 MG: 1 INJECTION, SOLUTION INTRAMUSCULAR; INTRAVENOUS; SUBCUTANEOUS at 21:00

## 2024-04-30 RX ADMIN — POTASSIUM CHLORIDE 10 MEQ: 7.46 INJECTION, SOLUTION INTRAVENOUS at 23:46

## 2024-04-30 NOTE — LETTER
EMS Transport Request  For use at [facility], [place], [place], [place], and [place] only   Patient Name: Eve Fung : 1940   Weight:63.3 kg (139 lb 8.8 oz) Pick-up Location: P794-1 BLS/ALS: BLS/ALS: BLS   Insurance: [employer] MEDICARE REPLACEMENT Auth End Date:    Pre-Cert #: D/C Summary complete:    Destination: Other [place]   Contact Precautions: None   Equipment (O2, Fluids, etc.): None   Arrive By Date/Time:  @ 1100 Stretcher/WC: Wheelchair   CM Requesting: Jane Dickey RN Ext: [phone]   Notes/Medical Necessity: right hip fracture     ______________________________________________________________________    *Only 2 patient bags OR 1 carry-on size bag are permitted.  Wheelchairs and walkers CANNOT transported with the patient. Acknowledge: Yes

## 2024-05-01 ENCOUNTER — APPOINTMENT (OUTPATIENT)
Dept: GENERAL RADIOLOGY | Facility: HOSPITAL | Age: 84
DRG: 481 | End: 2024-05-01
Payer: MEDICARE

## 2024-05-01 ENCOUNTER — ANESTHESIA (OUTPATIENT)
Dept: PERIOP | Facility: HOSPITAL | Age: 84
End: 2024-05-01
Payer: MEDICARE

## 2024-05-01 ENCOUNTER — ANESTHESIA EVENT (OUTPATIENT)
Dept: PERIOP | Facility: HOSPITAL | Age: 84
End: 2024-05-01
Payer: MEDICARE

## 2024-05-01 PROBLEM — S72.8X1A OTHER FRACTURE OF RIGHT FEMUR, INITIAL ENCOUNTER FOR CLOSED FRACTURE: Status: ACTIVE | Noted: 2024-04-30

## 2024-05-01 LAB
ABO GROUP BLD: NORMAL
ANION GAP SERPL CALCULATED.3IONS-SCNC: 7.3 MMOL/L (ref 5–15)
BLD GP AB SCN SERPL QL: NEGATIVE
BUN SERPL-MCNC: 14 MG/DL (ref 8–23)
BUN/CREAT SERPL: 29.2 (ref 7–25)
CALCIUM SPEC-SCNC: 8.8 MG/DL (ref 8.6–10.5)
CHLORIDE SERPL-SCNC: 102 MMOL/L (ref 98–107)
CO2 SERPL-SCNC: 26.7 MMOL/L (ref 22–29)
CREAT SERPL-MCNC: 0.48 MG/DL (ref 0.57–1)
DEPRECATED RDW RBC AUTO: 42.6 FL (ref 37–54)
EGFRCR SERPLBLD CKD-EPI 2021: 95.3 ML/MIN/1.73
ERYTHROCYTE [DISTWIDTH] IN BLOOD BY AUTOMATED COUNT: 12.8 % (ref 12.3–15.4)
GLUCOSE SERPL-MCNC: 120 MG/DL (ref 65–99)
HCT VFR BLD AUTO: 32.1 % (ref 34–46.6)
HGB BLD-MCNC: 10.8 G/DL (ref 12–15.9)
MAGNESIUM SERPL-MCNC: 2 MG/DL (ref 1.6–2.4)
MCH RBC QN AUTO: 30.3 PG (ref 26.6–33)
MCHC RBC AUTO-ENTMCNC: 33.6 G/DL (ref 31.5–35.7)
MCV RBC AUTO: 89.9 FL (ref 79–97)
PHOSPHATE SERPL-MCNC: 3.3 MG/DL (ref 2.5–4.5)
PLATELET # BLD AUTO: 209 10*3/MM3 (ref 140–450)
PMV BLD AUTO: 9.9 FL (ref 6–12)
POTASSIUM SERPL-SCNC: 4.8 MMOL/L (ref 3.5–5.2)
QT INTERVAL: 389 MS
QTC INTERVAL: 447 MS
RBC # BLD AUTO: 3.57 10*6/MM3 (ref 3.77–5.28)
RH BLD: POSITIVE
SODIUM SERPL-SCNC: 136 MMOL/L (ref 136–145)
T&S EXPIRATION DATE: NORMAL
WBC NRBC COR # BLD AUTO: 8.55 10*3/MM3 (ref 3.4–10.8)

## 2024-05-01 PROCEDURE — 93005 ELECTROCARDIOGRAM TRACING: CPT | Performed by: ANESTHESIOLOGY

## 2024-05-01 PROCEDURE — 86901 BLOOD TYPING SEROLOGIC RH(D): CPT | Performed by: STUDENT IN AN ORGANIZED HEALTH CARE EDUCATION/TRAINING PROGRAM

## 2024-05-01 PROCEDURE — 0SUV09Z SUPPLEMENT RIGHT KNEE JOINT, TIBIAL SURFACE WITH LINER, OPEN APPROACH: ICD-10-PCS | Performed by: STUDENT IN AN ORGANIZED HEALTH CARE EDUCATION/TRAINING PROGRAM

## 2024-05-01 PROCEDURE — C1713 ANCHOR/SCREW BN/BN,TIS/BN: HCPCS | Performed by: STUDENT IN AN ORGANIZED HEALTH CARE EDUCATION/TRAINING PROGRAM

## 2024-05-01 PROCEDURE — 25010000002 DEXAMETHASONE SODIUM PHOSPHATE 20 MG/5ML SOLUTION: Performed by: STUDENT IN AN ORGANIZED HEALTH CARE EDUCATION/TRAINING PROGRAM

## 2024-05-01 PROCEDURE — 86850 RBC ANTIBODY SCREEN: CPT | Performed by: STUDENT IN AN ORGANIZED HEALTH CARE EDUCATION/TRAINING PROGRAM

## 2024-05-01 PROCEDURE — 25010000002 ONDANSETRON PER 1 MG: Performed by: STUDENT IN AN ORGANIZED HEALTH CARE EDUCATION/TRAINING PROGRAM

## 2024-05-01 PROCEDURE — C1776 JOINT DEVICE (IMPLANTABLE): HCPCS | Performed by: STUDENT IN AN ORGANIZED HEALTH CARE EDUCATION/TRAINING PROGRAM

## 2024-05-01 PROCEDURE — 93010 ELECTROCARDIOGRAM REPORT: CPT | Performed by: INTERNAL MEDICINE

## 2024-05-01 PROCEDURE — 25810000003 LACTATED RINGERS PER 1000 ML: Performed by: ANESTHESIOLOGY

## 2024-05-01 PROCEDURE — 86900 BLOOD TYPING SEROLOGIC ABO: CPT | Performed by: STUDENT IN AN ORGANIZED HEALTH CARE EDUCATION/TRAINING PROGRAM

## 2024-05-01 PROCEDURE — 25010000002 VANCOMYCIN 1 G RECONSTITUTED SOLUTION: Performed by: STUDENT IN AN ORGANIZED HEALTH CARE EDUCATION/TRAINING PROGRAM

## 2024-05-01 PROCEDURE — 80048 BASIC METABOLIC PNL TOTAL CA: CPT | Performed by: STUDENT IN AN ORGANIZED HEALTH CARE EDUCATION/TRAINING PROGRAM

## 2024-05-01 PROCEDURE — 0QHB06Z INSERTION OF INTRAMEDULLARY INTERNAL FIXATION DEVICE INTO RIGHT LOWER FEMUR, OPEN APPROACH: ICD-10-PCS | Performed by: STUDENT IN AN ORGANIZED HEALTH CARE EDUCATION/TRAINING PROGRAM

## 2024-05-01 PROCEDURE — C1769 GUIDE WIRE: HCPCS | Performed by: STUDENT IN AN ORGANIZED HEALTH CARE EDUCATION/TRAINING PROGRAM

## 2024-05-01 PROCEDURE — 73552 X-RAY EXAM OF FEMUR 2/>: CPT

## 2024-05-01 PROCEDURE — 25010000002 CEFAZOLIN PER 500 MG: Performed by: STUDENT IN AN ORGANIZED HEALTH CARE EDUCATION/TRAINING PROGRAM

## 2024-05-01 PROCEDURE — 25010000002 HYDROMORPHONE PER 4 MG: Performed by: ANESTHESIOLOGY

## 2024-05-01 PROCEDURE — 25010000002 POTASSIUM CHLORIDE 10 MEQ/100ML SOLUTION: Performed by: STUDENT IN AN ORGANIZED HEALTH CARE EDUCATION/TRAINING PROGRAM

## 2024-05-01 PROCEDURE — 25010000002 HYDROMORPHONE PER 4 MG: Performed by: STUDENT IN AN ORGANIZED HEALTH CARE EDUCATION/TRAINING PROGRAM

## 2024-05-01 PROCEDURE — 86923 COMPATIBILITY TEST ELECTRIC: CPT

## 2024-05-01 PROCEDURE — 25010000002 FENTANYL CITRATE (PF) 50 MCG/ML SOLUTION: Performed by: ANESTHESIOLOGY

## 2024-05-01 PROCEDURE — 25010000002 SUGAMMADEX 200 MG/2ML SOLUTION: Performed by: STUDENT IN AN ORGANIZED HEALTH CARE EDUCATION/TRAINING PROGRAM

## 2024-05-01 PROCEDURE — 0SPC09Z REMOVAL OF LINER FROM RIGHT KNEE JOINT, OPEN APPROACH: ICD-10-PCS | Performed by: STUDENT IN AN ORGANIZED HEALTH CARE EDUCATION/TRAINING PROGRAM

## 2024-05-01 PROCEDURE — 25810000003 SODIUM CHLORIDE 0.9 % SOLUTION: Performed by: STUDENT IN AN ORGANIZED HEALTH CARE EDUCATION/TRAINING PROGRAM

## 2024-05-01 PROCEDURE — 25010000002 PROPOFOL 200 MG/20ML EMULSION: Performed by: ANESTHESIOLOGY

## 2024-05-01 PROCEDURE — 85027 COMPLETE CBC AUTOMATED: CPT | Performed by: STUDENT IN AN ORGANIZED HEALTH CARE EDUCATION/TRAINING PROGRAM

## 2024-05-01 PROCEDURE — 25010000002 HYDROMORPHONE 1 MG/ML SOLUTION: Performed by: STUDENT IN AN ORGANIZED HEALTH CARE EDUCATION/TRAINING PROGRAM

## 2024-05-01 PROCEDURE — 25010000002 PHENYLEPHRINE 10 MG/ML SOLUTION: Performed by: STUDENT IN AN ORGANIZED HEALTH CARE EDUCATION/TRAINING PROGRAM

## 2024-05-01 PROCEDURE — 83735 ASSAY OF MAGNESIUM: CPT | Performed by: STUDENT IN AN ORGANIZED HEALTH CARE EDUCATION/TRAINING PROGRAM

## 2024-05-01 PROCEDURE — 84100 ASSAY OF PHOSPHORUS: CPT | Performed by: STUDENT IN AN ORGANIZED HEALTH CARE EDUCATION/TRAINING PROGRAM

## 2024-05-01 PROCEDURE — 76000 FLUOROSCOPY <1 HR PHYS/QHP: CPT

## 2024-05-01 DEVICE — TRIGEN LOW PROFILE SCREW 5.0MM X 85MM
Type: IMPLANTABLE DEVICE | Site: FEMUR | Status: FUNCTIONAL
Brand: TRIGEN

## 2024-05-01 DEVICE — IMPLANTABLE DEVICE
Type: IMPLANTABLE DEVICE | Site: KNEE | Status: FUNCTIONAL
Brand: PERSONA® VIVACIT-E®

## 2024-05-01 DEVICE — TRIGEN META RETROGRADE FEMORAL                                    NAIL 11.5MM X 30CM
Type: IMPLANTABLE DEVICE | Site: FEMUR | Status: FUNCTIONAL
Brand: TRIGEN

## 2024-05-01 DEVICE — TRIGEN META NAIL CAP SET SCREW 0MM
Type: IMPLANTABLE DEVICE | Site: FEMUR | Status: FUNCTIONAL
Brand: TRIGEN

## 2024-05-01 DEVICE — TRIGEN LOW PROFILE SCREW 5.0MM X 55MM
Type: IMPLANTABLE DEVICE | Site: FEMUR | Status: FUNCTIONAL
Brand: TRIGEN

## 2024-05-01 DEVICE — VIOLET ANTIBACTERIAL POLYDIOXANONE, KNOTLESS TISSUE CONTROL DEVICE
Type: IMPLANTABLE DEVICE | Site: FEMUR | Status: FUNCTIONAL
Brand: STRATAFIX

## 2024-05-01 DEVICE — TRIGEN LOW PROFILE SCREW 5.0MM X 65MM
Type: IMPLANTABLE DEVICE | Site: FEMUR | Status: FUNCTIONAL
Brand: TRIGEN

## 2024-05-01 DEVICE — TRIGEN LOW PROFILE SCREW 5.0MM X 32.5MM
Type: IMPLANTABLE DEVICE | Site: FEMUR | Status: FUNCTIONAL
Brand: TRIGEN

## 2024-05-01 RX ORDER — PROPOFOL 10 MG/ML
INJECTION, EMULSION INTRAVENOUS AS NEEDED
Status: DISCONTINUED | OUTPATIENT
Start: 2024-05-01 | End: 2024-05-01 | Stop reason: SURG

## 2024-05-01 RX ORDER — FAMOTIDINE 10 MG/ML
20 INJECTION, SOLUTION INTRAVENOUS ONCE
Status: COMPLETED | OUTPATIENT
Start: 2024-05-01 | End: 2024-05-01

## 2024-05-01 RX ORDER — MAGNESIUM HYDROXIDE 1200 MG/15ML
LIQUID ORAL AS NEEDED
Status: DISCONTINUED | OUTPATIENT
Start: 2024-05-01 | End: 2024-05-01 | Stop reason: HOSPADM

## 2024-05-01 RX ORDER — NALOXONE HCL 0.4 MG/ML
0.4 VIAL (ML) INJECTION
Status: DISCONTINUED | OUTPATIENT
Start: 2024-05-01 | End: 2024-05-07 | Stop reason: HOSPADM

## 2024-05-01 RX ORDER — ROCURONIUM BROMIDE 10 MG/ML
INJECTION, SOLUTION INTRAVENOUS AS NEEDED
Status: DISCONTINUED | OUTPATIENT
Start: 2024-05-01 | End: 2024-05-01 | Stop reason: SURG

## 2024-05-01 RX ORDER — HYDRALAZINE HYDROCHLORIDE 20 MG/ML
5 INJECTION INTRAMUSCULAR; INTRAVENOUS
Status: DISCONTINUED | OUTPATIENT
Start: 2024-05-01 | End: 2024-05-01 | Stop reason: HOSPADM

## 2024-05-01 RX ORDER — HYDROMORPHONE HYDROCHLORIDE 1 MG/ML
0.25 INJECTION, SOLUTION INTRAMUSCULAR; INTRAVENOUS; SUBCUTANEOUS
Status: DISCONTINUED | OUTPATIENT
Start: 2024-05-01 | End: 2024-05-01 | Stop reason: HOSPADM

## 2024-05-01 RX ORDER — DEXAMETHASONE SODIUM PHOSPHATE 4 MG/ML
INJECTION, SOLUTION INTRA-ARTICULAR; INTRALESIONAL; INTRAMUSCULAR; INTRAVENOUS; SOFT TISSUE AS NEEDED
Status: DISCONTINUED | OUTPATIENT
Start: 2024-05-01 | End: 2024-05-01 | Stop reason: SURG

## 2024-05-01 RX ORDER — DIPHENHYDRAMINE HYDROCHLORIDE 50 MG/ML
12.5 INJECTION INTRAMUSCULAR; INTRAVENOUS
Status: DISCONTINUED | OUTPATIENT
Start: 2024-05-01 | End: 2024-05-01 | Stop reason: HOSPADM

## 2024-05-01 RX ORDER — LIDOCAINE HYDROCHLORIDE 20 MG/ML
INJECTION, SOLUTION INFILTRATION; PERINEURAL AS NEEDED
Status: DISCONTINUED | OUTPATIENT
Start: 2024-05-01 | End: 2024-05-01 | Stop reason: SURG

## 2024-05-01 RX ORDER — VANCOMYCIN HYDROCHLORIDE 1 G/20ML
INJECTION, POWDER, LYOPHILIZED, FOR SOLUTION INTRAVENOUS AS NEEDED
Status: DISCONTINUED | OUTPATIENT
Start: 2024-05-01 | End: 2024-05-01 | Stop reason: HOSPADM

## 2024-05-01 RX ORDER — FENTANYL CITRATE 50 UG/ML
25 INJECTION, SOLUTION INTRAMUSCULAR; INTRAVENOUS ONCE AS NEEDED
Status: DISCONTINUED | OUTPATIENT
Start: 2024-05-01 | End: 2024-05-01 | Stop reason: HOSPADM

## 2024-05-01 RX ORDER — TRANEXAMIC ACID 10 MG/ML
1000 INJECTION, SOLUTION INTRAVENOUS ONCE
Status: COMPLETED | OUTPATIENT
Start: 2024-05-01 | End: 2024-05-01

## 2024-05-01 RX ORDER — NALOXONE HCL 0.4 MG/ML
0.2 VIAL (ML) INJECTION AS NEEDED
Status: DISCONTINUED | OUTPATIENT
Start: 2024-05-01 | End: 2024-05-01 | Stop reason: HOSPADM

## 2024-05-01 RX ORDER — DROPERIDOL 2.5 MG/ML
0.62 INJECTION, SOLUTION INTRAMUSCULAR; INTRAVENOUS
Status: DISCONTINUED | OUTPATIENT
Start: 2024-05-01 | End: 2024-05-01 | Stop reason: HOSPADM

## 2024-05-01 RX ORDER — PHENYLEPHRINE HYDROCHLORIDE 10 MG/ML
INJECTION INTRAVENOUS AS NEEDED
Status: DISCONTINUED | OUTPATIENT
Start: 2024-05-01 | End: 2024-05-01 | Stop reason: SURG

## 2024-05-01 RX ORDER — SODIUM CHLORIDE, SODIUM LACTATE, POTASSIUM CHLORIDE, CALCIUM CHLORIDE 600; 310; 30; 20 MG/100ML; MG/100ML; MG/100ML; MG/100ML
INJECTION, SOLUTION INTRAVENOUS CONTINUOUS PRN
Status: DISCONTINUED | OUTPATIENT
Start: 2024-05-01 | End: 2024-05-01 | Stop reason: SURG

## 2024-05-01 RX ORDER — LIDOCAINE HYDROCHLORIDE 10 MG/ML
0.5 INJECTION, SOLUTION INFILTRATION; PERINEURAL ONCE AS NEEDED
Status: DISCONTINUED | OUTPATIENT
Start: 2024-05-01 | End: 2024-05-01 | Stop reason: HOSPADM

## 2024-05-01 RX ORDER — EPHEDRINE SULFATE 50 MG/ML
5 INJECTION, SOLUTION INTRAVENOUS ONCE AS NEEDED
Status: DISCONTINUED | OUTPATIENT
Start: 2024-05-01 | End: 2024-05-01 | Stop reason: HOSPADM

## 2024-05-01 RX ORDER — FENTANYL CITRATE 50 UG/ML
25 INJECTION, SOLUTION INTRAMUSCULAR; INTRAVENOUS
Status: DISCONTINUED | OUTPATIENT
Start: 2024-05-01 | End: 2024-05-01 | Stop reason: HOSPADM

## 2024-05-01 RX ORDER — SODIUM CHLORIDE 0.9 % (FLUSH) 0.9 %
3 SYRINGE (ML) INJECTION EVERY 12 HOURS SCHEDULED
Status: DISCONTINUED | OUTPATIENT
Start: 2024-05-01 | End: 2024-05-01 | Stop reason: HOSPADM

## 2024-05-01 RX ORDER — MIDAZOLAM HYDROCHLORIDE 1 MG/ML
0.5 INJECTION INTRAMUSCULAR; INTRAVENOUS
Status: DISCONTINUED | OUTPATIENT
Start: 2024-05-01 | End: 2024-05-01 | Stop reason: HOSPADM

## 2024-05-01 RX ORDER — IPRATROPIUM BROMIDE AND ALBUTEROL SULFATE 2.5; .5 MG/3ML; MG/3ML
3 SOLUTION RESPIRATORY (INHALATION) ONCE AS NEEDED
Status: DISCONTINUED | OUTPATIENT
Start: 2024-05-01 | End: 2024-05-01 | Stop reason: HOSPADM

## 2024-05-01 RX ORDER — SODIUM CHLORIDE 0.9 % (FLUSH) 0.9 %
3-10 SYRINGE (ML) INJECTION AS NEEDED
Status: DISCONTINUED | OUTPATIENT
Start: 2024-05-01 | End: 2024-05-01 | Stop reason: HOSPADM

## 2024-05-01 RX ORDER — FLUMAZENIL 0.1 MG/ML
0.2 INJECTION INTRAVENOUS AS NEEDED
Status: DISCONTINUED | OUTPATIENT
Start: 2024-05-01 | End: 2024-05-01 | Stop reason: HOSPADM

## 2024-05-01 RX ORDER — PROMETHAZINE HYDROCHLORIDE 25 MG/1
25 SUPPOSITORY RECTAL ONCE AS NEEDED
Status: DISCONTINUED | OUTPATIENT
Start: 2024-05-01 | End: 2024-05-01 | Stop reason: HOSPADM

## 2024-05-01 RX ORDER — ONDANSETRON 2 MG/ML
INJECTION INTRAMUSCULAR; INTRAVENOUS AS NEEDED
Status: DISCONTINUED | OUTPATIENT
Start: 2024-05-01 | End: 2024-05-01 | Stop reason: SURG

## 2024-05-01 RX ORDER — PROMETHAZINE HYDROCHLORIDE 25 MG/1
25 TABLET ORAL ONCE AS NEEDED
Status: DISCONTINUED | OUTPATIENT
Start: 2024-05-01 | End: 2024-05-01 | Stop reason: HOSPADM

## 2024-05-01 RX ORDER — SODIUM CHLORIDE, SODIUM LACTATE, POTASSIUM CHLORIDE, CALCIUM CHLORIDE 600; 310; 30; 20 MG/100ML; MG/100ML; MG/100ML; MG/100ML
9 INJECTION, SOLUTION INTRAVENOUS CONTINUOUS
Status: DISCONTINUED | OUTPATIENT
Start: 2024-05-01 | End: 2024-05-07 | Stop reason: HOSPADM

## 2024-05-01 RX ORDER — ONDANSETRON 2 MG/ML
4 INJECTION INTRAMUSCULAR; INTRAVENOUS EVERY 4 HOURS PRN
Status: DISCONTINUED | OUTPATIENT
Start: 2024-05-01 | End: 2024-05-07 | Stop reason: HOSPADM

## 2024-05-01 RX ORDER — LABETALOL HYDROCHLORIDE 5 MG/ML
5 INJECTION, SOLUTION INTRAVENOUS
Status: DISCONTINUED | OUTPATIENT
Start: 2024-05-01 | End: 2024-05-01 | Stop reason: HOSPADM

## 2024-05-01 RX ORDER — ONDANSETRON 2 MG/ML
4 INJECTION INTRAMUSCULAR; INTRAVENOUS ONCE AS NEEDED
Status: DISCONTINUED | OUTPATIENT
Start: 2024-05-01 | End: 2024-05-01 | Stop reason: HOSPADM

## 2024-05-01 RX ADMIN — POTASSIUM CHLORIDE 10 MEQ: 7.46 INJECTION, SOLUTION INTRAVENOUS at 03:48

## 2024-05-01 RX ADMIN — SODIUM CHLORIDE 2000 MG: 900 INJECTION INTRAVENOUS at 23:36

## 2024-05-01 RX ADMIN — FENTANYL CITRATE 25 MCG: 50 INJECTION, SOLUTION INTRAMUSCULAR; INTRAVENOUS at 21:18

## 2024-05-01 RX ADMIN — LIDOCAINE HYDROCHLORIDE 60 MG: 20 INJECTION, SOLUTION INFILTRATION; PERINEURAL at 17:19

## 2024-05-01 RX ADMIN — TRANEXAMIC ACID 1000 MG: 10 INJECTION, SOLUTION INTRAVENOUS at 21:56

## 2024-05-01 RX ADMIN — POTASSIUM CHLORIDE 10 MEQ: 7.46 INJECTION, SOLUTION INTRAVENOUS at 00:49

## 2024-05-01 RX ADMIN — ONDANSETRON 4 MG: 2 INJECTION INTRAMUSCULAR; INTRAVENOUS at 19:32

## 2024-05-01 RX ADMIN — Medication 10 ML: at 08:29

## 2024-05-01 RX ADMIN — SODIUM CHLORIDE 75 ML/HR: 9 INJECTION, SOLUTION INTRAVENOUS at 22:19

## 2024-05-01 RX ADMIN — POTASSIUM CHLORIDE 10 MEQ: 7.46 INJECTION, SOLUTION INTRAVENOUS at 02:43

## 2024-05-01 RX ADMIN — HYDROMORPHONE HYDROCHLORIDE 0.5 MG: 1 INJECTION, SOLUTION INTRAMUSCULAR; INTRAVENOUS; SUBCUTANEOUS at 19:32

## 2024-05-01 RX ADMIN — HYDROMORPHONE HYDROCHLORIDE 0.5 MG: 1 INJECTION, SOLUTION INTRAMUSCULAR; INTRAVENOUS; SUBCUTANEOUS at 12:50

## 2024-05-01 RX ADMIN — TRANEXAMIC ACID 1000 MG: 10 INJECTION, SOLUTION INTRAVENOUS at 19:21

## 2024-05-01 RX ADMIN — HYDROMORPHONE HYDROCHLORIDE 0.25 MG: 1 INJECTION, SOLUTION INTRAMUSCULAR; INTRAVENOUS; SUBCUTANEOUS at 21:47

## 2024-05-01 RX ADMIN — SODIUM CHLORIDE, POTASSIUM CHLORIDE, SODIUM LACTATE AND CALCIUM CHLORIDE 9 ML/HR: 600; 310; 30; 20 INJECTION, SOLUTION INTRAVENOUS at 17:05

## 2024-05-01 RX ADMIN — PHENYLEPHRINE HYDROCHLORIDE 100 MCG: 10 INJECTION INTRAVENOUS at 18:33

## 2024-05-01 RX ADMIN — TRANEXAMIC ACID 1000 MG: 10 INJECTION, SOLUTION INTRAVENOUS at 17:34

## 2024-05-01 RX ADMIN — PANTOPRAZOLE SODIUM 40 MG: 40 TABLET, DELAYED RELEASE ORAL at 04:51

## 2024-05-01 RX ADMIN — HYDROMORPHONE HYDROCHLORIDE 0.5 MG: 1 INJECTION, SOLUTION INTRAMUSCULAR; INTRAVENOUS; SUBCUTANEOUS at 08:29

## 2024-05-01 RX ADMIN — HYDROMORPHONE HYDROCHLORIDE 0.25 MG: 1 INJECTION, SOLUTION INTRAMUSCULAR; INTRAVENOUS; SUBCUTANEOUS at 20:39

## 2024-05-01 RX ADMIN — FENTANYL CITRATE 25 MCG: 50 INJECTION, SOLUTION INTRAMUSCULAR; INTRAVENOUS at 20:55

## 2024-05-01 RX ADMIN — SODIUM CHLORIDE, POTASSIUM CHLORIDE, SODIUM LACTATE AND CALCIUM CHLORIDE 9 ML/HR: 600; 310; 30; 20 INJECTION, SOLUTION INTRAVENOUS at 21:00

## 2024-05-01 RX ADMIN — POTASSIUM CHLORIDE 10 MEQ: 7.46 INJECTION, SOLUTION INTRAVENOUS at 04:51

## 2024-05-01 RX ADMIN — ROCURONIUM BROMIDE 35 MG: 10 INJECTION, SOLUTION INTRAVENOUS at 17:19

## 2024-05-01 RX ADMIN — HYDROMORPHONE HYDROCHLORIDE 0.25 MG: 1 INJECTION, SOLUTION INTRAMUSCULAR; INTRAVENOUS; SUBCUTANEOUS at 21:25

## 2024-05-01 RX ADMIN — PROPOFOL 50 MG: 10 INJECTION, EMULSION INTRAVENOUS at 17:19

## 2024-05-01 RX ADMIN — DEXAMETHASONE SODIUM PHOSPHATE 8 MG: 4 INJECTION, SOLUTION INTRAMUSCULAR; INTRAVENOUS at 18:04

## 2024-05-01 RX ADMIN — FAMOTIDINE 20 MG: 10 INJECTION INTRAVENOUS at 17:01

## 2024-05-01 RX ADMIN — SUGAMMADEX 200 MG: 100 INJECTION, SOLUTION INTRAVENOUS at 19:32

## 2024-05-01 RX ADMIN — SODIUM CHLORIDE 2000 MG: 900 INJECTION INTRAVENOUS at 17:05

## 2024-05-01 RX ADMIN — POTASSIUM CHLORIDE 10 MEQ: 7.46 INJECTION, SOLUTION INTRAVENOUS at 01:45

## 2024-05-01 RX ADMIN — HYDROMORPHONE HYDROCHLORIDE 0.25 MG: 1 INJECTION, SOLUTION INTRAMUSCULAR; INTRAVENOUS; SUBCUTANEOUS at 22:14

## 2024-05-01 RX ADMIN — PHENYLEPHRINE HYDROCHLORIDE 100 MCG: 10 INJECTION INTRAVENOUS at 18:02

## 2024-05-01 RX ADMIN — SODIUM CHLORIDE, POTASSIUM CHLORIDE, SODIUM LACTATE AND CALCIUM CHLORIDE: 600; 310; 30; 20 INJECTION, SOLUTION INTRAVENOUS at 17:13

## 2024-05-01 RX ADMIN — HYDROMORPHONE HYDROCHLORIDE 0.5 MG: 1 INJECTION, SOLUTION INTRAMUSCULAR; INTRAVENOUS; SUBCUTANEOUS at 02:42

## 2024-05-01 RX ADMIN — PHENYLEPHRINE HYDROCHLORIDE 100 MCG: 10 INJECTION INTRAVENOUS at 18:14

## 2024-05-01 NOTE — ANESTHESIA PREPROCEDURE EVALUATION
Anesthesia Evaluation     Patient summary reviewed and Nursing notes reviewed   NPO Solid Status: > 6 hours  NPO Liquid Status: > 2 hours           Airway   Mallampati: III  TM distance: >3 FB  Neck ROM: limited  Small opening and Possible difficult intubation  Dental - normal exam     Pulmonary    (-) COPD, asthma, not a smoker  Cardiovascular   Exercise tolerance: good (4-7 METS)    (-) past MI, dysrhythmias, angina      Neuro/Psych  (+) psychiatric history Anxiety  (-) seizures, CVA  GI/Hepatic/Renal/Endo    (+) GERD well controlled  (-) liver disease, no renal disease, diabetes, no thyroid disorder    Musculoskeletal     Abdominal    Substance History      OB/GYN          Other   arthritis,                 Anesthesia Plan    ASA 3     general       Anesthetic plan, risks, benefits, and alternatives have been provided, discussed and informed consent has been obtained with: patient.    CODE STATUS:    Level Of Support Discussed With: Patient  Code Status (Patient has no pulse and is not breathing): CPR (Attempt to Resuscitate)  Medical Interventions (Patient has pulse or is breathing): Full Support

## 2024-05-01 NOTE — PLAN OF CARE
Goal Outcome Evaluation:  Plan of Care Reviewed With: patient        Progress: no change  Outcome Evaluation: recieved from [facility] for fall and rt hip fracture, pt is alert ariented, rt hip and knee swoole with tenderness, c/o pain medicated with iv dilaudid, bed alarm for safety, RLE elevated in a pillow, seen by Dr. Sawyer last night, consult Dr. Monte, keep npo since MN, pot 3.0 treated with iv potassium x6 doses, son at bedside, skin prep done, continue to monitor the pt.

## 2024-05-01 NOTE — SIGNIFICANT NOTE
05/01/24 0921   OTHER   Discipline occupational therapist   Rehab Time/Intention   Session Not Performed other (see comments)  (Spoke with RN and OR planned for later this PM. OT will f/u post op next service date.)   Recommendation   OT - Next Appointment 05/02/24

## 2024-05-01 NOTE — PROGRESS NOTES
Discharge Planning Assessment  [facility]     Patient Name: Eve Fung  MRN: [id number]  Today's Date: 5/1/2024    Admit Date: 4/30/2024    Plan: Skilled rehab   Discharge Needs Assessment       Row Name 05/01/24 1547       Living Environment    People in Home child(nadine), adult    Name(s) of People in Home lives with her son Diony    Current Living Arrangements home    Primary Care Provided by self    Family Caregiver if Needed child(nadine), adult    Family Caregiver Names sons Tyrese Vora and Dino    Quality of Family Relationships involved;supportive;helpful    Able to Return to Prior Arrangements yes       Transition Planning    Patient/Family Anticipates Transition to inpatient rehabilitation facility    Patient/Family Anticipated Services at Transition skilled nursing    Transportation Anticipated health plan transportation       Discharge Needs Assessment    Anticipated Changes Related to Illness inability to care for self    Discharge Facility/Level of Care Needs nursing facility, basic    Provided Post Acute Provider List? Yes    Post Acute Provider List Nursing Home    Delivered To Support Person    Support Person son Tyrese Aragon [phone]    Method of Delivery In person                   Discharge Plan       Row Name 05/01/24 3851       Plan    Plan Skilled rehab    Roadmap to Recovery Yes    Patient/Family in Agreement with Plan yes    Provided Post Acute Provider List? Yes    Method of Delivery In person    Plan Comments Spoke with patient and sons Tyrese [phone] and Dino [phone], at bedside. She lives with her son Diony in his home, he helps her as needed. She is independent with ADL's at home and denies using any medical equipment. Sons stated she will need skilled rehab at discharge. [facility] provided Medicare.gov compare and Road to Recovery. [facility] will follow up for skilled rehab choices. Luis CARMICHAEL                  Continued Care and Services - Admitted Since 4/30/2024    No  active coordination exists for this encounter.          Demographic Summary       Row Name 05/01/24 1545       General Information    Admission Type inpatient    Arrived From emergency department    Referral Source admission list    Reason for Consult discharge planning    Preferred Language English                   Functional Status       Row Name 05/01/24 1546       Functional Status    Usual Activity Tolerance fair    Current Activity Tolerance poor       Functional Status, IADL    Medications independent    Meal Preparation independent    Housekeeping independent    Laundry independent    Shopping independent                   Psychosocial    No documentation.                  Abuse/Neglect    No documentation.                  Legal    No documentation.                  Substance Abuse    No documentation.                  Patient Forms    No documentation.                     Debi Myles RN

## 2024-05-01 NOTE — PROGRESS NOTES
Right distal femoral shaft fracture around a total knee. Dr. Monte saw as consult. NPO, plan on right retrograde femoral today at 5:00pm.

## 2024-05-01 NOTE — PROGRESS NOTES
"    Name: Eve Fung ADMIT: 2024   : 1940  PCP: Siddhartha Eve GEORGETTE, RADHA    MRN: [id number] LOS: 1 days   AGE/SEX: 83 y.o. female  ROOM: [facility]     Subjective   Subjective   Patient is lying on the bed and does not appear to be any major distress.  Denies nausea, vomiting, abdominal pain, chest pain.       Objective   Objective   Vital Signs  Temp:  [97 °F (36.1 °C)-98.2 °F (36.8 °C)] 98.2 °F (36.8 °C)  Heart Rate:  [65-71] 65  Resp:  [16-18] 16  BP: (134-138)/(70-76) 138/76  SpO2:  [94 %-100 %] 99 %  on  Flow (L/min):  [2] 2;   Device (Oxygen Therapy): nasal cannula  Body mass index is 24.27 kg/m².  Physical Exam  HEENT: PERRLA, extraocular movements intact, Scleras no icterus  Neck: Supple, no JVD  Cardiovascular: Regular rate and rhythm with normal S1 and S2  Respiratory: Fairly clear to auscultation anteriorly with no wheezes  GI: Soft, nontender, bowel sounds are present  Extremities: No edema, palpable pulses, right leg is mildly reduced and externally rotated  Neurologic: Grossly nonfocal, no facial asymmetry    Results Review     I reviewed the patient's new clinical results.  Results from last 7 days   Lab Units 24  0530 24  2151   WBC 10*3/mm3 8.55 11.05*   HEMOGLOBIN g/dL 10.8* 11.7*   PLATELETS 10*3/mm3 209 238     Results from last 7 days   Lab Units 24  0530 24  2150   SODIUM mmol/L 136 137   POTASSIUM mmol/L 4.8 3.0*   CHLORIDE mmol/L 102 102   CO2 mmol/L 26.7 27.5   BUN mg/dL 14 15   CREATININE mg/dL 0.48* 0.56*   GLUCOSE mg/dL 120* 161*   EGFR mL/min/1.73 95.3 91.8     Results from last 7 days   Lab Units 24  2150   ALBUMIN g/dL 3.9   BILIRUBIN mg/dL 0.7   ALK PHOS U/L 69   AST (SGOT) U/L 20   ALT (SGPT) U/L 14     Results from last 7 days   Lab Units 24  0530 24  2150   CALCIUM mg/dL 8.8 9.1   ALBUMIN g/dL  --  3.9   MAGNESIUM mg/dL 2.0 1.8   PHOSPHORUS mg/dL 3.3 2.8       No results found for: \"HGBA1C\", \"POCGLU\"    Mammo Screening Digital " Tomosynthesis Bilateral With CAD    Result Date: 4/30/2024  FINAL IMPRESSION: ACR BI-RADS 2: Benign findings. RECOMMENDATIONS: Routine annual screening mammography. A letter including results and recommendations was sent to the patient. Density notification was provided to patients with type 3 or 4 breast tissue pattern. Patient information entered into a reminder system with a target due date for the next mammogram. At our facility, a [place] marker is positioned over a visible skin lesion and a linear marker is used to indicate a scar. A triangular marker is placed on a self reported palpable finding. Note: Mammography does not detect approximately 10-15% of breast cancers. An annual clinical breast exam by the patient's breast care physician and regular monthly self breast exams by the patient are integral parts of breast cancer screening, in addition to annual mammography. A normal mammogram does not completely exclude the presence of breast cancer, especially if there is an abnormal finding on physical exam. When clinically indicated, a biopsy should not be deferred because of a normal mammogram report. cc:     DXA bone density spine and hip    Result Date: 4/30/2024  1. Osteoporosis BMD of the lumbar spine. Elevated risk for fracture. 2. Osteoporosis BMD of the left femoral neck. Elevated risk for fracture. Images reviewed, interpreted, and dictated by Dr. Jose Bullock. Transcribed by Shin Musa PA-C.     I have personally reviewed all medications:  Scheduled Medications  pantoprazole, 40 mg, Oral, Q AM  senna-docusate sodium, 2 tablet, Oral, BID  sodium chloride, 10 mL, Intravenous, Q12H    Infusions  sodium chloride, 75 mL/hr, Last Rate: 75 mL/hr (04/30/24 2059)    Diet  NPO Diet NPO Type: Sips with Meds    I have personally reviewed:  [x]  Laboratory   [x]  Microbiology   [x]  Radiology   [x]  EKG/Telemetry  [x]  Cardiology/Vascular   []  Pathology    []  Records       Assessment/Plan     Active Hospital  Problems    Diagnosis  POA    **Closed right hip fracture [S72.001A]  Yes    GERD without esophagitis [K21.9]  Unknown    Other fracture of right femur, initial encounter for closed fracture [S72.8X1A]  Unknown    Degeneration of lumbar intervertebral disc [M51.36]  Yes    Anxiety [F41.9]  Yes    Chronic back pain [M54.9, G89.29]  Yes    S/P TKR (total knee replacement), right [Z96.651]  Not Applicable      Resolved Hospital Problems   No resolved problems to display.     1.Right femur fracture, orthopedic surgery consult has been obtained and defer timing of surgery to Ortho as continue with analgesics for pain control.  Will be n.p.o. after midnight.    2.  GERD, on acid suppressive therapy.    3.  Hypokalemia, replace as needed.    4.  Reactive leukocytosis, WBC is back to normal.  She denies any dysuria and no reports of any cough or sputum production.    5.  On SCDs for DVT prophylaxis.        Jesse Oakley MD  [facility]  05/01/24  14:28 EDT

## 2024-05-01 NOTE — CONSULTS
Orthopaedic Surgery  Consult Note  Dr. LUIS ALBERTO Norris” Lavell II  [phone]    HPI:  Patient is a 83 y.o. Not  or  female who presents with right leg pain after a fall.  Patient has a history of a right total hip performed by Dr. Chow years ago.  She presented to an outside hospital where x-rays were taken that demonstrated a distal femur supracondylar fracture without extension into the joint or prosthetic.  His surgeon recommended transfer to another facility for higher level of care.  He had a difficult time obtaining a transfer and I was actually called on my cell phone yesterday.  While I was not on-call, I did offer to help out.  She did not make it into the hospital until quite late last night.  She complains of sharp pains in the right leg worse with any activity made better with bedrest immobilization and narcotic administration.    MEDICAL HISTORY  Past Medical History:   Diagnosis Date    Osteoporosis      Past Surgical History:   Procedure Laterality Date    APPENDECTOMY      CHOLECYSTECTOMY      HYSTERECTOMY       Prior to Admission medications    Medication Sig Start Date End Date Taking? Authorizing Provider   acetaminophen (TYLENOL) 325 MG tablet Take 2 tablets by mouth Every 6 (Six) Hours As Needed for Mild Pain.   Yes Risa Lagunas MD   clonazePAM (KlonoPIN) 0.5 MG tablet TK 1/2 T PO TID PRN 2/2/18  Yes Risa Lagunas MD   omeprazole (priLOSEC) 40 MG capsule  5/1/18  Yes Risa Lagunas MD   dicyclomine (BENTYL) 10 MG capsule TAKE 1 CAPSULE BY MOUTH BEFORE MEAL(S) AND AT BEDTIME AS NEEDED 4/5/21   Risa Lagunas MD   DULoxetine (CYMBALTA) 30 MG capsule TK 1 C PO QD 6/16/20   Risa Lagunas MD   HYDROcodone-acetaminophen (NORCO) 5-325 MG per tablet Take one tablet by mouth every 8 to 12 hours prn pain 11/13/18   Ryan Chow MD   meloxicam (MOBIC) 7.5 MG tablet TAKE 1 TABLET BY MOUTH AT NIGHT AS NEEDED FOR MODERATE PAIN 4/15/19   Ryan Chow MD  "  oxyCODONE-acetaminophen (PERCOCET) 5-325 MG per tablet Take 1 tablet by mouth Every 8 (Eight) Hours As Needed for Moderate Pain  or Severe Pain . 10/16/18   Ryan Chow MD   phenazopyridine (PYRIDIUM) 200 MG tablet Take 1 tablet by mouth 2 (Two) Times a Day. 11/24/21   ProviderRisa MD     Allergies   Allergen Reactions    Acetaminophen GI Intolerance and Other (See Comments)     All pain medications hurts the patient stomach. Can only take tylenol - that sometimes upsets her stomach-  Other reaction(s): GI Intolerance  All pain medications hurts the patient stomach. Can only take tylenol - that sometimes upsets her stomach-  All pain medications hurts the patient stomach. Can only take tylenol - that sometimes upsets her stomach-  Other reaction(s): GI Intolerance  All pain medications hurts the patient stomach. Can only take tylenol - that sometimes upsets her stomach-      Aspirin GI Intolerance     Most Recent Immunizations   Administered Date(s) Administered    COVID-19 (MODERNA) 1st,2nd,3rd Dose Monovalent 07/30/2021    FLUAD TRI 65YR+ 12/03/2019    Fluzone High Dose =>65 Years ([employer] ONLY) 10/12/2018    Zostavax 11/03/2009     Social History     Tobacco Use    Smoking status: Never    Smokeless tobacco: Never   Substance Use Topics    Alcohol use: No      Social History     Substance and Sexual Activity   Drug Use Never       VITALS: /73 (BP Location: Left arm, Patient Position: Lying)   Pulse 70   Temp 97.6 °F (36.4 °C) (Oral)   Resp 16   Ht 157.5 cm (62\")   Wt 60.2 kg (132 lb 11.5 oz)   SpO2 100%   BMI 24.27 kg/m²  Body mass index is 24.27 kg/m².    PHYSICAL EXAM:   CONSTITUTIONAL: No acute distress  LUNGS: Equal chest rise, no shortness of air  CARDIOVASCULAR: palpable peripheral pulses  SKIN: no skin lesions in the area examined  LYMPH: no lymphadenopathy in the area examined  EXTREMITY: Right Lower Extremity  Tenderness to Palpation: Tenderness to palpation at the right " femur  Gross Deformity: Leg is shortened and externally rotated  Pulses:  Brisk Capillary Refill  Sensation: Intact to Saphenous, Sural, Deep Peroneal, Superficial Peroneal, and Tibial Nerves and grossly throughout extremity  Motor: 5/5 EHL/FHL/TA/GS motor complexes    RADIOLOGY REVIEW:   Mammo Screening Digital Tomosynthesis Bilateral With CAD    Result Date: 4/30/2024  FINAL IMPRESSION: ACR BI-RADS 2: Benign findings. RECOMMENDATIONS: Routine annual screening mammography. A letter including results and recommendations was sent to the patient. Density notification was provided to patients with type 3 or 4 breast tissue pattern. Patient information entered into a reminder system with a target due date for the next mammogram. At our facility, a [place] marker is positioned over a visible skin lesion and a linear marker is used to indicate a scar. A triangular marker is placed on a self reported palpable finding. Note: Mammography does not detect approximately 10-15% of breast cancers. An annual clinical breast exam by the patient's breast care physician and regular monthly self breast exams by the patient are integral parts of breast cancer screening, in addition to annual mammography. A normal mammogram does not completely exclude the presence of breast cancer, especially if there is an abnormal finding on physical exam. When clinically indicated, a biopsy should not be deferred because of a normal mammogram report. cc:     DXA bone density spine and hip    Result Date: 4/30/2024  1. Osteoporosis BMD of the lumbar spine. Elevated risk for fracture. 2. Osteoporosis BMD of the left femoral neck. Elevated risk for fracture. Images reviewed, interpreted, and dictated by Dr. Jose Bullock. Transcribed by Shin Musa PA-C.     LABS:   Results for the past 24 hours:   Recent Results (from the past 24 hour(s))   Comprehensive Metabolic Panel    Collection Time: 04/30/24  9:50 PM    Specimen: Blood   Result Value Ref Range     Glucose 161 (H) 65 - 99 mg/dL    BUN 15 8 - 23 mg/dL    Creatinine 0.56 (L) 0.57 - 1.00 mg/dL    Sodium 137 136 - 145 mmol/L    Potassium 3.0 (L) 3.5 - 5.2 mmol/L    Chloride 102 98 - 107 mmol/L    CO2 27.5 22.0 - 29.0 mmol/L    Calcium 9.1 8.6 - 10.5 mg/dL    Total Protein 5.7 (L) 6.0 - 8.5 g/dL    Albumin 3.9 3.5 - 5.2 g/dL    ALT (SGPT) 14 1 - 33 U/L    AST (SGOT) 20 1 - 32 U/L    Alkaline Phosphatase 69 39 - 117 U/L    Total Bilirubin 0.7 0.0 - 1.2 mg/dL    Globulin 1.8 gm/dL    A/G Ratio 2.2 g/dL    BUN/Creatinine Ratio 26.8 (H) 7.0 - 25.0    Anion Gap 7.5 5.0 - 15.0 mmol/L    eGFR 91.8 >60.0 mL/min/1.73   Magnesium    Collection Time: 04/30/24  9:50 PM    Specimen: Blood   Result Value Ref Range    Magnesium 1.8 1.6 - 2.4 mg/dL   Phosphorus    Collection Time: 04/30/24  9:50 PM    Specimen: Blood   Result Value Ref Range    Phosphorus 2.8 2.5 - 4.5 mg/dL   Protime-INR    Collection Time: 04/30/24  9:51 PM    Specimen: Blood   Result Value Ref Range    Protime 13.7 11.7 - 14.2 Seconds    INR 1.03 0.90 - 1.10   CBC (No Diff)    Collection Time: 04/30/24  9:51 PM    Specimen: Blood   Result Value Ref Range    WBC 11.05 (H) 3.40 - 10.80 10*3/mm3    RBC 3.80 3.77 - 5.28 10*6/mm3    Hemoglobin 11.7 (L) 12.0 - 15.9 g/dL    Hematocrit 34.3 34.0 - 46.6 %    MCV 90.3 79.0 - 97.0 fL    MCH 30.8 26.6 - 33.0 pg    MCHC 34.1 31.5 - 35.7 g/dL    RDW 12.9 12.3 - 15.4 %    RDW-SD 42.7 37.0 - 54.0 fl    MPV 9.7 6.0 - 12.0 fL    Platelets 238 140 - 450 10*3/mm3   CBC (No Diff)    Collection Time: 05/01/24  5:30 AM    Specimen: Blood   Result Value Ref Range    WBC 8.55 3.40 - 10.80 10*3/mm3    RBC 3.57 (L) 3.77 - 5.28 10*6/mm3    Hemoglobin 10.8 (L) 12.0 - 15.9 g/dL    Hematocrit 32.1 (L) 34.0 - 46.6 %    MCV 89.9 79.0 - 97.0 fL    MCH 30.3 26.6 - 33.0 pg    MCHC 33.6 31.5 - 35.7 g/dL    RDW 12.8 12.3 - 15.4 %    RDW-SD 42.6 37.0 - 54.0 fl    MPV 9.9 6.0 - 12.0 fL    Platelets 209 140 - 450 10*3/mm3       IMPRESSION:  Patient is  "a 83 y.o. Not  or  female with right distal femur supracondylar periprosthetic fracture    PLAN:   Admited to: Clay Sawyer MD  Disposition: Plan will be right femur retrograde nailing with or without open reduction and possible cabling.  Unfortunately, I was unable to get the patient on for surgery this morning.  I will likely not be available to get her surgery done until Friday morning and have already reached out to several of my partners who are quite capable of performing the surgery to see if anyone may be available tomorrow to do her surgery a little sooner.  For now we will keep her n.p.o. until I can rule out the chance that someone can do her surgery later this afternoon.  Surgical timing to be determined a little bit later.    R \"Jese\" Lavell SCHWARTZ MD  Orthopaedic Surgery  [facility]  [phone] - [place] Office  [phone] - [place] Office            "

## 2024-05-01 NOTE — ANESTHESIA PROCEDURE NOTES
Airway  Urgency: elective    Date/Time: 5/1/2024 5:19 PM    General Information and Staff    Patient location during procedure: OR  Anesthesiologist: Render, Julio Ray, MD    Indications and Patient Condition  Indications for airway management: airway protection    Preoxygenated: yes  MILS maintained throughout  Mask difficulty assessment: 1 - vent by mask    Final Airway Details  Final airway type: endotracheal airway      Successful airway: ETT     Successful intubation technique: direct laryngoscopy  Blade: Nino  Blade size: 3  ETT size (mm): 7.0  Cormack-Lehane Classification: grade I - full view of glottis  Placement verified by: chest auscultation and capnometry   Number of attempts at approach: 1

## 2024-05-01 NOTE — H&P
Patient Name:  Eve Fung  YOB: 1940  MRN:  [id number]  Admit Date:  4/30/2024  Patient Care Team:  Eve Carl APRN as PCP - General (Nurse Practitioner)      Subjective   History Present Illness     No chief complaint on file.          History of Present Illness  Patient is a 83-year-old female with a history of including but not limited to chronic back pain, GERD, anxiety, total right knee replacement, presented to the outside ED after mechanical fall complaining of right hip pain.  Was found to have distal right hip fracture.  Patient with history of total right knee replacement, and due to complicated surgery that patient will require, case was discussed with Dr. Monte who accepted the patient with plan for surgical intervention after admission. Patient was seen soon after arrival in the room.  Complaining of significant pain, 10 out of 10, was moaning and in significant discomfort.  Denies any other complaints.  No shortness of breath, chest pain, fevers or chills.  No history of heart disease per patient.    Review of Systems   GENERAL: No fevers, chills, sweats.    RESPIRATORY: No cough, shortness of breath, hemoptysis or wheezing.   CVS: No chest pain, palpitations, orthopnea, dyspnea on exertion   GI: No melena or hematochezia. No abdominal pain. No nausea, vomiting, constipation, diarrhea  MSK:  + right knee pain   Personal History     Past Medical History:   Diagnosis Date   • Osteoporosis      Past Surgical History:   Procedure Laterality Date   • APPENDECTOMY     • CHOLECYSTECTOMY     • HYSTERECTOMY       History reviewed. No pertinent family history.  Social History     Tobacco Use   • Smoking status: Never   • Smokeless tobacco: Never   Vaping Use   • Vaping status: Never Used   Substance Use Topics   • Alcohol use: No   • Drug use: Never     No current facility-administered medications on file prior to encounter.     Current Outpatient Medications on File Prior to  Encounter   Medication Sig Dispense Refill   • acetaminophen (TYLENOL) 325 MG tablet Take 2 tablets by mouth Every 6 (Six) Hours As Needed for Mild Pain.     • clonazePAM (KlonoPIN) 0.5 MG tablet TK 1/2 T PO TID PRN  0   • omeprazole (priLOSEC) 40 MG capsule      • dicyclomine (BENTYL) 10 MG capsule TAKE 1 CAPSULE BY MOUTH BEFORE MEAL(S) AND AT BEDTIME AS NEEDED     • DULoxetine (CYMBALTA) 30 MG capsule TK 1 C PO QD     • HYDROcodone-acetaminophen (NORCO) 5-325 MG per tablet Take one tablet by mouth every 8 to 12 hours prn pain 30 tablet 0   • meloxicam (MOBIC) 7.5 MG tablet TAKE 1 TABLET BY MOUTH AT NIGHT AS NEEDED FOR MODERATE PAIN 90 tablet 0   • oxyCODONE-acetaminophen (PERCOCET) 5-325 MG per tablet Take 1 tablet by mouth Every 8 (Eight) Hours As Needed for Moderate Pain  or Severe Pain . 40 tablet 0   • phenazopyridine (PYRIDIUM) 200 MG tablet Take 1 tablet by mouth 2 (Two) Times a Day.       Allergies   Allergen Reactions   • Acetaminophen GI Intolerance and Other (See Comments)     All pain medications hurts the patient stomach. Can only take tylenol - that sometimes upsets her stomach-  Other reaction(s): GI Intolerance  All pain medications hurts the patient stomach. Can only take tylenol - that sometimes upsets her stomach-  All pain medications hurts the patient stomach. Can only take tylenol - that sometimes upsets her stomach-  Other reaction(s): GI Intolerance  All pain medications hurts the patient stomach. Can only take tylenol - that sometimes upsets her stomach-     • Aspirin GI Intolerance       Objective    Objective     Vital Signs  Temp:  [97 °F (36.1 °C)] 97 °F (36.1 °C)  Heart Rate:  [71] 71  Resp:  [18] 18  BP: (134)/(70) 134/70  SpO2:  [94 %] 94 %  on   ;   Device (Oxygen Therapy): room air  Body mass index is 24.27 kg/m².    Physical Exam    General: Patient laying in bed, and significant discomfort secondary to pain  HEENT: Normocephalic, atraumatic  Eyes: PERRL, anicteric sclerae,  decreased hearing  Lungs: Clear to auscultation bilaterally, no crackles or wheezes  CV: Regular rate and rhythm, no murmurs rubs or gallops  Abdomen: Soft, nontender, nondistended.  Normoactive bowel sounds  Extremities: Right lower extremity externally rotated, hip tender to palpation.  Limited range of motion due to pain.  Skin: Clean/dry/intact, no rashes  Neuro: Cranial nerves II through XII intact, no gross focal neurological deficits appreciated  Psych: Appropriate mood and affect      Results Review:  I reviewed the patient's new clinical results.  I reviewed the patient's new imaging results and agree with the interpretation.  I reviewed the patient's other test results and agree with the interpretation  I personally viewed and interpreted the patient's EKG/Telemetry data  Discussed with ED provider.    Lab Results (last 24 hours)       ** No results found for the last 24 hours. **            Imaging Results (Last 24 Hours)       ** No results found for the last 24 hours. **                No orders to display        Assessment/Plan     Active Hospital Problems    Diagnosis  POA   • **Closed right hip fracture [S72.001A]  Yes   • GERD without esophagitis [K21.9]  Unknown   • Degeneration of lumbar intervertebral disc [M51.36]  Yes   • Anxiety [F41.9]  Yes   • Chronic back pain [M54.9, G89.29]  Yes   • S/P TKR (total knee replacement), right [Z96.651]  Not Applicable      Resolved Hospital Problems   No resolved problems to display.     Patient is a 83-year-old female with a history of including but not limited to chronic back pain, GERD, anxiety, total right knee replacement, presented to the outside ED after mechanical fall complaining of right hip pain.  Was found to have right hip fracture and was transferred to [facility] after discussion with orthopedic surgery service  ( Dr. Monte) due to requiring complicated surgery.    Right hip fracture  -Secondary to mechanical fall  -Patient is  significant pain, as needed IV Dilaudid 0.5 mg every 2 as needed for severe pain, as needed Percocet 7.5 mg every 4 hours for moderate pain.  Scheduled Bowel regimen in the setting of opioids   -Orthopedic surgery consult ( Dr Monte)      Anxiety  -Continue home as needed Klonopin      GERD  -On omeprazole outpatient,  -Initiate pantoprazole 40 mg daily    Hypokalemia  -Potassium 3.0, replace per protocol      Leukocytosis  -Mild, likely reactive secondary fracture.   -No signs of infection.  -Monitor      I discussed the patient's findings and my recommendations with patient and ED provider.  Discussed with patient, RN    VTE Prophylaxis - SCDs.  Code Status - Full code.       Clay Sawyer MD  [facility]  04/30/24  21:13 EDT

## 2024-05-02 LAB
ANION GAP SERPL CALCULATED.3IONS-SCNC: 7.6 MMOL/L (ref 5–15)
BASOPHILS # BLD AUTO: 0 10*3/MM3 (ref 0–0.2)
BASOPHILS NFR BLD AUTO: 0 % (ref 0–1.5)
BUN SERPL-MCNC: 11 MG/DL (ref 8–23)
BUN/CREAT SERPL: 28.9 (ref 7–25)
CALCIUM SPEC-SCNC: 8.1 MG/DL (ref 8.6–10.5)
CHLORIDE SERPL-SCNC: 102 MMOL/L (ref 98–107)
CO2 SERPL-SCNC: 25.4 MMOL/L (ref 22–29)
CREAT SERPL-MCNC: 0.38 MG/DL (ref 0.57–1)
DEPRECATED RDW RBC AUTO: 40 FL (ref 37–54)
EGFRCR SERPLBLD CKD-EPI 2021: 100.8 ML/MIN/1.73
EOSINOPHIL # BLD AUTO: 0 10*3/MM3 (ref 0–0.4)
EOSINOPHIL NFR BLD AUTO: 0 % (ref 0.3–6.2)
ERYTHROCYTE [DISTWIDTH] IN BLOOD BY AUTOMATED COUNT: 12.2 % (ref 12.3–15.4)
GLUCOSE SERPL-MCNC: 150 MG/DL (ref 65–99)
HCT VFR BLD AUTO: 25.3 % (ref 34–46.6)
HGB BLD-MCNC: 8.6 G/DL (ref 12–15.9)
IMM GRANULOCYTES # BLD AUTO: 0.04 10*3/MM3 (ref 0–0.05)
IMM GRANULOCYTES NFR BLD AUTO: 0.5 % (ref 0–0.5)
LYMPHOCYTES # BLD AUTO: 0.52 10*3/MM3 (ref 0.7–3.1)
LYMPHOCYTES NFR BLD AUTO: 5.9 % (ref 19.6–45.3)
MCH RBC QN AUTO: 30.3 PG (ref 26.6–33)
MCHC RBC AUTO-ENTMCNC: 34 G/DL (ref 31.5–35.7)
MCV RBC AUTO: 89.1 FL (ref 79–97)
MONOCYTES # BLD AUTO: 0.9 10*3/MM3 (ref 0.1–0.9)
MONOCYTES NFR BLD AUTO: 10.2 % (ref 5–12)
NEUTROPHILS NFR BLD AUTO: 7.33 10*3/MM3 (ref 1.7–7)
NEUTROPHILS NFR BLD AUTO: 83.4 % (ref 42.7–76)
NRBC BLD AUTO-RTO: 0 /100 WBC (ref 0–0.2)
PLATELET # BLD AUTO: 168 10*3/MM3 (ref 140–450)
PMV BLD AUTO: 10.2 FL (ref 6–12)
POTASSIUM SERPL-SCNC: 4 MMOL/L (ref 3.5–5.2)
RBC # BLD AUTO: 2.84 10*6/MM3 (ref 3.77–5.28)
SODIUM SERPL-SCNC: 135 MMOL/L (ref 136–145)
WBC NRBC COR # BLD AUTO: 8.79 10*3/MM3 (ref 3.4–10.8)

## 2024-05-02 PROCEDURE — 25810000003 SODIUM CHLORIDE 0.9 % SOLUTION: Performed by: STUDENT IN AN ORGANIZED HEALTH CARE EDUCATION/TRAINING PROGRAM

## 2024-05-02 PROCEDURE — 85025 COMPLETE CBC W/AUTO DIFF WBC: CPT | Performed by: INTERNAL MEDICINE

## 2024-05-02 PROCEDURE — 97535 SELF CARE MNGMENT TRAINING: CPT

## 2024-05-02 PROCEDURE — 80048 BASIC METABOLIC PNL TOTAL CA: CPT | Performed by: STUDENT IN AN ORGANIZED HEALTH CARE EDUCATION/TRAINING PROGRAM

## 2024-05-02 PROCEDURE — 25010000002 CEFAZOLIN PER 500 MG: Performed by: STUDENT IN AN ORGANIZED HEALTH CARE EDUCATION/TRAINING PROGRAM

## 2024-05-02 PROCEDURE — 97166 OT EVAL MOD COMPLEX 45 MIN: CPT

## 2024-05-02 PROCEDURE — 25010000002 HYDROMORPHONE PER 4 MG: Performed by: STUDENT IN AN ORGANIZED HEALTH CARE EDUCATION/TRAINING PROGRAM

## 2024-05-02 PROCEDURE — 97110 THERAPEUTIC EXERCISES: CPT | Performed by: PHYSICAL THERAPIST

## 2024-05-02 PROCEDURE — 97162 PT EVAL MOD COMPLEX 30 MIN: CPT | Performed by: PHYSICAL THERAPIST

## 2024-05-02 RX ADMIN — PANTOPRAZOLE SODIUM 40 MG: 40 TABLET, DELAYED RELEASE ORAL at 05:18

## 2024-05-02 RX ADMIN — RIVAROXABAN 10 MG: 10 TABLET, FILM COATED ORAL at 08:29

## 2024-05-02 RX ADMIN — OXYCODONE AND ACETAMINOPHEN 1 TABLET: 7.5; 325 TABLET ORAL at 01:48

## 2024-05-02 RX ADMIN — HYDROMORPHONE HYDROCHLORIDE 0.5 MG: 1 INJECTION, SOLUTION INTRAMUSCULAR; INTRAVENOUS; SUBCUTANEOUS at 05:18

## 2024-05-02 RX ADMIN — SODIUM CHLORIDE 75 ML/HR: 9 INJECTION, SOLUTION INTRAVENOUS at 13:58

## 2024-05-02 RX ADMIN — OXYCODONE AND ACETAMINOPHEN 1 TABLET: 7.5; 325 TABLET ORAL at 19:23

## 2024-05-02 RX ADMIN — OXYCODONE AND ACETAMINOPHEN 1 TABLET: 7.5; 325 TABLET ORAL at 10:48

## 2024-05-02 RX ADMIN — OXYCODONE AND ACETAMINOPHEN 1 TABLET: 7.5; 325 TABLET ORAL at 14:54

## 2024-05-02 RX ADMIN — OXYCODONE AND ACETAMINOPHEN 1 TABLET: 7.5; 325 TABLET ORAL at 06:24

## 2024-05-02 RX ADMIN — Medication 10 ML: at 08:40

## 2024-05-02 RX ADMIN — SODIUM CHLORIDE 2000 MG: 900 INJECTION INTRAVENOUS at 08:39

## 2024-05-02 RX ADMIN — DOCUSATE SODIUM 50MG AND SENNOSIDES 8.6MG 2 TABLET: 8.6; 5 TABLET, FILM COATED ORAL at 08:29

## 2024-05-02 NOTE — PLAN OF CARE
Goal Outcome Evaluation:           Progress: improving  Outcome Evaluation: vss, nvi, dressings c/d/i, EOB only with PT/OT, PW in place-voiding well, pain limiting ability to participate much, plan for SNF in a few days

## 2024-05-02 NOTE — OP NOTE
Orthopaedic & Sports Medicine Surgery  Dr. Luis Rivas MD  [phone]    Operative Note    PATIENT NAME: Eve Fung  MRN: [id number]  : 1940 AGE: 83 y.o. GENDER: female  DATE OF OPERATION: 2024  PREOPERATIVE DIAGNOSIS: Distal Femoral Fracture around a total knee replacement  POSTOPERATIVE DIAGNOSIS: Distal Femoral Fracture around total knee replacement  OPERATION PERFORMED:   Retrograde Intramedullary Nailing of Right Distal Femoral Fracture  Revision of right total knee replacement, polyethylene exchange  22 modifier -for multiple reasons the case was much more difficult than a standard retrograde femoral nail.  The case took over 100% longer for surgical time.  Patient had a total knee replacement at the be navigated around, we the positioning of the component we had to remove the polyethylene liner, and can get get an optimal starting point with the guidewire and entry reamer, having to adjust reduction multiple times.  The patient had extremely osteoporotic bone and a difficult fracture pattern being the case more difficult.  SURGEON: Luis Rivas MD  Circulator: Mary Ellen Gaona RN; Melissa Taylor RN  Radiology Technologist: Kika Dia  Scrub Person: Bryanna Lacey; Tricia Hernandez  Vendor Representative: Jayesh Kirkpatrick  ANESTHESIA: General  ASSISTANT: RADHA Swift an assistant was necessary and used to help with reduction, retracting, positioning,, holding reduction of the fracture, closing.  The case would have been extremely difficult without her skilled assistance  ESTIMATED BLOOD LOSS: 150cc  ANTIBIOTICS: Ancef  SPONGE AND NEEDLE COUNT: Correct  INDICATIONS: This patient was noted to have a distal femoral fracture. A discussion of operative versus nonoperative treatment was had with the patient. They elected to undergo intramedullary nailing of the distal femoral fracture. The risks of surgery were discussed and included the risk of anesthesia, infection, damage to neurovascular  structures, malunion/nonunion, the need for further procedures, medical complications, loss of range of motion, arthritis, and others. No guarantees were made of outcome. The patient wished to proceed with surgery and a surgical consent was signed.  COMPONENTS:   [employer] Buckland Nail: 11.5mm x  30 cm   Tri[employer] Buckland Nail 5.0 Distal Screws x 3  Tri[employer] Buckland Nail 5.0mm Proximal Interlocking Screws: X 1  [employer] persona polyethylene, right, 10 mm height medial congruent    DETAILS OF PROCEDURE:   The patient was met in the preoperative area. The site was marked. The consent and H&P were reviewed. The patient was then wheeled back to the operative suite underwent anesthesia.  There were placed in the supine position with all bony prominences well-padded.  Surgical alcohol was used to thoroughly clean the operative area.    The leg was then prepped in the normal sterile fashion, which included ChloraPrep and multiple layers of sterile drapes. The surgical incision was marked. A surgical timeout was performed in which administration of preoperative antibiotics and the surgical site were confirmed.    Incision was made through her old total knee incision anteriorly to the knee.  Dissection was carried down to the capsule and a medial parapatellar capsulotomy was used.     The dish polyethylene with this type of knee replacement did not allow for opening of space to get to the notch and the back of the total knee replacement.  We even Hyperflex the knee past 90 degrees and cannot even get the guidewire in place.  The locking mechanism on the total knee looks like it was likely damage as well.  We considered reaming through the front of this versus exchanging, but decided to exchange the component.  We are able to remove the polyethylene component.    The guidewire was then inserted and its position was verified on AP and lateral x-rays. The guidewire was passed into the distal femoral fragment.  Because of the shape  of the total knee replacement we cannot get the guidepin anterior enough for an optimal starting position, so it had to be more posterior than optimally desired.  We did use x-ray to confirm appropriate trajectory.  An opening reamer was then used while making sure to use a soft tissue protector to protect the tendon and cartilage.  A intramedullary reduction tool and then a ball-tipped guidewire was then passed all way up to the level of the proximal femur. Its position was verified on both AP and lateral x-rays. The length of the nail was then determined using the depth gauge while verifying its position on a lateral x-ray at the knee.    While we reamed the opening reamer and appropriate trajectory, due to the angle with a total knee replacement and her severe osteoporosis we cannot get perfect alignment of the fracture.  The distal segment did want to go into valgus.  We did attempt to use a clamp and for bruise to reduce the fracture but due to the severe osteoporotic bone a comminuted the distal portion on the lateral side would not reduce.  There is not great metaphysis distally to hold the nail.  So we manually held as best reduction as could be achieved while sequentially reaming.  We consider potentially opening and reducing, but the morbidity and mortality, and extended OR time, I do not believe would been beneficial with the patient's age and health.    Sequential reaming was then performed, making sure to over-ream for the diameter of the nail to be used, and using a soft tissue protector at the knee joint.  Confirmed that the fracture was reduced as best as possible while reaming.  The nail of corresponding diameter and length was then opened, secured to the jig, and inserted. Its passage through the fracture site was verified on fluoroscopy. The nail was inserted until it was noted to be past the notch of the knee and no longer intra-articular. Confirmation was also made at the hip that the implant  was not too long. After adequate AP and lateral x-rays confirmed its position, the nail was locked distally with interlocking screws. These were drilled, measured using a depth gauge, and inserted. Their position and length was verified by fluoroscopy.      Next the fracture site was again confirmed to be reduced.  Perfect circles were obtained proximally at the hip and one interlocking distal screw was drilled, measured, and inserted. Fluoroscopy showed the interlocking screw passed through the nail on an AP image and was of proper length on the lateral image.  The outrigger was removed.  A locking cap was then placed in the distal aspect of the nail.    Final x-rays demonstrated appropriate reduction of the fracture, appropriate length of the implant, and appropriate length of all interlocking screws.    The wounds were thoroughly irrigated.  We did flex the knee in place a polyethylene spacer back in the knee after confirming soft tissue removed from the tibial tray.  We confirmed that it did lock in to the tibial tray and was stable.  The knee was then irrigated again.  2 g of vancomycin powder were placed in the wound the capsulotomy was closed with 0 Vicryl sutures and a running #1 strata fix. Subcutaneous tissue was closed with 2-0 Vicryl's in each of the incision sites and the skin was closed with staples.    A sterile dressing was then placed over the incisions. The patient was moved to the [facility] and taken to the recovery room in stable condition. There were no complications and the patient tolerated the procedure well.  All counts were correct at the end of the procedure.  I was present and scrubbed for all portions of the case.    Post Operative Course:   Weight-bear as tolerated operative extremity  Chemical DVT prophylaxis to start tomorrow with Xarelto 10 mg daily unless otherwise desired by medicine team.    Luis Rivas MD  [facility]  Orthopaedic Surgery, Sports Medicine  [phone]  [phone]

## 2024-05-02 NOTE — ANESTHESIA POSTPROCEDURE EVALUATION
"Patient: Eve Fung    Procedure Summary       Date: 05/01/24 Room / Location: [facility]  / [facility] OR    Anesthesia Start: 1713 Anesthesia Stop: 2010    Procedure: RIGHT FEMUR INTRAMEDULLARY NAILING RETROGRADE AND RIGHT KNEE POLY LINER EXCHANGE (Right: Thigh) Diagnosis:       Other fracture of right femur, initial encounter for closed fracture      (Other fracture of right femur, initial encounter for closed fracture [S72.8X1A])    Surgeons: Luis Rivas MD Provider: Isaiah Shaffer MD    Anesthesia Type: general ASA Status: 3            Anesthesia Type: general    Vitals  Vitals Value Taken Time   /54 05/01/24 2200   Temp 36.9 °C (98.5 °F) 05/01/24 2010   Pulse 76 05/01/24 2207   Resp 12 05/01/24 2200   SpO2 100 % 05/01/24 2207   Vitals shown include unfiled device data.        Post Anesthesia Care and Evaluation    Patient location during evaluation: bedside  Patient participation: complete - patient participated  Level of consciousness: awake and alert  Pain management: adequate    Airway patency: patent  Anesthetic complications: No anesthetic complications  PONV Status: controlled  Cardiovascular status: acceptable and hemodynamically stable  Respiratory status: acceptable, spontaneous ventilation and nonlabored ventilation  Hydration status: acceptable    Comments: /54   Pulse 75   Temp 36.9 °C (98.5 °F) (Oral)   Resp 12   Ht 157.5 cm (62\")   Wt 60.2 kg (132 lb 11.5 oz)   SpO2 100%   BMI 24.27 kg/m²       "

## 2024-05-02 NOTE — PLAN OF CARE
Goal Outcome Evaluation:  Plan of Care Reviewed With: patient, son           Outcome Evaluation: Pt admitted from outside facility with femur fracture after fall. Pt is s/p R IM nail and knee poly liner exchange. She is WBAT. Pt presents with pain , limited ROM and limited activity tolerance secondary to pain. She required mod-max A to transition to sitting EOB. Poor sitting balance noted. Did not attempt standing secondary to limited tolerance of sitting EOB. Pt would benefit from PT to address her impairments. She lives with her son but was independently mobile prior to fall. Recommend rehab placement to further address mobiltiy and independence prior to returning home.      Anticipated Discharge Disposition (PT): skilled nursing facility, inpatient rehabilitation facility

## 2024-05-02 NOTE — PROGRESS NOTES
Name: Eve Fung ADMIT: 2024   : 1940  PCP: Siddhartha Eev RADHA PALOMINO    MRN: [id number] LOS: 2 days   AGE/SEX: 83 y.o. female  ROOM: [facility]     Subjective   Subjective   Patient is lying on the bed and does not appear to be any major distress.  Denies nausea, vomiting, abdominal pain, chest pain.       Objective   Objective   Vital Signs  Temp:  [97.5 °F (36.4 °C)-99.7 °F (37.6 °C)] 98.5 °F (36.9 °C)  Heart Rate:  [71-98] 71  Resp:  [12-18] 16  BP: (102-134)/(50-84) 102/64  SpO2:  [83 %-100 %] 98 %  on  Flow (L/min):  [2] 2;   Device (Oxygen Therapy): nasal cannula  Body mass index is 25.48 kg/m².  Physical Exam  HEENT: PERRLA, extraocular movements intact, Scleras no icterus  Neck: Supple, no JVD  Cardiovascular: Regular rate and rhythm with normal S1 and S2  Respiratory: Fairly clear to auscultation anteriorly with no wheezes  GI: Soft, nontender, bowel sounds are present  Extremities: No edema, palpable pulses, right leg is mildly reduced and externally rotated  Neurologic: Grossly nonfocal, no facial asymmetry    Results Review     I reviewed the patient's new clinical results.  Results from last 7 days   Lab Units 24  0454 24  0530 24  2151   WBC 10*3/mm3 8.79 8.55 11.05*   HEMOGLOBIN g/dL 8.6* 10.8* 11.7*   PLATELETS 10*3/mm3 168 209 238     Results from last 7 days   Lab Units 24  0454 24  0530 24  2150   SODIUM mmol/L 135* 136 137   POTASSIUM mmol/L 4.0 4.8 3.0*   CHLORIDE mmol/L 102 102 102   CO2 mmol/L 25.4 26.7 27.5   BUN mg/dL 11 14 15   CREATININE mg/dL 0.38* 0.48* 0.56*   GLUCOSE mg/dL 150* 120* 161*   EGFR mL/min/1.73 100.8 95.3 91.8     Results from last 7 days   Lab Units 24  2150   ALBUMIN g/dL 3.9   BILIRUBIN mg/dL 0.7   ALK PHOS U/L 69   AST (SGOT) U/L 20   ALT (SGPT) U/L 14     Results from last 7 days   Lab Units 24  0454 24  0530 24  2150   CALCIUM mg/dL 8.1* 8.8 9.1   ALBUMIN g/dL  --   --  3.9   MAGNESIUM mg/dL   "--  2.0 1.8   PHOSPHORUS mg/dL  --  3.3 2.8       No results found for: \"HGBA1C\", \"POCGLU\"    XR Femur 2 View Right    Result Date: 5/1/2024  Postoperative findings, as noted above.  This report was finalized on 5/1/2024 10:36 PM by Dr. Uzma Clay M.D on Workstation: [id number]       I have personally reviewed all medications:  Scheduled Medications  pantoprazole, 40 mg, Oral, Q AM  rivaroxaban, 10 mg, Oral, Daily With Breakfast  senna-docusate sodium, 2 tablet, Oral, BID  sodium chloride, 10 mL, Intravenous, Q12H    Infusions  lactated ringers, 9 mL/hr, Last Rate: 9 mL/hr (05/01/24 2100)  sodium chloride, 75 mL/hr, Last Rate: 75 mL/hr (05/02/24 1358)    Diet  Diet: Regular/House; Fluid Consistency: Thin (IDDSI 0)    I have personally reviewed:  [x]  Laboratory   [x]  Microbiology   [x]  Radiology   [x]  EKG/Telemetry  [x]  Cardiology/Vascular   []  Pathology    []  Records       Assessment/Plan     Active Hospital Problems    Diagnosis  POA    **Closed right hip fracture [S72.001A]  Yes    GERD without esophagitis [K21.9]  Unknown    Other fracture of right femur, initial encounter for closed fracture [S72.8X1A]  Unknown    Degeneration of lumbar intervertebral disc [M51.36]  Yes    Anxiety [F41.9]  Yes    Chronic back pain [M54.9, G89.29]  Yes    S/P TKR (total knee replacement), right [Z96.651]  Not Applicable      Resolved Hospital Problems   No resolved problems to display.     1.Right femur fracture, underwent ORIF and today's postop day #1.  Continue with analgesics and physical therapy.  I encouraged her to use incentive spirometry.    2.  GERD, on acid suppressive therapy.    3.  Hypokalemia, replaced and potassium is 4 today.    4.  Reactive leukocytosis, WBC is back to normal.  She denies any dysuria and no reports of any cough or sputum production.    5.  Anemia, likely acute blood loss postop, hemoglobin has dropped from 10.8-->8.6 and will continue to monitor closely.    6.  On SCDs for DVT " prophylaxis.    7.  Estimated discharge date, to rehab once a bed is available.        Jesse Oakley MD  [facility]  05/02/24  15:08 EDT

## 2024-05-02 NOTE — PLAN OF CARE
Goal Outcome Evaluation:              Outcome Evaluation: Pt is an 83-year-old female with past medical history significant for osteoarthritis, carpal tunnel syndrome of left wrist, bursitis and hip, degeneration of lumbar intervertebral disc, GERD. Patient seen by OT this AM POD #1 s/p right femur retrograde intramedullary nailing of right distal femoral fracture with revision of right total knee polyethylene exchange after fall at home in bathroom. Patient fowlers in bed on OT arrival and agreeable to evaluation this AM with 2 sons present at bedside.Pt lives at home with one of her son's in an apartment behind other son's house and IND with ADLs with no use of AE/AD at baseline. Pt presents to OT with significant pain in RLE which is limiting fxl mobility at this time. Pt crying out in pain with all fxl movement this AM and unable to tolerate sitting EOB without OT holding RLE up and off of floor. Pt requesting back to bed and OT, RN and pt's son assist sit to supine and repositioning in bed. Skilled OT services are medically indicated to maximize pt's safety and IND with ADLs and fxl mobility and attain PLOF. OT recommending rehab at [place].      Anticipated Discharge Disposition (OT): skilled nursing facility, inpatient rehabilitation facility

## 2024-05-02 NOTE — BRIEF OP NOTE
FEMUR INTRAMEDULLARY NAILING RETROGRADE  Progress Note    Eve Fung  5/1/2024    Pre-op Diagnosis:   Other fracture of right femur, initial encounter for closed fracture [S72.8X1A]       Post-Op Diagnosis Codes:     * Other fracture of right femur, initial encounter for closed fracture [S72.8X1A]    Procedure/CPT® Codes:        Procedure(s):  RIGHT FEMUR INTRAMEDULLARY NAILING RETROGRADE AND RIGHT KNEE POLY LINER EXCHANGE              Surgeon(s):  Luis Rivas MD    Anesthesia: General    Staff:   Circulator: Mary Ellen Gaona RN; Melissa Taylor RN  Radiology Technologist: Kika Dia  Scrub Person: Bryanna Lacey; Tricia Hernandez  Vendor Representative: Jayesh Kirkpatrick         Estimated Blood Loss:  150 cc    Urine Voided: * No values recorded between 5/1/2024  5:13 PM and 5/1/2024  8:05 PM *    Specimens:                None          Drains:   [REMOVED] External Urinary Catheter (Removed)   Daily Indications Strict bedrest 05/01/24 0829   Site Assessment Clean;Skin intact 05/01/24 0829   Application/Removal external catheter applied 04/30/24 2036   Collection Container Wall suction 05/01/24 0829   Wall suction (mmHG) 120 mmHG 05/01/24 0829   Securement Method Securing device 05/01/24 0829   Output (mL) 200 mL 05/01/24 0939       Findings: See dictated op report        Complications: None apparent at time of dictation    RADHA Swift  was responsible for performing the following activities: Retraction, Suction, Irrigation, Suturing, Closing, and Placing Dressing and their skilled assistance was necessary for the success of this case.    Luis Rivas MD     Date: 5/1/2024  Time: 20:12 EDT

## 2024-05-02 NOTE — PLAN OF CARE
Problem: Adult Inpatient Plan of Care  Goal: Plan of Care Review  Recent Flowsheet Documentation  Taken 5/2/2024 0646 by Eve Rodriguez, RN  Outcome Evaluation: Received from PACU. Alert . [place]. Incision dressings hip and knee intact, swelling and bruises noted. Pulses present, no c/o numbness. Percocet and IV Dilaudid given for pain. IV Fluids on flow . Turned to sides. Urine output adequate via external catheter-discontinued this morning.  IV Fluids on flow, IV Cefazolin given, Max Temp 99.7'F DB. coughing and IS use encouraged. Regular diet tolerated. Slept in between care. 2 Sons at bedside.  Taken 5/2/2024 0645 by Eve Rodriguez, RN  Progress: improving  Plan of Care Reviewed With: patient

## 2024-05-02 NOTE — THERAPY EVALUATION
Patient Name: Eve Fung  : 1940    MRN: [id number]                              Today's Date: 2024       Admit Date: 2024    Visit Dx:     ICD-10-CM ICD-9-CM   1. Other fracture of right femur, initial encounter for closed fracture  S72.8X1A 821.00     Patient Active Problem List   Diagnosis    Osteoarthritis of knees, bilateral    Carpal tunnel syndrome of left wrist    Pain of right hip joint    S/P TKR (total knee replacement), right    Arthritis of carpometacarpal (CMC) joint of right thumb    Pain in limb    Degeneration of lumbar intervertebral disc    Chronic back pain    Anxiety    Osteoarthritis of carpometacarpal (CMC) joint of left thumb    Greater trochanteric bursitis of right hip    Bilateral wrist pain    Primary osteoarthritis of first carpometacarpal joint of right hand    Greater trochanteric bursitis of left hip    Closed right hip fracture    GERD without esophagitis    Other fracture of right femur, initial encounter for closed fracture     Past Medical History:   Diagnosis Date    Osteoporosis      Past Surgical History:   Procedure Laterality Date    APPENDECTOMY      CHOLECYSTECTOMY      FEMUR IM NAILING RETROGRADE Right 2024    Procedure: RIGHT FEMUR INTRAMEDULLARY NAILING RETROGRADE AND RIGHT KNEE POLY LINER EXCHANGE;  Surgeon: Luis Rivas MD;  Location: [facility];  Service: Orthopedics;  Laterality: Right;    HYSTERECTOMY        General Information       Row Name 24 1217          OT Time and Intention    Document Type evaluation  -KATUISKA     Mode of Treatment occupational therapy;individual therapy  -KATIUSKA       Row Name 24 1217          General Information    Patient Profile Reviewed yes  -KATIUSKA     Prior Level of Function independent:;ADL's;all household mobility  Denies use of AE/AD at baseline  -KATIUSKA     Existing Precautions/Restrictions fall;oxygen therapy device and L/min;other (see comments);right;hip;weight bearing  RLE WBAT  -KATIUSKA     Barriers to  Rehab hearing deficit  [place] with hearing aids not present at this time.  Patient reports left at home.  History of L TKA and osteoporosis  -KATIUSKA       Row Name 05/02/24 1217          Living Environment    People in Home child(nadine), adult;other (see comments)  Patient reports lives with son in an apartment behind other son's house  -KATIUSKA       Row Name 05/02/24 1217          Cognition    Orientation Status (Cognition) oriented x 4  -KATIUSKA       Row Name 05/02/24 1217          Safety Issues, Functional Mobility    Impairments Affecting Function (Mobility) endurance/activity tolerance;shortness of breath;pain;range of motion (ROM);strength;balance;other (see comments)  -KATIUSKA     Comment, Safety Issues/Impairments (Mobility) --  -KATIUSKA               User Key  (r) = Recorded By, (t) = Taken By, (c) = Cosigned By      Initials Name Provider Type    Ada Dai OT Occupational Therapist                     Mobility/ADL's       Row Name 05/02/24 1243          Bed Mobility    Bed Mobility scooting/bridging;supine-sit;sit-supine  -KATIUSKA     Scooting/Bridging [place] (Bed Mobility) verbal cues;nonverbal cues (demo/gesture);contact guard  -KATIUSKA     Supine-Sit [place] (Bed Mobility) moderate assist (50% patient effort);verbal cues;nonverbal cues (demo/gesture);2 person assist  -KATIUSKA     Sit-Supine [place] (Bed Mobility) maximum assist (25% patient effort);2 person assist;verbal cues;nonverbal cues (demo/gesture)  -KATIUSKA     Bed Mobility, Safety Issues decreased use of legs for bridging/pushing  -KATIUSKA     Comment, (Bed Mobility) Inc time and pt crying out in pain with LLE movement. OT assisted with RLE mobility to sit EOB and pt unable to tolerate placing RLE down sitting EOB and OT holding RLE off of ground while pt sitting EOB. Pt limited by pain at this time and unable to tolerate further tfers and fxl tasks at this time.  -KATIUSKA       Row Name 05/02/24 1243          Transfers    Comment, (Transfers) Deferred; pt limited by  significant pain in RLE at this time and unable to tolerate further mobility/txfers this AM.  -KATIUSKA       Row Name 05/02/24 1243          Bed-Chair Transfer    Bed-Chair [place] (Transfers) not tested;unable to assess  -KATIUSKA       Row Name 05/02/24 1243          Sit-Stand Transfer    Sit-Stand [place] (Transfers) not tested;unable to assess  -KATIUSKA       Row Name 05/02/24 1243          Functional Mobility    Functional Mobility- Ind. Level not tested;unable to perform  -KATIUSKA       Row Name 05/02/24 1243          Activities of Daily Living    BADL Assessment/Intervention upper body dressing;grooming;toileting  -KATIUSKA       Row Name 05/02/24 1243          Mobility    Extremity Weight-bearing Status right lower extremity  -KATIUSKA     Left Lower Extremity (Weight-bearing Status) weight-bearing as tolerated (WBAT)  -KATIUSKA       Row Name 05/02/24 1243          Upper Body Dressing Assessment/Training    [place] Level (Upper Body Dressing) maximum assist (25% patient effort);upper body dressing skills;front opening garment;don  -KATIUSKA     Comment, (Upper Body Dressing) Adjusted hospital gown sitting up in bed and again sitting EOB.  -KATIUSKA       Row Name 05/02/24 1243          Grooming Assessment/Training    [place] Level (Grooming) grooming skills;wash face, hands;set up  -KATIUSKA     Position (Grooming) edge of bed sitting  -KATIUSKA       Row Name 05/02/24 1243          Toileting Assessment/Training    [place] Level (Toileting) toileting skills;adjust/manage clothing;bridging;change pad/brief;maximum assist (25% patient effort);dependent (less than 25% patient effort);verbal cues;nonverbal cues (demo/gesture)  -KATIUSKA     Position (Toileting) supine  -KATIUSKA     Comment, (Toileting) Pt in wet brief on OT arrival this AM and OT and RN assist with brief change and clean up with pt in supine in bed. Pt encouraged to cont to call for staff assist with changing brief and OT placed BSC in pt's room for use once pt's pain is controlled and she is  able to safely txfer from EOB to BSC with assistance from staff. Pt able to use RLE to bridge up during brief change while in supine.  -KATIUSKA               User Key  (r) = Recorded By, (t) = Taken By, (c) = Cosigned By      Initials Name Provider Type    Ada Dai OT Occupational Therapist                   Obj/Interventions       Row Name 05/02/24 1756          Vision Assessment/Intervention    Visual Impairment/Limitations WFL;corrective lenses for reading  -KATIUSKA       Row Name 05/02/24 1756          Range of Motion Comprehensive    Comment, General Range of Motion pt limited by pain in RLE; BUE AROM WFL for fxl tasks this AM.  -KATIUSKA       Row Name 05/02/24 1756          Strength Comprehensive (MMT)    Comment, General Manual Muscle Testing (MMT) Assessment Generalized weakness and limited by pain this AM; BUE grossly 3/5 to 3+/5 MMT  -KATIUSKA       Row Name 05/02/24 1756          Motor Skills    Motor Skills functional endurance  -KATIUSKA     Functional Endurance Poor; reports feeling light headed and dizzy with inc pain limiting participation this date.  -KATIUSKA     Therapeutic Exercise other (see comments)  Pt educated on B shoulder shrugs, ankle pumps and wrist flicks to inc circulation and encouraged to continue freq  throughout day  -KATIUSKA       Row Name 05/02/24 1756          Balance    Balance Assessment other (see comments)  -KATIUSKA     Balance Interventions other (see comments)  -KATIUSKA     Comment, Balance Posterior and L lateral leaning noted in sitting and pt leaning forward and side to side in pain during mobility this AM.  -KATIUSKA               User Key  (r) = Recorded By, (t) = Taken By, (c) = Cosigned By      Initials Name Provider Type    Ada Dai OT Occupational Therapist                   Goals/Plan       Row Name 05/02/24 1814          Bed Mobility Goal 1 (OT)    Activity/Assistive Device (Bed Mobility Goal 1, OT) bed mobility activities, all  -KATIUSKA     [place] Level/Cues Needed (Bed Mobility Goal 1, OT)  verbal cues required;nonverbal cues (demo/gesture) required;minimum assist (75% or more patient effort)  -KATIUSKA     Time Frame (Bed Mobility Goal 1, OT) short term goal (STG);2 weeks  -KATIUSKA     Progress/Outcomes (Bed Mobility Goal 1, OT) new goal  -KATIUSKA       Row Name 05/02/24 1814          Transfer Goal 1 (OT)    Activity/Assistive Device (Transfer Goal 1, OT) transfers, all;walker, rolling  -KATIUSKA     [place] Level/Cues Needed (Transfer Goal 1, OT) verbal cues required;nonverbal cues (demo/gesture) required;moderate assist (50-74% patient effort)  -KATIUSKA     Time Frame (Transfer Goal 1, OT) short term goal (STG);2 weeks  -KATIUSKA     Progress/Outcome (Transfer Goal 1, OT) new goal  -KATIUSKA       Row Name 05/02/24 1814          Dressing Goal 1 (OT)    Activity/Device (Dressing Goal 1, OT) dressing skills, all;upper body dressing;lower body dressing;other (see comments)  using AE as needed  -KATIUSKA     [place]/Cues Needed (Dressing Goal 1, OT) set-up required;verbal cues required;nonverbal cues (demo/gesture) required;contact guard required  -KATIUSKA     Time Frame (Dressing Goal 1, OT) short term goal (STG);2 weeks  -KATIUSKA     Progress/Outcome (Dressing Goal 1, OT) new goal  -KATIUSKA       Row Name 05/02/24 1814          Toileting Goal 1 (OT)    Activity/Device (Toileting Goal 1, OT) toileting skills, all;grab bar/safety frame;raised toilet seat;commode chair  -KATIUSKA     [place] Level/Cues Needed (Toileting Goal 1, OT) verbal cues required;set-up required;contact guard required;nonverbal cues (demo/gesture) required  -KATIUSKA     Time Frame (Toileting Goal 1, OT) short term goal (STG);2 weeks  -KATIUSKA     Progress/Outcome (Toileting Goal 1, OT) new goal  -KATIUSKA       Row Name 05/02/24 1814          Grooming Goal 1 (OT)    Activity/Device (Grooming Goal 1, OT) grooming skills, all  -KATIUSKA     [place] (Grooming Goal 1, OT) verbal cues required;nonverbal cues (demo/gesture) required;set-up required;contact guard required;other (see comments)  standing or  sitting sink side  -KATIUSKA     Time Frame (Grooming Goal 1, OT) short term goal (STG);2 weeks  -KATIUSKA     Progress/Outcome (Grooming Goal 1, OT) new goal  -KATIUSKA       Row Name 05/02/24 1814          Therapy Assessment/Plan (OT)    Planned Therapy Interventions (OT) activity tolerance training;BADL retraining;functional balance retraining;occupation/activity based interventions;patient/caregiver education/training;strengthening exercise;transfer/mobility retraining;adaptive equipment training;ROM/therapeutic exercise;edema control/reduction;passive ROM/stretching  -KATIUSKA               User Key  (r) = Recorded By, (t) = Taken By, (c) = Cosigned By      Initials Name Provider Type    Ada Dai OT Occupational Therapist                   Clinical Impression       Row Name 05/02/24 1806          Pain Assessment    Pretreatment Pain Rating 8/10  -KATIUSKA     Posttreatment Pain Rating 8/10  -KATIUSKA     Pain Location - Side/Orientation Right  -KATIUSKA     Pain Location lower  -KATIUSKA     Pain Location - extremity  -KATIUSKA       Row Name 05/02/24 1800          Plan of Care Review    Outcome Evaluation Pt is an 83-year-old female with past medical history significant for osteoarthritis, carpal tunnel syndrome of left wrist, bursitis and hip, degeneration of lumbar intervertebral disc, GERD. Patient seen by OT this AM POD #1 s/p right femur retrograde intramedullary nailing of right distal femoral fracture with revision of right total knee polyethylene exchange after fall at home in bathroom. Patient fowlers in bed on OT arrival and agreeable to evaluation this AM with 2 sons present at bedside.Pt lives at home with one of her son's in an apartment behind other son's house and IND with ADLs with no use of AE/AD at baseline. Pt presents to OT with significant pain in RLE which is limiting fxl mobility at this time. Pt crying out in pain with all fxl movement this AM and unable to tolerate sitting EOB without OT holding RLE up and off of floor. Pt  requesting back to bed and OT, RN and pt's son assist sit to supine and repositioning in bed. Skilled OT services are medically indicated to maximize pt's safety and IND with ADLs and fxl mobility and attain PLOF. OT recommending rehab at [place].  -KATIUSKA       Row Name 05/02/24 1806          Therapy Assessment/Plan (OT)    Rehab Potential (OT) fair, will monitor progress closely  -KATIUSKA     Criteria for Skilled Therapeutic Interventions Met (OT) yes;skilled treatment is necessary  -KATIUSKA     Therapy Frequency (OT) 5 times/wk  -KATIUSKA       Row Name 05/02/24 1806          Therapy Plan Review/Discharge Plan (OT)    Anticipated Discharge Disposition (OT) skilled nursing facility;inpatient rehabilitation facility  -KATIUSKA       Row Name 05/02/24 1806          Vital Signs    O2 Delivery Pre Treatment nasal cannula  -KATIUSKA     O2 Delivery Intra Treatment nasal cannula  -KATIUSKA     O2 Delivery Post Treatment nasal cannula  -KATIUSKA       Row Name 05/02/24 1806          Positioning and Restraints    Pre-Treatment Position in bed  -KATIUSKA     Post Treatment Position bed  -KATIUSKA     In Bed notified nsg;fowlers;call light within reach;encouraged to call for assist;exit alarm on;with family/caregiver;SCD pump applied;RLE elevated  -KATIUSKA               User Key  (r) = Recorded By, (t) = Taken By, (c) = Cosigned By      Initials Name Provider Type    Ada Dai, SOO Occupational Therapist                   Outcome Measures       Row Name 05/02/24 1821          How much help from another is currently needed...    Putting on and taking off regular lower body clothing? 2  -KATIUSKA     Bathing (including washing, rinsing, and drying) 2  -KATIUSKA     Toileting (which includes using toilet bed pan or urinal) 1  -KATIUSKA     Putting on and taking off regular upper body clothing 2  -KATIUSKA     Taking care of personal grooming (such as brushing teeth) 3  -KATIUSKA     Eating meals 3  -KATIUSKA     AM-PAC 6 Clicks Score (OT) 13  -KATIUSKA       Row Name 05/02/24 1442 05/02/24 1321       How much help from another  person do you currently need...    Turning from your back to your side while in flat bed without using bedrails? 2  -MN 2  -KH    Moving from lying on back to sitting on the side of a flat bed without bedrails? 2  -MN 2  -KH    Moving to and from a bed to a chair (including a wheelchair)? 2  -MN 2  -KH    Standing up from a chair using your arms (e.g., wheelchair, bedside chair)? 1  -MN 1  -KH    Climbing 3-5 steps with a railing? 1  -MN 1  -KH    To walk in hospital room? 1  -MN 1  -KH    AM-PAC 6 Clicks Score (PT) 9  -MN 9  -KH    Highest Level of Mobility Goal 3 --> Sit at edge of bed  -MN 3 --> Sit at edge of bed  -KH      Row Name 05/02/24 0829          How much help from another person do you currently need...    Turning from your back to your side while in flat bed without using bedrails? 2  -VL     Moving from lying on back to sitting on the side of a flat bed without bedrails? 2  -VL     Moving to and from a bed to a chair (including a wheelchair)? 2  -VL     Standing up from a chair using your arms (e.g., wheelchair, bedside chair)? 2  -VL     Climbing 3-5 steps with a railing? 1  -VL     To walk in hospital room? 1  -VL     AM-PAC 6 Clicks Score (PT) 10  -VL     Highest Level of Mobility Goal 4 --> Transfer to chair/commode  -VL       Row Name 05/02/24 1821          Functional Assessment    Outcome Measure Options AM-PAC 6 Clicks Daily Activity (OT)  -KATIUSKA               User Key  (r) = Recorded By, (t) = Taken By, (c) = Cosigned By      Initials Name Provider Type    Misty Wolf, PT Physical Therapist    Guadalupe Vicente, RN Registered Nurse    Nicole Grace RN Registered Nurse    Ada Dai OT Occupational Therapist                    Occupational Therapy Education       Title: PT OT SLP Therapies (In Progress)       Topic: Occupational Therapy (Done)       Point: ADL training (Done)       Description:   Instruct learner(s) on proper safety adaptation and remediation  techniques during self care or transfers.   Instruct in proper use of assistive devices.                  Learning Progress Summary             Patient Acceptance, E,D, VU by KATIUSKA at 5/2/2024 1821    Comment: Role of OT and safety and ADL techs   Family Acceptance, E,D, VU by KATIUSKA at 5/2/2024 1821    Comment: Role of OT and safety and ADL techs                         Point: Home exercise program (Done)       Description:   Instruct learner(s) on appropriate technique for monitoring, assisting and/or progressing therapeutic exercises/activities.                  Learning Progress Summary             Patient Acceptance, E,D, VU by KATIUSKA at 5/2/2024 1821    Comment: Role of OT and safety and ADL techs   Family Acceptance, E,D, VU by KATIUSKA at 5/2/2024 1821    Comment: Role of OT and safety and ADL techs                         Point: Precautions (Done)       Description:   Instruct learner(s) on prescribed precautions during self-care and functional transfers.                  Learning Progress Summary             Patient Acceptance, E,D, VU by KATIUSKA at 5/2/2024 1821    Comment: Role of OT and safety and ADL techs   Family Acceptance, E,D, VU by KATIUSKA at 5/2/2024 1821    Comment: Role of OT and safety and ADL techs                         Point: Body mechanics (Done)       Description:   Instruct learner(s) on proper positioning and spine alignment during self-care, functional mobility activities and/or exercises.                  Learning Progress Summary             Patient Acceptance, E,D, VU by KATIUSKA at 5/2/2024 1821    Comment: Role of OT and safety and ADL techs   Family Acceptance, E,D, VU by KATIUSKA at 5/2/2024 1821    Comment: Role of OT and safety and ADL techs                                         User Key       Initials Effective Dates Name Provider Type Discipline    KATIUSKA 10/12/23 -  Ada Evans OT Occupational Therapist OT                  OT Recommendation and Plan  Planned Therapy Interventions (OT): activity tolerance  training, BADL retraining, functional balance retraining, occupation/activity based interventions, patient/caregiver education/training, strengthening exercise, transfer/mobility retraining, adaptive equipment training, ROM/therapeutic exercise, edema control/reduction, passive ROM/stretching  Therapy Frequency (OT): 5 times/wk  Plan of Care Review  Outcome Evaluation: Pt is an 83-year-old female with past medical history significant for osteoarthritis, carpal tunnel syndrome of left wrist, bursitis and hip, degeneration of lumbar intervertebral disc, GERD. Patient seen by OT this AM POD #1 s/p right femur retrograde intramedullary nailing of right distal femoral fracture with revision of right total knee polyethylene exchange after fall at home in bathroom. Patient fowlers in bed on OT arrival and agreeable to evaluation this AM with 2 sons present at bedside.Pt lives at home with one of her son's in an apartment behind other son's house and IND with ADLs with no use of AE/AD at baseline. Pt presents to OT with significant pain in RLE which is limiting fxl mobility at this time. Pt crying out in pain with all fxl movement this AM and unable to tolerate sitting EOB without OT holding RLE up and off of floor. Pt requesting back to bed and OT, RN and pt's son assist sit to supine and repositioning in bed. Skilled OT services are medically indicated to maximize pt's safety and IND with ADLs and fxl mobility and attain PLOF. OT recommending rehab at [place].     Time Calculation:   Evaluation Complexity (OT)  Review Occupational Profile/Medical/Therapy History Complexity: brief/low complexity  Assessment, Occupational Performance/Identification of Deficit Complexity: 3-5 performance deficits  Clinical Decision Making Complexity (OT): detailed assessment/moderate complexity  Overall Complexity of Evaluation (OT): moderate complexity     Time Calculation- OT       Row Name 05/02/24 0210             Time Calculation- OT    OT  Start Time 1030  -KATIUSKA      OT Stop Time 1120  -KATIUSKA      OT Time Calculation (min) 50 min  -KATIUSKA      Total Timed Code Minutes- OT 23 minute(s)  -KATIUSKA      OT Received On 05/02/24  -KATIUSKA      OT - Next Appointment 05/03/24  -KATIUSKA      OT Goal Re-Cert Due Date 05/16/24  -KATIUSKA         Timed Charges    65755 - OT Self Care/Mgmt Minutes 23  -KATIUSKA         Untimed Charges    OT Eval/Re-eval Minutes 27  -KATIUSKA         Total Minutes    Timed Charges Total Minutes 23  -KATIUSKA      Untimed Charges Total Minutes 27  -KATIUSKA       Total Minutes 50  -KATIUSKA                User Key  (r) = Recorded By, (t) = Taken By, (c) = Cosigned By      Initials Name Provider Type    Ada Dai OT Occupational Therapist                  Therapy Charges for Today       Code Description Service Date Service Provider Modifiers Qty    65057766406 HC OT SELF CARE/MGMT/TRAIN EA 15 MIN 5/2/2024 Ada Evans OT GO 2    27829389405 HC OT EVAL MOD COMPLEXITY 3 5/2/2024 Ada Evans OT GO 1                 Ada Evans OT  5/2/2024

## 2024-05-02 NOTE — SIGNIFICANT NOTE
05/02/24 1138   OTHER   Discipline physical therapist   Rehab Time/Intention   Session Not Performed patient/family declined evaluation  (Just worked with OT and in a lot of pain. Will follow up later today.)

## 2024-05-02 NOTE — THERAPY EVALUATION
Patient Name: Eve Fung  : 1940    MRN: [id number]                              Today's Date: 2024       Admit Date: 2024    Visit Dx:     ICD-10-CM ICD-9-CM   1. Other fracture of right femur, initial encounter for closed fracture  S72.8X1A 821.00     Patient Active Problem List   Diagnosis    Osteoarthritis of knees, bilateral    Carpal tunnel syndrome of left wrist    Pain of right hip joint    S/P TKR (total knee replacement), right    Arthritis of carpometacarpal (CMC) joint of right thumb    Pain in limb    Degeneration of lumbar intervertebral disc    Chronic back pain    Anxiety    Osteoarthritis of carpometacarpal (CMC) joint of left thumb    Greater trochanteric bursitis of right hip    Bilateral wrist pain    Primary osteoarthritis of first carpometacarpal joint of right hand    Greater trochanteric bursitis of left hip    Closed right hip fracture    GERD without esophagitis    Other fracture of right femur, initial encounter for closed fracture     Past Medical History:   Diagnosis Date    Osteoporosis      Past Surgical History:   Procedure Laterality Date    APPENDECTOMY      CHOLECYSTECTOMY      FEMUR IM NAILING RETROGRADE Right 2024    Procedure: RIGHT FEMUR INTRAMEDULLARY NAILING RETROGRADE AND RIGHT KNEE POLY LINER EXCHANGE;  Surgeon: Luis Rivas MD;  Location: [facility];  Service: Orthopedics;  Laterality: Right;    HYSTERECTOMY        General Information       Row Name 24 1314          Physical Therapy Time and Intention    Document Type evaluation  -     Mode of Treatment individual therapy;physical therapy  -       Row Name 24 1314          General Information    Patient Profile Reviewed yes  -     Prior Level of Function independent:  -     Existing Precautions/Restrictions fall;oxygen therapy device and L/min;left;other (see comments)  -     Barriers to Rehab hearing deficit  -       Row Name 24 1314          Living Environment     People in Home child(nadine), adult  -[facility] Name 05/02/24 1314          Cognition    Orientation Status (Cognition) oriented x 4  -[facility] Name 05/02/24 1314          Safety Issues, Functional Mobility    Impairments Affecting Function (Mobility) endurance/activity tolerance;shortness of breath;pain;range of motion (ROM);strength;balance;other (see comments)  -               User Key  (r) = Recorded By, (t) = Taken By, (c) = Cosigned By      Initials Name Provider Type    Misty Wolf, PT Physical Therapist                   Mobility       [facility] Name 05/02/24 1315          Bed Mobility    Bed Mobility supine-sit  -     Scooting/Bridging [place] (Bed Mobility) moderate assist (50% patient effort)  -     Supine-Sit [place] (Bed Mobility) maximum assist (25% patient effort)  -     Comment, (Bed Mobility) able to sit EOB, posterior LOB with min A to recover at times. Extended time needed to be able to flex enough to lower foot to the floor.  -[facility] Name 05/02/24 1315          Bed-Chair Transfer    Bed-Chair [place] (Transfers) not tested  -[facility] Name 05/02/24 1315          Sit-Stand Transfer    Sit-Stand [place] (Transfers) not tested  -[facility] Name 05/02/24 1315          Mobility    Extremity Weight-bearing Status right lower extremity  -     Left Lower Extremity (Weight-bearing Status) weight-bearing as tolerated (WBAT)  -               User Key  (r) = Recorded By, (t) = Taken By, (c) = Cosigned By      Initials Name Provider Type    Misty Wolf PT Physical Therapist                   Obj/Interventions       [facility] Name 05/02/24 1316          Range of Motion Comprehensive    General Range of Motion lower extremity range of motion deficits identified  -     Comment, General Range of Motion RLE limited by pain, preexisting limited knee flexion per pt  -[facility] Name 05/02/24 1316          Strength Comprehensive (MMT)    General  Manual Muscle Testing (MMT) Assessment lower extremity strength deficits identified  -     Comment, General Manual Muscle Testing (MMT) Assessment generlaized weakness  -       Row Name 05/02/24 1316          Motor Skills    Therapeutic Exercise hip;knee  R hip protocol x 10 reps with assist  -       Row Name 05/02/24 1316          Hip (Therapeutic Exercise)    Hip (Therapeutic Exercise) isometric exercises;strengthening exercise  -     Hip Isometrics (Therapeutic Exercise) flexion;extension;gluteal sets  -       Row Name 05/02/24 1316          Knee (Therapeutic Exercise)    Knee (Therapeutic Exercise) isometric exercises;strengthening exercise  -     Knee Isometrics (Therapeutic Exercise) quad sets  -     Knee Strengthening (Therapeutic Exercise) SLR (straight leg raise);SAQ (short arc quad);LAQ (long arc quad);heel slides  -       Row Name 05/02/24 1316          Balance    Balance Interventions sitting;standing;sit to stand  -       Row Name 05/02/24 1316          Sensory Assessment (Somatosensory)    Sensory Assessment (Somatosensory) LE sensation intact  -               User Key  (r) = Recorded By, (t) = Taken By, (c) = Cosigned By      Initials Name Provider Type     Misty Goss, PT Physical Therapist                   Goals/Plan       Row Name 05/02/24 1320          Bed Mobility Goal 1 (PT)    Activity/Assistive Device (Bed Mobility Goal 1, PT) sit to supine/supine to sit  -     [place] Level/Cues Needed (Bed Mobility Goal 1, PT) minimum assist (75% or more patient effort)  -     Time Frame (Bed Mobility Goal 1, PT) long term goal (LTG);1 week  -       Row Name 05/02/24 1320          Transfer Goal 1 (PT)    Activity/Assistive Device (Transfer Goal 1, PT) sit-to-stand/stand-to-sit  -     [place] Level/Cues Needed (Transfer Goal 1, PT) minimum assist (75% or more patient effort)  -     Time Frame (Transfer Goal 1, PT) 3 days;1 week  -[facility] Name  05/02/24 1320          Gait Training Goal 1 (PT)    Activity/Assistive Device (Gait Training Goal 1, PT) gait (walking locomotion)  -     [place] Level (Gait Training Goal 1, PT) minimum assist (75% or more patient effort)  -     Distance (Gait Training Goal 1, PT) 25 ft  -KH     Time Frame (Gait Training Goal 1, PT) long term goal (LTG);1 week  -[facility] Name 05/02/24 1320          ROM Goal 1 (PT)    ROM Goal 1 (PT) Surgical Knee ROM:0-90  -KH     Time Frame (ROM Goal 1, PT) long-term goal (LTG);1 week  -[facility] Name 05/02/24 1320          Patient Education Goal (PT)    Activity (Patient Education Goal, PT) HEP  -KH     [place]/Cues/Accuracy (Memory Goal 2, PT) demonstrates adequately  -KH     Time Frame (Patient Education Goal, PT) 1 week  -[facility] Name 05/02/24 1320          Therapy Assessment/Plan (PT)    Planned Therapy Interventions (PT) balance training;bed mobility training;gait training;home exercise program;ROM (range of motion);patient/family education;stair training;strengthening;stretching;transfer training  -               User Key  (r) = Recorded By, (t) = Taken By, (c) = Cosigned By      Initials Name Provider Type    Misty Wolf, PT Physical Therapist                   Clinical Impression       [facility] Name 05/02/24 1317          Pain    Additional Documentation Pain Scale: FACES Pre/Post-Treatment (Group)  -[facility] Name 05/02/24 1317          Pain Scale: FACES Pre/Post-Treatment    Pain: FACES Scale, Pretreatment 4-->hurts little more  -     Posttreatment Pain Rating 8-->hurts whole lot  -     Pain Location - Side/Orientation Right  -     Pain Location lower  -     Pain Location - extremity  -       Row Name 05/02/24 7113          Plan of Care Review    Plan of Care Reviewed With patient;son  -     Outcome Evaluation Pt admitted from outside facility with femur fracture after fall. Pt is s/p R IM nail and knee poly liner exchange. She is  WBAT. Pt presents with pain , limited ROM and limited activity tolerance secondary to pain. She required mod-max A to transition to sitting EOB. Poor sitting balance noted. Did not attempt standing secondary to limited tolerance of sitting EOB. Pt would benefit from PT to address her impairments. She lives with her son but was independently mobile prior to fall. Recommend rehab placement to further address mobiltiy and independence prior to returning home.  -       Row Name 05/02/24 1317          Therapy Assessment/Plan (PT)    Patient/Family Therapy Goals Statement (PT) pt unable to say.  -     Rehab Potential (PT) fair, will monitor progress closely  -     Criteria for Skilled Interventions Met (PT) yes  -     Therapy Frequency (PT) daily  -       Row Name 05/02/24 1317          Positioning and Restraints    Pre-Treatment Position in bed  -     Post Treatment Position bed  -     In Bed fowlers;call light within reach;encouraged to call for assist;exit alarm on;with family/caregiver;notified [facility]  -               User Key  (r) = Recorded By, (t) = Taken By, (c) = Cosigned By      Initials Name Provider Type    Misty Wolf, PT Physical Therapist                   Outcome Measures       Row Name 05/02/24 1321 05/02/24 0829       How much help from another person do you currently need...    Turning from your back to your side while in flat bed without using bedrails? 2  -KH 2  -VL    Moving from lying on back to sitting on the side of a flat bed without bedrails? 2  -KH 2  -VL    Moving to and from a bed to a chair (including a wheelchair)? 2  -KH 2  -VL    Standing up from a chair using your arms (e.g., wheelchair, bedside chair)? 1  -KH 2  -VL    Climbing 3-5 steps with a railing? 1  -KH 1  -VL    To walk in hospital room? 1  -KH 1  -VL    AM-PAC 6 Clicks Score (PT) 9  -KH 10  -VL    Highest Level of Mobility Goal 3 --> Sit at edge of bed  -KH 4 --> Transfer to chair/commode  -VL               User Key  (r) = Recorded By, (t) = Taken By, (c) = Cosigned By      Initials Name Provider Type    Misty Wolf, EMIR Physical Therapist    Nicole Grace RN Registered Nurse                                 Physical Therapy Education       Title: PT OT SLP Therapies (Not Started)       Topic: Physical Therapy (Not Started)       Point: Mobility training (Not Started)       Learner Progress:  Not documented in this visit.              Point: Home exercise program (Not Started)       Learner Progress:  Not documented in this visit.              Point: Body mechanics (Not Started)       Learner Progress:  Not documented in this visit.              Point: Precautions (Not Started)       Learner Progress:  Not documented in this visit.                                  PT Recommendation and Plan  Planned Therapy Interventions (PT): balance training, bed mobility training, gait training, home exercise program, ROM (range of motion), patient/family education, stair training, strengthening, stretching, transfer training  Plan of Care Reviewed With: patient, son  Outcome Evaluation: Pt admitted from outside facility with femur fracture after fall. Pt is s/p R IM nail and knee poly liner exchange. She is WBAT. Pt presents with pain , limited ROM and limited activity tolerance secondary to pain. She required mod-max A to transition to sitting EOB. Poor sitting balance noted. Did not attempt standing secondary to limited tolerance of sitting EOB. Pt would benefit from PT to address her impairments. She lives with her son but was independently mobile prior to fall. Recommend rehab placement to further address mobiltiy and independence prior to returning home.     Time Calculation:         PT Charges       Row Name 05/02/24 1321             Time Calculation    Start Time 1255  -KH      Stop Time 1317  -KH      Time Calculation (min) 22 min  -KH      PT Received On 05/02/24  -      PT - Next Appointment  05/03/24  -YAZAN      PT Goal Re-Cert Due Date 05/09/24  -KH         Time Calculation- PT    Total Timed Code Minutes- PT 10 minute(s)  -KH         Timed Charges    00264 - PT Therapeutic Exercise Minutes 10  -KH         Untimed Charges    PT Eval/Re-eval Minutes 12  -KH         Total Minutes    Timed Charges Total Minutes 10  -KH      Untimed Charges Total Minutes 12  -KH       Total Minutes 22  -KH                User Key  (r) = Recorded By, (t) = Taken By, (c) = Cosigned By      Initials Name Provider Type    Misty Wolf, PT Physical Therapist                  Therapy Charges for Today       Code Description Service Date Service Provider Modifiers Qty    57036704826 HC PT THER PROC EA 15 MIN 5/2/2024 Misty Goss, PT GP 1    24322749376 HC PT EVAL MOD COMPLEXITY 2 5/2/2024 Misty Goss, PT GP 1            PT G-Codes  AM-PAC 6 Clicks Score (PT): 9  PT Discharge Summary  Anticipated Discharge Disposition (PT): skilled nursing facility, inpatient rehabilitation facility    Misty Goss, PT  5/2/2024

## 2024-05-03 LAB
ANION GAP SERPL CALCULATED.3IONS-SCNC: 6 MMOL/L (ref 5–15)
BASOPHILS # BLD AUTO: 0.01 10*3/MM3 (ref 0–0.2)
BASOPHILS NFR BLD AUTO: 0.2 % (ref 0–1.5)
BUN SERPL-MCNC: 7 MG/DL (ref 8–23)
BUN/CREAT SERPL: 28 (ref 7–25)
CALCIUM SPEC-SCNC: 7.7 MG/DL (ref 8.6–10.5)
CHLORIDE SERPL-SCNC: 103 MMOL/L (ref 98–107)
CO2 SERPL-SCNC: 29 MMOL/L (ref 22–29)
CREAT SERPL-MCNC: 0.25 MG/DL (ref 0.57–1)
DEPRECATED RDW RBC AUTO: 40.4 FL (ref 37–54)
EGFRCR SERPLBLD CKD-EPI 2021: 111.5 ML/MIN/1.73
EOSINOPHIL # BLD AUTO: 0.02 10*3/MM3 (ref 0–0.4)
EOSINOPHIL NFR BLD AUTO: 0.4 % (ref 0.3–6.2)
ERYTHROCYTE [DISTWIDTH] IN BLOOD BY AUTOMATED COUNT: 12.5 % (ref 12.3–15.4)
FERRITIN SERPL-MCNC: 243 NG/ML (ref 13–150)
GLUCOSE SERPL-MCNC: 102 MG/DL (ref 65–99)
HCT VFR BLD AUTO: 21.1 % (ref 34–46.6)
HCT VFR BLD AUTO: 25.2 % (ref 34–46.6)
HGB BLD-MCNC: 7.1 G/DL (ref 12–15.9)
HGB BLD-MCNC: 8.3 G/DL (ref 12–15.9)
IMM GRANULOCYTES # BLD AUTO: 0.02 10*3/MM3 (ref 0–0.05)
IMM GRANULOCYTES NFR BLD AUTO: 0.4 % (ref 0–0.5)
IRON 24H UR-MRATE: 18 MCG/DL (ref 37–145)
IRON SATN MFR SERPL: 9 % (ref 20–50)
LYMPHOCYTES # BLD AUTO: 0.65 10*3/MM3 (ref 0.7–3.1)
LYMPHOCYTES NFR BLD AUTO: 11.4 % (ref 19.6–45.3)
MCH RBC QN AUTO: 30.2 PG (ref 26.6–33)
MCHC RBC AUTO-ENTMCNC: 33.6 G/DL (ref 31.5–35.7)
MCV RBC AUTO: 89.8 FL (ref 79–97)
MONOCYTES # BLD AUTO: 0.7 10*3/MM3 (ref 0.1–0.9)
MONOCYTES NFR BLD AUTO: 12.3 % (ref 5–12)
NEUTROPHILS NFR BLD AUTO: 4.28 10*3/MM3 (ref 1.7–7)
NEUTROPHILS NFR BLD AUTO: 75.3 % (ref 42.7–76)
NRBC BLD AUTO-RTO: 0 /100 WBC (ref 0–0.2)
PLATELET # BLD AUTO: 142 10*3/MM3 (ref 140–450)
PMV BLD AUTO: 10.4 FL (ref 6–12)
POTASSIUM SERPL-SCNC: 3.4 MMOL/L (ref 3.5–5.2)
RBC # BLD AUTO: 2.35 10*6/MM3 (ref 3.77–5.28)
SODIUM SERPL-SCNC: 138 MMOL/L (ref 136–145)
TIBC SERPL-MCNC: 197 MCG/DL (ref 298–536)
TRANSFERRIN SERPL-MCNC: 132 MG/DL (ref 200–360)
WBC NRBC COR # BLD AUTO: 5.68 10*3/MM3 (ref 3.4–10.8)

## 2024-05-03 PROCEDURE — 84466 ASSAY OF TRANSFERRIN: CPT | Performed by: STUDENT IN AN ORGANIZED HEALTH CARE EDUCATION/TRAINING PROGRAM

## 2024-05-03 PROCEDURE — 81003 URINALYSIS AUTO W/O SCOPE: CPT | Performed by: STUDENT IN AN ORGANIZED HEALTH CARE EDUCATION/TRAINING PROGRAM

## 2024-05-03 PROCEDURE — 97535 SELF CARE MNGMENT TRAINING: CPT

## 2024-05-03 PROCEDURE — 86900 BLOOD TYPING SEROLOGIC ABO: CPT

## 2024-05-03 PROCEDURE — 86901 BLOOD TYPING SEROLOGIC RH(D): CPT

## 2024-05-03 PROCEDURE — 85025 COMPLETE CBC W/AUTO DIFF WBC: CPT | Performed by: STUDENT IN AN ORGANIZED HEALTH CARE EDUCATION/TRAINING PROGRAM

## 2024-05-03 PROCEDURE — 85018 HEMOGLOBIN: CPT | Performed by: STUDENT IN AN ORGANIZED HEALTH CARE EDUCATION/TRAINING PROGRAM

## 2024-05-03 PROCEDURE — 85014 HEMATOCRIT: CPT | Performed by: STUDENT IN AN ORGANIZED HEALTH CARE EDUCATION/TRAINING PROGRAM

## 2024-05-03 PROCEDURE — 83540 ASSAY OF IRON: CPT | Performed by: STUDENT IN AN ORGANIZED HEALTH CARE EDUCATION/TRAINING PROGRAM

## 2024-05-03 PROCEDURE — 36430 TRANSFUSION BLD/BLD COMPNT: CPT

## 2024-05-03 PROCEDURE — 97530 THERAPEUTIC ACTIVITIES: CPT | Performed by: PHYSICAL THERAPIST

## 2024-05-03 PROCEDURE — 25010000002 HYDROMORPHONE PER 4 MG: Performed by: STUDENT IN AN ORGANIZED HEALTH CARE EDUCATION/TRAINING PROGRAM

## 2024-05-03 PROCEDURE — 82728 ASSAY OF FERRITIN: CPT | Performed by: STUDENT IN AN ORGANIZED HEALTH CARE EDUCATION/TRAINING PROGRAM

## 2024-05-03 PROCEDURE — 80048 BASIC METABOLIC PNL TOTAL CA: CPT | Performed by: STUDENT IN AN ORGANIZED HEALTH CARE EDUCATION/TRAINING PROGRAM

## 2024-05-03 PROCEDURE — P9016 RBC LEUKOCYTES REDUCED: HCPCS

## 2024-05-03 RX ORDER — POTASSIUM CHLORIDE 750 MG/1
40 TABLET, FILM COATED, EXTENDED RELEASE ORAL EVERY 4 HOURS
Status: COMPLETED | OUTPATIENT
Start: 2024-05-03 | End: 2024-05-03

## 2024-05-03 RX ORDER — AMOXICILLIN 250 MG
2 CAPSULE ORAL 2 TIMES DAILY
Status: DISCONTINUED | OUTPATIENT
Start: 2024-05-03 | End: 2024-05-07 | Stop reason: HOSPADM

## 2024-05-03 RX ORDER — BISACODYL 5 MG/1
5 TABLET, DELAYED RELEASE ORAL DAILY PRN
Status: DISCONTINUED | OUTPATIENT
Start: 2024-05-03 | End: 2024-05-07 | Stop reason: HOSPADM

## 2024-05-03 RX ORDER — BISACODYL 10 MG
10 SUPPOSITORY, RECTAL RECTAL DAILY PRN
Status: DISCONTINUED | OUTPATIENT
Start: 2024-05-03 | End: 2024-05-07 | Stop reason: HOSPADM

## 2024-05-03 RX ORDER — POLYETHYLENE GLYCOL 3350 17 G/17G
17 POWDER, FOR SOLUTION ORAL 2 TIMES DAILY
Status: DISCONTINUED | OUTPATIENT
Start: 2024-05-03 | End: 2024-05-07 | Stop reason: HOSPADM

## 2024-05-03 RX ADMIN — SENNOSIDES AND DOCUSATE SODIUM 2 TABLET: 50; 8.6 TABLET ORAL at 08:30

## 2024-05-03 RX ADMIN — POLYETHYLENE GLYCOL 3350 17 G: 17 POWDER, FOR SOLUTION ORAL at 20:42

## 2024-05-03 RX ADMIN — OXYCODONE AND ACETAMINOPHEN 1 TABLET: 7.5; 325 TABLET ORAL at 08:24

## 2024-05-03 RX ADMIN — Medication 10 ML: at 20:45

## 2024-05-03 RX ADMIN — PANTOPRAZOLE SODIUM 40 MG: 40 TABLET, DELAYED RELEASE ORAL at 05:50

## 2024-05-03 RX ADMIN — SENNOSIDES AND DOCUSATE SODIUM 2 TABLET: 50; 8.6 TABLET ORAL at 20:42

## 2024-05-03 RX ADMIN — POLYETHYLENE GLYCOL 3350 17 G: 17 POWDER, FOR SOLUTION ORAL at 08:26

## 2024-05-03 RX ADMIN — RIVAROXABAN 10 MG: 10 TABLET, FILM COATED ORAL at 08:30

## 2024-05-03 RX ADMIN — OXYCODONE AND ACETAMINOPHEN 1 TABLET: 7.5; 325 TABLET ORAL at 12:37

## 2024-05-03 RX ADMIN — Medication 10 ML: at 08:32

## 2024-05-03 RX ADMIN — POTASSIUM CHLORIDE 40 MEQ: 750 TABLET, EXTENDED RELEASE ORAL at 08:26

## 2024-05-03 RX ADMIN — HYDROMORPHONE HYDROCHLORIDE 0.5 MG: 1 INJECTION, SOLUTION INTRAMUSCULAR; INTRAVENOUS; SUBCUTANEOUS at 05:50

## 2024-05-03 RX ADMIN — OXYCODONE AND ACETAMINOPHEN 1 TABLET: 7.5; 325 TABLET ORAL at 22:30

## 2024-05-03 RX ADMIN — HYDROMORPHONE HYDROCHLORIDE 0.5 MG: 1 INJECTION, SOLUTION INTRAMUSCULAR; INTRAVENOUS; SUBCUTANEOUS at 17:08

## 2024-05-03 RX ADMIN — OXYCODONE AND ACETAMINOPHEN 1 TABLET: 7.5; 325 TABLET ORAL at 02:06

## 2024-05-03 RX ADMIN — OXYCODONE AND ACETAMINOPHEN 1 TABLET: 7.5; 325 TABLET ORAL at 18:27

## 2024-05-03 RX ADMIN — POTASSIUM CHLORIDE 40 MEQ: 750 TABLET, EXTENDED RELEASE ORAL at 12:37

## 2024-05-03 NOTE — PROGRESS NOTES
Name: Eve Fung ADMIT: 2024   : 1940  PCP: Eve Carl, RADHA    MRN: [id number] LOS: 3 days   AGE/SEX: 83 y.o. female  ROOM: [facility]     Subjective   Subjective   Patient seen this morning, no acute events overnight.  Continues to complain of moderate pain.  Hemoglobin trended down to 7.1, no signs of bleeding reported.  Patient not had BM yet.    Review of Systems   As above  Objective   Objective   Vital Signs  Temp:  [97.6 °F (36.4 °C)-99.8 °F (37.7 °C)] 97.6 °F (36.4 °C)  Heart Rate:  [74-86] 80  Resp:  [14-17] 14  BP: (105-124)/(58-71) 117/67  SpO2:  [92 %-99 %] 95 %  on  Flow (L/min):  [2] 2;   Device (Oxygen Therapy): nasal cannula  Body mass index is 25.48 kg/m².  Physical Exam    General: Alert, laying in bed, no distress,  HEENT: Normocephalic, atraumatic, hard of hearing  CV: Regular rate and rhythm, no murmurs rubs or gallops  Lungs: Clear to auscultation bilaterally, no crackles or wheezes  Abdomen: Soft, nontender, nondistended  Extremities: Right upper extremity dressing in place, clean, intact.  Swelling with no significant hematoma/bruising noted.  Able to wiggle toes.  Intact sensation.    Results Review     I reviewed the patient's new clinical results.  Results from last 7 days   Lab Units 24  2151   WBC 10*3/mm3 5.68 8.79 8.55 11.05*   HEMOGLOBIN g/dL 7.1* 8.6* 10.8* 11.7*   PLATELETS 10*3/mm3 142 168 209 238     Results from last 7 days   Lab Units 24  2150   SODIUM mmol/L 138 135* 136 137   POTASSIUM mmol/L 3.4* 4.0 4.8 3.0*   CHLORIDE mmol/L 103 102 102 102   CO2 mmol/L 29.0 25.4 26.7 27.5   BUN mg/dL 7* 11 14 15   CREATININE mg/dL 0.25* 0.38* 0.48* 0.56*   GLUCOSE mg/dL 102* 150* 120* 161*   Estimated Creatinine Clearance: 154.1 mL/min (A) (by C-G formula based on SCr of 0.25 mg/dL (L)).  Results from last 7 days   Lab Units 24  2150   ALBUMIN g/dL 3.9  "  BILIRUBIN mg/dL 0.7   ALK PHOS U/L 69   AST (SGOT) U/L 20   ALT (SGPT) U/L 14     Results from last 7 days   Lab Units 05/03/24  0327 05/02/24  0454 05/01/24  0530 04/30/24  2150   CALCIUM mg/dL 7.7* 8.1* 8.8 9.1   ALBUMIN g/dL  --   --   --  3.9   MAGNESIUM mg/dL  --   --  2.0 1.8   PHOSPHORUS mg/dL  --   --  3.3 2.8     No results found for: \"COVID19\"  No results found for: \"HGBA1C\", \"POCGLU\"        XR Femur 2 View Right  Narrative: 2 VIEWS RIGHT FEMUR     HISTORY: Postop right femoral nail     COMPARISON: May 1, 2024     FINDINGS:  The patient has undergone intramedullary rupa fixation of the previously  identified distal femoral diaphyseal fracture. Bony alignment appears  improved. The patient is also status post right knee arthroplasty, and  this hardware appears to be stable in position. There is a suprapatellar  effusion.     Impression: Postoperative findings, as noted above.     This report was finalized on 5/1/2024 10:36 PM by Dr. Uzma Clay M.D on Workstation: [id number]     FL C Arm During Surgery  This procedure was auto-finalized with no dictation required.  XR Femur 2 View Right  XR FEMUR 2 VW RIGHT-     Clinical: Surgical planning, femur fracture     FINDINGS: There is a spiral fracture demonstrated through the distal  right femoral diaphysis extending into the metaphysis with moderate  angulation and fairly significant overlapping of the bone ends with  foreshortening. Fracture line may extend to the femoral component  anteriorly. No soft tissue gas or radiopaque foreign body seen. There is  hip joint narrowing, otherwise within normal limits. Proximal femur is  satisfactory in appearance.     This report was finalized on 5/1/2024 7:58 AM by Dr. Nathan Phillips M.D  on Workstation: [id number]       Scheduled Medications  pantoprazole, 40 mg, Oral, Q AM  senna-docusate sodium, 2 tablet, Oral, BID   And  polyethylene glycol, 17 g, Oral, BID  rivaroxaban, 10 mg, Oral, Daily With " "Breakfast  sodium chloride, 10 mL, Intravenous, Q12H    Infusions  lactated ringers, 9 mL/hr, Last Rate: 9 mL/hr (05/01/24 2100)    Diet  Diet: Regular/House; Fluid Consistency: Thin (IDDSI 0)    I have personally reviewed     [x]  Laboratory   [x]  Microbiology   [x]  Radiology   [x]  EKG/Telemetry  []  Cardiology/Vascular   []  Pathology    []  Records       Assessment/Plan     Active Hospital Problems    Diagnosis  POA    **Closed right hip fracture [S72.001A]  Yes    GERD without esophagitis [K21.9]  Unknown    Other fracture of right femur, initial encounter for closed fracture [S72.8X1A]  Unknown    Degeneration of lumbar intervertebral disc [M51.36]  Yes    Anxiety [F41.9]  Yes    Chronic back pain [M54.9, G89.29]  Yes    S/P TKR (total knee replacement), right [Z96.651]  Not Applicable      Resolved Hospital Problems   No resolved problems to display.       Patient is a 83-year-old female with a history of including but not limited to chronic back pain, GERD, anxiety, total right knee replacement, presented to the outside ED after mechanical fall complaining of right hip pain.  Was found to have right hip fracture and was transferred to [facility] after discussion with orthopedic surgery service  ( Dr. Monte) due to requiring complicated surgery.     Right hip fracture  -Secondary to mechanical fall  -Status post-RIGHT FEMUR INTRAMEDULLARY NAILING RETROGRADE AND RIGHT KNEE POLY LINER EXCHANGE on 5/1/2024   At the patient recommendations\"  DVT PPX: Sequential compression devices, and chemical prophylaxis with Xarelto 10 mg for 6 weeks or longer if minimal ambulation  Post-Op Xray: Hardware in good position. Acceptable bony reduction.   Dressing: Change postoperative day 3 with dry dressing, island dressing if minimal drainage or 4 x 4's, ABD pad, and wrap  Follow-Up: In office 2 to 3 weeks weeks postop, please call the office at [phone]  to make appointment.\"  Pending placement to rehab.     " postop blood loss anemia  -Hemoglobin was 11.7, trended down to 7.1 today.  -No signs of bleeding, Hemoccult stool ordered  -1 unit of PRBC ordered to be transfused, repeat H&H this afternoon    UTI?  -Patient complains of dysuria/frequent urination  -Urinalysis ordered.  -WBC normalized, afebrile     Anxiety  -Continue home as needed Klonopin        GERD  -On omeprazole outpatient,  -Initiate pantoprazole 40 mg daily     Hypokalemia  -Potassium 3.4, replace per protocol            DVT prophylaxis.  Xarelto  Full code.  Discussed with patient and family.  Discussed with RN  Anticipate discharge to SNU facility in 1-2 days.  If hemoglobin remains stable      Copied text in this note has been reviewed and is accurate as of 05/03/24.         Dictated utilizing Dragon dictation        Clay Sawyer MD  [facility]  05/03/24  17:47 EDT

## 2024-05-03 NOTE — DISCHARGE PLACEMENT REQUEST
"Raissa Fung (83 y.o. Female)       Date of Birth   1940    Social Security Number       Address   [address] [place], [place] [address]    Home Phone   [phone]    MRN   [id number]       Jainism   Unknown    Marital Status                               Admission Date   4/30/24    Admission Type   Urgent    Admitting Provider   Clay Sawyer MD    Attending Provider   Clay Sawyer MD    Department, Room/Bed   [address], P794/1       Discharge Date       Discharge Disposition       Discharge Destination                                 Attending Provider: Clay Sawyer MD    Allergies: Acetaminophen, Aspirin    Isolation: None   Infection: None   Code Status: CPR    Ht: 157.5 cm (62\")   Wt: 63.2 kg (139 lb 5.3 oz)    Admission Cmt: None   Principal Problem: Closed right hip fracture [S72.001A]                   Active Insurance as of 4/30/2024       Primary Coverage       Payor Plan Insurance Group Employer/Plan Group    [facility] MEDICARE REPLACEMENT AARP MED ADV PPO [id number]       Payor Plan Address Payor Plan Phone Number Payor Plan Fax Number Effective Dates    [address]   3/1/2024 - None Entered    [facility] [id number]         Subscriber Name Subscriber Birth Date Member ID       RAISSA FUNG 1940 [id number]                     Emergency Contacts        (Rel.) Home Phone Work Phone Mobile Phone    Tyrese Aragon (Son) [phone] -- [phone]            "

## 2024-05-03 NOTE — PROGRESS NOTES
Orthopaedic & Sports Medicine Surgery  Dr. Luis Rivas MD  [phone]    Daily Progress Note    DEMOGRAPHICS:   Eve Fung   Age:83 y.o.   MRN:[id number]  Admitted: 4/30/2024    PROCEDURE: 2 Days Post-Op s/p Right Procedure(s):  RIGHT FEMUR INTRAMEDULLARY NAILING RETROGRADE AND RIGHT KNEE POLY LINER EXCHANGE     HOSPITAL PROGRESS  Patient Issues: Overall doing well.  Has worked with physical therapy.  Has not really want to put much weight on it.  Feels better than before surgery.  Ambulation/Activity: Up to chair with PT/Nursing.      VITALS:  Vitals:    05/02/24 1834 05/02/24 2032 05/03/24 0000 05/03/24 0513   BP: 118/63 105/63  119/71   BP Location: Right arm Right arm  Right arm   Patient Position: Lying Lying  Lying   Pulse: 74 78  86   Resp: 16 16  16   Temp: 98.8 °F (37.1 °C) 98.8 °F (37.1 °C)  99.8 °F (37.7 °C)   TempSrc: Oral Oral Oral Oral   SpO2: 99% 92%  98%   Weight:       Height:           PHYSICAL EXAM:  CONSTITUTIONAL: Answers questions appropriately although she is hard of hearing  CARDIOVASCULAR: brisk capillary refill intact  WOUND: Dressing/splint clean, dry, and intact  EXTREMITY: Operative Leg  Pulses: brisk capillary refill intact  Sensation: Sensation intact to light touch to the tibial/deep peroneal/superficial peroneal nerves, and grossly throughout the extremity.  Motor: Able to flex/ext toes, no pain with passive stretch, compartments soft and compressible    LABS:   Lab Results   Component Value Date    HGB 7.1 (L) 05/03/2024     Lab Results   Component Value Date    WBC 5.68 05/03/2024     Lab Results   Component Value Date    GLUCOSE 102 (H) 05/03/2024    CALCIUM 7.7 (L) 05/03/2024     05/03/2024    K 3.4 (L) 05/03/2024    CO2 29.0 05/03/2024     05/03/2024    BUN 7 (L) 05/03/2024    CREATININE 0.25 (L) 05/03/2024    EGFR 111.5 05/03/2024    BCR 28.0 (H) 05/03/2024    ANIONGAP 6.0 05/03/2024       ASSESSMENT: Patient is a 83 y.o. female who is 2 Days Post-Op  s/p Right Procedure(s):  RIGHT FEMUR INTRAMEDULLARY NAILING RETROGRADE AND RIGHT KNEE POLY LINER EXCHANGE on 5/1/2024    PLAN:   Weight Bearing: Weight Bearing As Tolerated as pain allows  Labs: Above lab values review. Plan:  Hematocrit changed out this morning,  primary has ordered transfusion  PT/OT: To Mobilize  Postoperative antibiotics to be completed yesterday  DVT PPX: Sequential compression devices, and chemical prophylaxis with Xarelto 10 mg for 6 weeks or longer if minimal ambulation  Post-Op Xray: Hardware in good position. Acceptable bony reduction.   Dressing: Change postoperative day 3 with dry dressing, island dressing if minimal drainage or 4 x 4's, ABD pad, and wrap  Follow-Up: In office 2 to 3 weeks weeks postop, please call the office at [phone]  to make appointment.  Dispo: Per primary.  Likely placement.      Luis Rivas MD  [facility]  Orthopaedic Surgery, Sports Medicine  [phone]

## 2024-05-03 NOTE — PLAN OF CARE
Goal Outcome Evaluation:  Plan of Care Reviewed With: patient, son        Progress: improving  Outcome Evaluation: Patient asleep on OT/PT arrival this p.m. but easily roused and agreeable to treatment with encouragement.  Sons both present at bedside.  Patient reports fatigue and continued pain in right lower extremity.  Patient educated on increased movement more frequently throughout the day even if only sitting EOB during meals to steadily increase endurance and activity tolerance and assist with pain.  Patient tolerated sitting EOB for grooming ADLs with set up assist and increased time.  Patient mod a x 2 with bed mobility and min a x 2 for STS at edge of bed with FWWx and cues for breathing, sequencing, upright posture and took sidesteps along EOB this date which is noted improvement over last session yesterday.  OT will continue to follow-up with patient and progress as tolerated.      Anticipated Discharge Disposition (OT): skilled nursing facility, inpatient rehabilitation facility

## 2024-05-03 NOTE — THERAPY TREATMENT NOTE
Patient Name: Eve Fung  : 1940    MRN: [id number]                              Today's Date: 5/3/2024       Admit Date: 2024    Visit Dx:     ICD-10-CM ICD-9-CM   1. Other fracture of right femur, initial encounter for closed fracture  S72.8X1A 821.00     Patient Active Problem List   Diagnosis    Osteoarthritis of knees, bilateral    Carpal tunnel syndrome of left wrist    Pain of right hip joint    S/P TKR (total knee replacement), right    Arthritis of carpometacarpal (CMC) joint of right thumb    Pain in limb    Degeneration of lumbar intervertebral disc    Chronic back pain    Anxiety    Osteoarthritis of carpometacarpal (CMC) joint of left thumb    Greater trochanteric bursitis of right hip    Bilateral wrist pain    Primary osteoarthritis of first carpometacarpal joint of right hand    Greater trochanteric bursitis of left hip    Closed right hip fracture    GERD without esophagitis    Other fracture of right femur, initial encounter for closed fracture     Past Medical History:   Diagnosis Date    Osteoporosis      Past Surgical History:   Procedure Laterality Date    APPENDECTOMY      CHOLECYSTECTOMY      FEMUR IM NAILING RETROGRADE Right 2024    Procedure: RIGHT FEMUR INTRAMEDULLARY NAILING RETROGRADE AND RIGHT KNEE POLY LINER EXCHANGE;  Surgeon: Luis Rivas MD;  Location: [facility];  Service: Orthopedics;  Laterality: Right;    HYSTERECTOMY        General Information       Row Name 24 4020          Physical Therapy Time and Intention    Document Type therapy note (daily note)  -YAZAN     Mode of Treatment physical therapy;co-treatment;occupational therapy  -YAZAN               User Key  (r) = Recorded By, (t) = Taken By, (c) = Cosigned By      Initials Name Provider Type    Misty Wolf, PT Physical Therapist                   Mobility       Row Name 24 0038          Bed Mobility    Bed Mobility supine-sit;sit-supine  -YAZAN     Supine-Sit [place] (Bed  Mobility) moderate assist (50% patient effort);2 person assist  -     Sit-Supine [place] (Bed Mobility) moderate assist (50% patient effort);2 person assist  -     Assistive Device (Bed Mobility) bed rails;draw sheet  -       Row Name 05/03/24 1555          Sit-Stand Transfer    Sit-Stand [place] (Transfers) minimum assist (75% patient effort);2 person assist  -     Assistive Device (Sit-Stand Transfers) walker, front-wheeled  -[facility] Name 05/03/24 1555          Gait/Stairs (Locomotion)    [place] Level (Gait) minimum assist (75% patient effort);2 person assist  -     Assistive Device (Gait) walker, front-wheeled  -     Distance in Feet (Gait) --  1 step forward with each foot then 3 sidesteps to HOB  -[facility] Name 05/03/24 1555          Mobility    Extremity Weight-bearing Status right lower extremity  -     Right Lower Extremity (Weight-bearing Status) weight-bearing as tolerated (WBAT)  -               User Key  (r) = Recorded By, (t) = Taken By, (c) = Cosigned By      Initials Name Provider Type    Misty Wolf, EMIR Physical Therapist                   Obj/Interventions       [facility] Name 05/03/24 1556          Balance    Balance Assessment sitting static balance;standing static balance  -     Static Sitting Balance contact guard  -     Position, Sitting Balance sitting edge of bed  -     Static Standing Balance minimal assist  -     Position/Device Used, Standing Balance walker, rolling  -               User Key  (r) = Recorded By, (t) = Taken By, (c) = Cosigned By      Initials Name Provider Type    Misty Wolf, EMIR Physical Therapist                   Goals/Plan    No documentation.                  Clinical Impression       [facility] Name 05/03/24 1557          Pain    Pretreatment Pain Rating 6/10  -KH     Posttreatment Pain Rating 6/10  -KH     Pain Location - Side/Orientation Right  -     Pain Location lower  -     Pain Location -  extremity  -     Pain Intervention(s) Cold applied;Rest;Repositioned  -YAZAN       Row Name 05/03/24 1557          Plan of Care Review    Plan of Care Reviewed With patient;son  -YAZAN     Outcome Evaluation Pt was sleeping when PT/OT arrived. Pt was getting a unit of blood which had been running over an hour. Sons were at bedside. Pt continues to be in pain, but particiapted in therapy more today. She transferred to EOB with mod Ax2. She stood from an elevated bed surface with min A x2 and was able to take a few steps towards HOB. Further ambualtion distance was limited by fatigue, pain and lightheadedness. Continue to recommend SNF placement.  -YAZAN       Row Name 05/03/24 1557          Positioning and Restraints    Pre-Treatment Position in bed  -     Post Treatment Position bed  -     In Bed fowlers;call light within reach;exit alarm on;patient within staff view;with family/caregiver;notified [facility]  -               User Key  (r) = Recorded By, (t) = Taken By, (c) = Cosigned By      Initials Name Provider Type    Misty Wolf, PT Physical Therapist                   Outcome Measures       Row Name 05/03/24 1559 05/03/24 0815       How much help from another person do you currently need...    Turning from your back to your side while in flat bed without using bedrails? 2  -KH 3  -KATIUSKA    Moving from lying on back to sitting on the side of a flat bed without bedrails? 2  -KH 3  -KATIUSKA    Moving to and from a bed to a chair (including a wheelchair)? 2  -YAZAN 2  -KATIUSKA    Standing up from a chair using your arms (e.g., wheelchair, bedside chair)? 3  -KH 2  -KATIUSKA    Climbing 3-5 steps with a railing? 1  -KH 1  -KATIUSKA    To walk in hospital room? 2  -KH 2  -KATIUSKA    AM-PAC 6 Clicks Score (PT) 12  -KH 13  -KATIUSKA    Highest Level of Mobility Goal 4 --> Transfer to chair/commode  -YAZAN 4 --> Transfer to chair/commode  -KATIUSKA      Row Name 05/03/24 1559          Functional Assessment    Outcome Measure Options AM-PAC 6 Clicks Basic  Mobility (PT)  -               User Key  (r) = Recorded By, (t) = Taken By, (c) = Cosigned By      Initials Name Provider Type    Misty Wolf, EMIR Physical Therapist    Vic Ibanez, RN Registered Nurse                                 Physical Therapy Education       Title: PT OT SLP Therapies (In Progress)       Topic: Physical Therapy (Not Started)       Point: Mobility training (Not Started)       Learner Progress:  Not documented in this visit.              Point: Home exercise program (Not Started)       Learner Progress:  Not documented in this visit.              Point: Body mechanics (Not Started)       Learner Progress:  Not documented in this visit.              Point: Precautions (Not Started)       Learner Progress:  Not documented in this visit.                                  PT Recommendation and Plan  Planned Therapy Interventions (PT): balance training, bed mobility training, gait training, home exercise program, ROM (range of motion), patient/family education, stair training, strengthening, stretching, transfer training  Plan of Care Reviewed With: patient, son  Outcome Evaluation: Pt was sleeping when PT/OT arrived. Pt was getting a unit of blood which had been running over an hour. Sons were at bedside. Pt continues to be in pain, but particiapted in therapy more today. She transferred to [facility] with mod Ax2. She stood from an elevated bed surface with min A x2 and was able to take a few steps towards HOB. Further ambualtion distance was limited by fatigue, pain and lightheadedness. Continue to recommend SNF placement.     Time Calculation:         PT Charges       Row Name 05/03/24 1600             Time Calculation    Start Time 1521  -      Stop Time 1555  -      Time Calculation (min) 34 min  -      PT Received On 05/03/24  -      PT - Next Appointment 05/04/24  -         Time Calculation- PT    Total Timed Code Minutes- PT 34 minute(s)  -         Timed Charges     10453 - PT Therapeutic Activity Minutes 34  -KH         Total Minutes    Timed Charges Total Minutes 34  -KH       Total Minutes 34  -KH                User Key  (r) = Recorded By, (t) = Taken By, (c) = Cosigned By      Initials Name Provider Type    Msity Wolf, PT Physical Therapist                  Therapy Charges for Today       Code Description Service Date Service Provider Modifiers Qty    19578803290 HC PT THER PROC EA 15 MIN 5/2/2024 Misty Goss, PT GP 1    47901322290 HC PT EVAL MOD COMPLEXITY 2 5/2/2024 Misty Goss, PT GP 1    44904197032 HC PT THERAPEUTIC ACT EA 15 MIN 5/3/2024 Misty Goss, PT GP 2            PT G-Codes  Outcome Measure Options: AM-PAC 6 Clicks Basic Mobility (PT)  AM-PAC 6 Clicks Score (PT): 12  AM-PAC 6 Clicks Score (OT): 13  PT Discharge Summary  Anticipated Discharge Disposition (PT): skilled nursing facility, inpatient rehabilitation facility    Msity Goss, PT  5/3/2024

## 2024-05-03 NOTE — CASE MANAGEMENT/SOCIAL WORK
Post-Acute Authorization Submission      Post Acute Pre-Cert Documentation  Request Submitted by Facility - Type:: Hospital  Post-Acute Authorization Type Submitted:: SNF  Date Post Acute Pre-Cert Inititated per Facility: 05/03/24  Accepting Facility: [facility] Discharge Date Requested: 05/04/24  All Clinicals Submitted?: Yes  Had Accepting Facility at Time of Submission: Yes  Response Communicated to:: , Accepting Facility Liaison  Authorization Number:: [id number]  Post Acute Pre-Cert Initiated Comment: LAISHA Arroyo, PCT

## 2024-05-03 NOTE — PLAN OF CARE
Goal Outcome Evaluation:  Plan of Care Reviewed With: patient, son            VSS. Iv fluids infusing.on 2l nc. Pain treated with prn pain meds.All needs met. WCTM

## 2024-05-03 NOTE — PROGRESS NOTES
Continued Stay Note  [facility]     Patient Name: Eve Fung  MRN: [id number]  Today's Date: 5/3/2024    Admit Date: 4/30/2024    Plan: [facility], pending precert   Discharge Plan       Row Name 05/03/24 1603       Plan    Plan Comments New PT notes available, precert has been initiated. CCP to follow.      Row Name 05/03/24 1545       Plan    Plan [facility] SNF, pending precert    Patient/Family in Agreement with Plan yes    Plan Comments Spoke with pts son regarding d/c planning [facility] [phone]. 1st choice [facility] and 3. [facility] in [place]. Per Aisha/[employer] patient is approved pending precert for [facility]. Email sent to Post Acute team to start precert however new PT notes are needed to start cert, notified Catherine/PT who states PT and OT will be seeing patient together this afternoon and she will notify when new notes are in. Plan will be to d/c to [facility] when precert approved. Pt will likely need EMS vs  van transport--at this time non ambulatory. CCP to follow. Partial packet will be in CCP office and pharmacy will be updated for SNF.                   Discharge Codes    No documentation.                 Expected Discharge Date and Time       Expected Discharge Date Expected Discharge Time    May 6, 2024               Reena Donald RN

## 2024-05-03 NOTE — THERAPY TREATMENT NOTE
Patient Name: Eve Fung  : 1940    MRN: [id number]                              Today's Date: 5/3/2024       Admit Date: 2024    Visit Dx:     ICD-10-CM ICD-9-CM   1. Other fracture of right femur, initial encounter for closed fracture  S72.8X1A 821.00     Patient Active Problem List   Diagnosis    Osteoarthritis of knees, bilateral    Carpal tunnel syndrome of left wrist    Pain of right hip joint    S/P TKR (total knee replacement), right    Arthritis of carpometacarpal (CMC) joint of right thumb    Pain in limb    Degeneration of lumbar intervertebral disc    Chronic back pain    Anxiety    Osteoarthritis of carpometacarpal (CMC) joint of left thumb    Greater trochanteric bursitis of right hip    Bilateral wrist pain    Primary osteoarthritis of first carpometacarpal joint of right hand    Greater trochanteric bursitis of left hip    Closed right hip fracture    GERD without esophagitis    Other fracture of right femur, initial encounter for closed fracture     Past Medical History:   Diagnosis Date    Osteoporosis      Past Surgical History:   Procedure Laterality Date    APPENDECTOMY      CHOLECYSTECTOMY      FEMUR IM NAILING RETROGRADE Right 2024    Procedure: RIGHT FEMUR INTRAMEDULLARY NAILING RETROGRADE AND RIGHT KNEE POLY LINER EXCHANGE;  Surgeon: Luis Rivas MD;  Location: [facility];  Service: Orthopedics;  Laterality: Right;    HYSTERECTOMY        General Information       Row Name 24 175          OT Time and Intention    Document Type therapy note (daily note)  -KATIUSKA     Mode of Treatment co-treatment;occupational therapy;physical therapy  -KATIUSKA       Row Name 24 175          General Information    Patient Profile Reviewed yes  -KATIUSKA     Existing Precautions/Restrictions fall;oxygen therapy device and L/min;right  RLE WBAT  -KATIUSKA       Row Name 24 175          Living Environment    People in Home child(nadine), adult  -KATIUSKA       Row Name 24 175           Cognition    Orientation Status (Cognition) oriented x 4  -KATIUSKA       Row Name 05/03/24 1752          Safety Issues, Functional Mobility    Impairments Affecting Function (Mobility) endurance/activity tolerance;pain;range of motion (ROM);strength;balance  -KATIUSKA     Comment, Safety Issues/Impairments (Mobility) Co-treatment medically necessary and appropriate d/t pt's acuity level, decreased endurance and activity tolerance, and safety of patient and staff. Treatment focused on progression of care and goals established in POC. Gait belt and non-skid socks worn.  -KATIUSKA               User Key  (r) = Recorded By, (t) = Taken By, (c) = Cosigned By      Initials Name Provider Type    Ada Dai OT Occupational Therapist                     Mobility/ADL's       Row Name 05/03/24 1754          Bed Mobility    Bed Mobility supine-sit;sit-supine  -KATIUSKA     Supine-Sit Newborn (Bed Mobility) moderate assist (50% patient effort);2 person assist;verbal cues;nonverbal cues (demo/gesture)  -KATIUSKA     Sit-Supine Newborn (Bed Mobility) moderate assist (50% patient effort);2 person assist;verbal cues;nonverbal cues (demo/gesture)  -KATIUSKA     Assistive Device (Bed Mobility) bed rails;draw sheet  -KATIUSKA     Comment, (Bed Mobility) Improvement noted during bed mobility this date and patient able to tolerate sitting edge of bed with right lower extremity resting on ground cues for sequencing and hand placement using bed rails, and deep breathing techniques  -KATIUSKA       Row Name 05/03/24 1754          Transfers    Transfers sit-stand transfer  -KATIUSKA       Row Name 05/03/24 1754          Sit-Stand Transfer    Sit-Stand Newborn (Transfers) minimum assist (75% patient effort);2 person assist;verbal cues;nonverbal cues (demo/gesture)  -KATIUSKA     Assistive Device (Sit-Stand Transfers) walker, front-wheeled  -KATIUSKA     Comment, (Sit-Stand Transfer) X 1 STS at edge of bed, cues for sequencing, hand placement, and upright posture  -KATIUSKA       Row Name  05/03/24 1754          Functional Mobility    Functional Mobility- Ind. Level verbal cues required;nonverbal cues required (demo/gesture);minimum assist (75% patient effort);moderate assist (50% patient effort);2 person assist required  -KATIUSKA     Functional Mobility- Device walker, front-wheeled  -KATIUSKA     Functional Mobility- Comment Patient able to take sidesteps along EOB with cues for sequencing.  Patient noted to improve since last session with increased sitting and standing tolerance.  -KATIUSKA       Row Name 05/03/24 1754          Activities of Daily Living    BADL Assessment/Intervention grooming  -KATIUSKA       Row Name 05/03/24 1754          Mobility    Extremity Weight-bearing Status right lower extremity  -KATIUSKA     Right Lower Extremity (Weight-bearing Status) weight-bearing as tolerated (WBAT)  -KATIUSKA       [facility] Name 05/03/24 1754          Upper Body Dressing Assessment/Training    [place] Level (Upper Body Dressing) not tested  -KATIUSKA       Row Name 05/03/24 1754          Grooming Assessment/Training    [place] Level (Grooming) grooming skills;wash face, hands;set up;verbal cues  -KATIUSKA     Position (Grooming) edge of bed sitting  -KATIUSKA       [facility] Name 05/03/24 1754          Toileting Assessment/Training    [place] Level (Toileting) not tested  -KATIUSKA               User Key  (r) = Recorded By, (t) = Taken By, (c) = Cosigned By      Initials Name Provider Type    Ada Dai OT Occupational Therapist                   Obj/Interventions       Row Name 05/03/24 1759          Vision Assessment/Intervention    Visual Impairment/Limitations WFL;corrective lenses for reading  -KATIUSKA       Row Name 05/03/24 1759          Motor Skills    Motor Skills functional endurance  -KATIUSKA     Functional Endurance Poor +; patient noted to improve sitting and standing tolerance and able to participate in functional ax and mobility at sitting and standing at edge of bed  -KATIUSKA       Row Name 05/03/24 1759          Balance    Balance  Interventions occupation based/functional task  -KATIUSKA     Comment, Balance No overt L OB noted this date  -KATIUSKA               User Key  (r) = Recorded By, (t) = Taken By, (c) = Cosigned By      Initials Name Provider Type    Ada Dai OT Occupational Therapist                   Goals/Plan    No documentation.                  Clinical Impression       Row Name 05/03/24 1800          Pain Assessment    Pretreatment Pain Rating 6/10  -KATIUSKA     Posttreatment Pain Rating 6/10  -KATIUSKA     Pain Location - Side/Orientation Right  -KATIUSKA     Pain Location lower  -KATIUSKA     Pain Location - extremity  -KATIUSKA     Pain Intervention(s) Elevated;Cold applied;Rest;Repositioned  -KATIUSKA       Row Name 05/03/24 1800          Plan of Care Review    Plan of Care Reviewed With patient;son  -KATIUSKA     Progress improving  -KATIUSKA     Outcome Evaluation Patient asleep on OT/PT arrival this p.m. but easily roused and agreeable to treatment with encouragement.  Sons both present at bedside.  Patient reports fatigue and continued pain in right lower extremity.  Patient educated on increased movement more frequently throughout the day even if only sitting EOB during meals to steadily increase endurance and activity tolerance and assist with pain.  Patient tolerated sitting EOB for grooming ADLs with set up assist and increased time.  Patient mod a x 2 with bed mobility and min a x 2 for STS at edge of bed with FWWx and cues for breathing, sequencing, upright posture and took sidesteps along EOB this date which is noted improvement over last session yesterday.  OT will continue to follow-up with patient and progress as tolerated.  -KATIUSKA       Row Name 05/03/24 1800          Therapy Plan Review/Discharge Plan (OT)    Anticipated Discharge Disposition (OT) skilled nursing facility;inpatient rehabilitation facility  -KATIUSKA       Row Name 05/03/24 1800          Vital Signs    O2 Delivery Pre Treatment nasal cannula  -KATIUSKA     O2 Delivery Intra Treatment nasal cannula  -KATIUSKA      O2 Delivery Post Treatment nasal cannula  -KATIUSKA       Row Name 05/03/24 1800          Positioning and Restraints    Pre-Treatment Position in bed  -KATIUSKA     Post Treatment Position bed  -KATIUSKA     In Bed fowlers;call light within reach;encouraged to call for assist;exit alarm on;with family/caregiver;notified nsg;RUE elevated;LUE elevated;legs elevated  -KATIUSKA               User Key  (r) = Recorded By, (t) = Taken By, (c) = Cosigned By      Initials Name Provider Type    Ada Dai OT Occupational Therapist                   Outcome Measures       Row Name 05/03/24 1807          How much help from another is currently needed...    Putting on and taking off regular lower body clothing? 2  -KATIUSKA     Bathing (including washing, rinsing, and drying) 2  -KATIUSKA     Toileting (which includes using toilet bed pan or urinal) 1  -KATIUSKA     Putting on and taking off regular upper body clothing 3  -KATIUSKA     Taking care of personal grooming (such as brushing teeth) 3  -KATIUSKA     Eating meals 3  -KATIUSKA     AM-PAC 6 Clicks Score (OT) 14  -KATIUSKA       Row Name 05/03/24 1559 05/03/24 0815       How much help from another person do you currently need...    Turning from your back to your side while in flat bed without using bedrails? 2  -KH 3  -JDA    Moving from lying on back to sitting on the side of a flat bed without bedrails? 2  -KH 3  -JDA    Moving to and from a bed to a chair (including a wheelchair)? 2  -KH 2  -JDA    Standing up from a chair using your arms (e.g., wheelchair, bedside chair)? 3  -KH 2  -JDA    Climbing 3-5 steps with a railing? 1  -KH 1  -JDA    To walk in hospital room? 2  -KH 2  -JDA    AM-PAC 6 Clicks Score (PT) 12  -KH 13  -JDA    Highest Level of Mobility Goal 4 --> Transfer to chair/commode  -KH 4 --> Transfer to chair/commode  -JDA      Row Name 05/03/24 1807 05/03/24 1559       Functional Assessment    Outcome Measure Options AM-PAC 6 Clicks Daily Activity (OT)  -KATIUSKA AM-PAC 6 Clicks Basic Mobility (PT)  -YAZAN               User Key  (r) = Recorded By, (t) = Taken By, (c) = Cosigned By      Initials Name Provider Type    Misty Wolf, PT Physical Therapist    Vic Mcmahan, RN Registered Nurse    Ada Dai, OT Occupational Therapist                    Occupational Therapy Education       Title: PT OT SLP Therapies (In Progress)       Topic: Occupational Therapy (Done)       Point: ADL training (Done)       Description:   Instruct learner(s) on proper safety adaptation and remediation techniques during self care or transfers.   Instruct in proper use of assistive devices.                  Learning Progress Summary             Patient Acceptance, E,D, VU by KATIUSKA at 5/2/2024 1821    Comment: Role of OT and safety and ADL techs   Family Acceptance, E,D, VU by KATIUSKA at 5/2/2024 1821    Comment: Role of OT and safety and ADL techs                         Point: Home exercise program (Done)       Description:   Instruct learner(s) on appropriate technique for monitoring, assisting and/or progressing therapeutic exercises/activities.                  Learning Progress Summary             Patient Acceptance, E,D, VU by KATIUSKA at 5/2/2024 1821    Comment: Role of OT and safety and ADL techs   Family Acceptance, E,D, VU by KATIUSKA at 5/2/2024 1821    Comment: Role of OT and safety and ADL techs                         Point: Precautions (Done)       Description:   Instruct learner(s) on prescribed precautions during self-care and functional transfers.                  Learning Progress Summary             Patient Acceptance, E,D, VU by KATIUSKA at 5/2/2024 1821    Comment: Role of OT and safety and ADL techs   Family Acceptance, E,D, VU by KATIUSKA at 5/2/2024 1821    Comment: Role of OT and safety and ADL techs                         Point: Body mechanics (Done)       Description:   Instruct learner(s) on proper positioning and spine alignment during self-care, functional mobility activities and/or exercises.                  Learning Progress  Summary             Patient Acceptance, WENDI CASAREZ, VU by KATIUSKA at 5/2/2024 1821    Comment: Role of OT and safety and ADL techs   Family Acceptance, WENDI CASAREZ, VU by KATIUSKA at 5/2/2024 1821    Comment: Role of OT and safety and ADL techs                                         User Key       Initials Effective Dates Name Provider Type Discipline    KATIUSKA 10/12/23 -  Ada Evans OT Occupational Therapist OT                  OT Recommendation and Plan  Planned Therapy Interventions (OT): activity tolerance training, BADL retraining, functional balance retraining, occupation/activity based interventions, patient/caregiver education/training, strengthening exercise, transfer/mobility retraining, adaptive equipment training, ROM/therapeutic exercise, edema control/reduction, passive ROM/stretching  Therapy Frequency (OT): 5 times/wk  Plan of Care Review  Plan of Care Reviewed With: patient, son  Progress: improving  Outcome Evaluation: Patient asleep on OT/PT arrival this p.m. but easily roused and agreeable to treatment with encouragement.  Sons both present at bedside.  Patient reports fatigue and continued pain in right lower extremity.  Patient educated on increased movement more frequently throughout the day even if only sitting EOB during meals to steadily increase endurance and activity tolerance and assist with pain.  Patient tolerated sitting EOB for grooming ADLs with set up assist and increased time.  Patient mod a x 2 with bed mobility and min a x 2 for STS at edge of bed with FWWx and cues for breathing, sequencing, upright posture and took sidesteps along EOB this date which is noted improvement over last session yesterday.  OT will continue to follow-up with patient and progress as tolerated.     Time Calculation:   Evaluation Complexity (OT)  Review Occupational Profile/Medical/Therapy History Complexity: brief/low complexity  Assessment, Occupational Performance/Identification of Deficit Complexity: 3-5 performance  deficits  Clinical Decision Making Complexity (OT): detailed assessment/moderate complexity  Overall Complexity of Evaluation (OT): moderate complexity     Time Calculation- OT       Row Name 05/03/24 1808             Time Calculation- OT    OT Start Time 1522  -KATIUSKA      OT Stop Time 1553  -KATIUSKA      OT Time Calculation (min) 31 min  -KATIUSKA      Total Timed Code Minutes- OT 31 minute(s)  -KATIUSKA      OT Received On 05/03/24  -KATIUSKA      OT - Next Appointment 05/06/24  -KATIUSKA         Timed Charges    01506 - OT Self Care/Mgmt Minutes 31  -KATIUSKA         Total Minutes    Timed Charges Total Minutes 31  -KATIUSKA       Total Minutes 31  -KATIUSKA                User Key  (r) = Recorded By, (t) = Taken By, (c) = Cosigned By      Initials Name Provider Type    KATIUSKAAda Handy OT Occupational Therapist                  Therapy Charges for Today       Code Description Service Date Service Provider Modifiers Qty    08516842899 HC OT SELF CARE/MGMT/TRAIN EA 15 MIN 5/2/2024 Ada Evans OT GO 2    67611084929 HC OT EVAL MOD COMPLEXITY 3 5/2/2024 Ada Evans OT GO 1    92132524338 HC OT SELF CARE/MGMT/TRAIN EA 15 MIN 5/3/2024 Ada Evans OT GO 2                 Ada Evans OT  5/3/2024

## 2024-05-03 NOTE — PROGRESS NOTES
Continued Stay Note  [facility]     Patient Name: Eve Fung  MRN: [id number]  Today's Date: 5/3/2024    Admit Date: 4/30/2024    Plan: [facility], pending precert   Discharge Plan       Row Name 05/03/24 1545       Plan    Plan [facility] SNF, pending precert    Patient/Family in Agreement with Plan yes    Plan Comments Spoke with pts son regarding d/c planning Tyrese [phone]. 1st choice [facility] 2.[facility] and 3. [facility] in [place]. Per Aisha/[employer] patient is approved pending precert for [facility]. Email sent to Post Acute team to start precert however new PT notes are needed to start cert, notified Catherine/PT who states PT and OT will be seeing patient together this afternoon and she will notify when new notes are in. Plan will be to d/c to [facility] when precert approved. Pt will likely need EMS vs  van transport--at this time non ambulatory. CCP to follow. Partial packet will be in CCP office and pharmacy will be updated for SNF.                   Discharge Codes    No documentation.                 Expected Discharge Date and Time       Expected Discharge Date Expected Discharge Time    May 6, 2024               Reena Donald RN

## 2024-05-04 LAB
ANION GAP SERPL CALCULATED.3IONS-SCNC: 7.9 MMOL/L (ref 5–15)
BASOPHILS # BLD AUTO: 0.02 10*3/MM3 (ref 0–0.2)
BASOPHILS NFR BLD AUTO: 0.3 % (ref 0–1.5)
BH BB BLOOD EXPIRATION DATE: NORMAL
BH BB BLOOD TYPE BARCODE: 6200
BH BB DISPENSE STATUS: NORMAL
BH BB PRODUCT CODE: NORMAL
BH BB UNIT NUMBER: NORMAL
BILIRUB UR QL STRIP: NEGATIVE
BUN SERPL-MCNC: 7 MG/DL (ref 8–23)
BUN/CREAT SERPL: 20 (ref 7–25)
CALCIUM SPEC-SCNC: 8.6 MG/DL (ref 8.6–10.5)
CHLORIDE SERPL-SCNC: 97 MMOL/L (ref 98–107)
CLARITY UR: CLEAR
CO2 SERPL-SCNC: 31.1 MMOL/L (ref 22–29)
COLOR UR: YELLOW
CREAT SERPL-MCNC: 0.35 MG/DL (ref 0.57–1)
CROSSMATCH INTERPRETATION: NORMAL
DEPRECATED RDW RBC AUTO: 43.1 FL (ref 37–54)
EGFRCR SERPLBLD CKD-EPI 2021: 102.8 ML/MIN/1.73
EOSINOPHIL # BLD AUTO: 0.07 10*3/MM3 (ref 0–0.4)
EOSINOPHIL NFR BLD AUTO: 1.2 % (ref 0.3–6.2)
ERYTHROCYTE [DISTWIDTH] IN BLOOD BY AUTOMATED COUNT: 13.7 % (ref 12.3–15.4)
GLUCOSE SERPL-MCNC: 98 MG/DL (ref 65–99)
GLUCOSE UR STRIP-MCNC: NEGATIVE MG/DL
HCT VFR BLD AUTO: 24.8 % (ref 34–46.6)
HCT VFR BLD AUTO: 27.2 % (ref 34–46.6)
HGB BLD-MCNC: 8.3 G/DL (ref 12–15.9)
HGB BLD-MCNC: 9 G/DL (ref 12–15.9)
HGB UR QL STRIP.AUTO: NEGATIVE
IMM GRANULOCYTES # BLD AUTO: 0.03 10*3/MM3 (ref 0–0.05)
IMM GRANULOCYTES NFR BLD AUTO: 0.5 % (ref 0–0.5)
KETONES UR QL STRIP: ABNORMAL
LEUKOCYTE ESTERASE UR QL STRIP.AUTO: NEGATIVE
LYMPHOCYTES # BLD AUTO: 1.01 10*3/MM3 (ref 0.7–3.1)
LYMPHOCYTES NFR BLD AUTO: 17.3 % (ref 19.6–45.3)
MAGNESIUM SERPL-MCNC: 2 MG/DL (ref 1.6–2.4)
MCH RBC QN AUTO: 28.7 PG (ref 26.6–33)
MCHC RBC AUTO-ENTMCNC: 33.5 G/DL (ref 31.5–35.7)
MCV RBC AUTO: 85.8 FL (ref 79–97)
MONOCYTES # BLD AUTO: 0.74 10*3/MM3 (ref 0.1–0.9)
MONOCYTES NFR BLD AUTO: 12.7 % (ref 5–12)
NEUTROPHILS NFR BLD AUTO: 3.97 10*3/MM3 (ref 1.7–7)
NEUTROPHILS NFR BLD AUTO: 68 % (ref 42.7–76)
NITRITE UR QL STRIP: NEGATIVE
NRBC BLD AUTO-RTO: 0 /100 WBC (ref 0–0.2)
PH UR STRIP.AUTO: 8 [PH] (ref 5–8)
PHOSPHATE SERPL-MCNC: 2.3 MG/DL (ref 2.5–4.5)
PLATELET # BLD AUTO: 196 10*3/MM3 (ref 140–450)
PMV BLD AUTO: 10.5 FL (ref 6–12)
POTASSIUM SERPL-SCNC: 4.8 MMOL/L (ref 3.5–5.2)
PROT UR QL STRIP: NEGATIVE
RBC # BLD AUTO: 2.89 10*6/MM3 (ref 3.77–5.28)
SODIUM SERPL-SCNC: 136 MMOL/L (ref 136–145)
SP GR UR STRIP: 1.01 (ref 1–1.03)
UNIT  ABO: NORMAL
UNIT  RH: NORMAL
UROBILINOGEN UR QL STRIP: ABNORMAL
WBC NRBC COR # BLD AUTO: 5.84 10*3/MM3 (ref 3.4–10.8)

## 2024-05-04 PROCEDURE — 83735 ASSAY OF MAGNESIUM: CPT | Performed by: STUDENT IN AN ORGANIZED HEALTH CARE EDUCATION/TRAINING PROGRAM

## 2024-05-04 PROCEDURE — 85025 COMPLETE CBC W/AUTO DIFF WBC: CPT | Performed by: STUDENT IN AN ORGANIZED HEALTH CARE EDUCATION/TRAINING PROGRAM

## 2024-05-04 PROCEDURE — 84100 ASSAY OF PHOSPHORUS: CPT | Performed by: STUDENT IN AN ORGANIZED HEALTH CARE EDUCATION/TRAINING PROGRAM

## 2024-05-04 PROCEDURE — 80048 BASIC METABOLIC PNL TOTAL CA: CPT | Performed by: STUDENT IN AN ORGANIZED HEALTH CARE EDUCATION/TRAINING PROGRAM

## 2024-05-04 PROCEDURE — 85014 HEMATOCRIT: CPT | Performed by: STUDENT IN AN ORGANIZED HEALTH CARE EDUCATION/TRAINING PROGRAM

## 2024-05-04 PROCEDURE — 85018 HEMOGLOBIN: CPT | Performed by: STUDENT IN AN ORGANIZED HEALTH CARE EDUCATION/TRAINING PROGRAM

## 2024-05-04 PROCEDURE — 25010000002 HYDROMORPHONE PER 4 MG: Performed by: STUDENT IN AN ORGANIZED HEALTH CARE EDUCATION/TRAINING PROGRAM

## 2024-05-04 RX ADMIN — HYDROMORPHONE HYDROCHLORIDE 0.5 MG: 1 INJECTION, SOLUTION INTRAMUSCULAR; INTRAVENOUS; SUBCUTANEOUS at 06:14

## 2024-05-04 RX ADMIN — BISACODYL 5 MG: 5 TABLET, COATED ORAL at 08:37

## 2024-05-04 RX ADMIN — Medication 10 ML: at 10:42

## 2024-05-04 RX ADMIN — POLYETHYLENE GLYCOL 3350 17 G: 17 POWDER, FOR SOLUTION ORAL at 20:48

## 2024-05-04 RX ADMIN — PANTOPRAZOLE SODIUM 40 MG: 40 TABLET, DELAYED RELEASE ORAL at 06:13

## 2024-05-04 RX ADMIN — SENNOSIDES AND DOCUSATE SODIUM 2 TABLET: 50; 8.6 TABLET ORAL at 08:37

## 2024-05-04 RX ADMIN — Medication 10 ML: at 20:51

## 2024-05-04 RX ADMIN — OXYCODONE AND ACETAMINOPHEN 1 TABLET: 7.5; 325 TABLET ORAL at 08:37

## 2024-05-04 RX ADMIN — SENNOSIDES AND DOCUSATE SODIUM 2 TABLET: 50; 8.6 TABLET ORAL at 20:48

## 2024-05-04 RX ADMIN — OXYCODONE AND ACETAMINOPHEN 1 TABLET: 7.5; 325 TABLET ORAL at 22:23

## 2024-05-04 RX ADMIN — RIVAROXABAN 10 MG: 10 TABLET, FILM COATED ORAL at 08:37

## 2024-05-04 RX ADMIN — DIBASIC SODIUM PHOSPHATE, MONOBASIC POTASSIUM PHOSPHATE AND MONOBASIC SODIUM PHOSPHATE 2 TABLET: 852; 155; 130 TABLET ORAL at 10:33

## 2024-05-04 RX ADMIN — OXYCODONE AND ACETAMINOPHEN 1 TABLET: 7.5; 325 TABLET ORAL at 16:31

## 2024-05-04 RX ADMIN — OXYCODONE AND ACETAMINOPHEN 1 TABLET: 7.5; 325 TABLET ORAL at 02:45

## 2024-05-04 RX ADMIN — OXYCODONE AND ACETAMINOPHEN 1 TABLET: 7.5; 325 TABLET ORAL at 12:25

## 2024-05-04 RX ADMIN — POLYETHYLENE GLYCOL 3350 17 G: 17 POWDER, FOR SOLUTION ORAL at 08:37

## 2024-05-04 RX ADMIN — DIBASIC SODIUM PHOSPHATE, MONOBASIC POTASSIUM PHOSPHATE AND MONOBASIC SODIUM PHOSPHATE 2 TABLET: 852; 155; 130 TABLET ORAL at 17:32

## 2024-05-04 NOTE — PLAN OF CARE
Goal Outcome Evaluation:  Plan of Care Reviewed With: patient        Progress: no change  Outcome Evaluation: VSS, alert and oriented x4, pain controlled by prn pain medication, 2LNC, up with assist x2, walker, gait belt, cold applied, no issues to report.

## 2024-05-04 NOTE — PLAN OF CARE
Goal Outcome Evaluation:           Progress: improving  Outcome Evaluation: 84 y/o s/POD 2 R femur IM nailing and poly knee exchange. AOX4, [place]. Pt up w/ PT, able to take a few steps, however unable to get to chair. Voiding function intact via PW, awaiting urine collection. BM regimen continued, await occult stool. 1 unit PRBC provided and potassium replaced. PIV, SL. Pain managed via PO and IV meds. Meds given w/ applesauce whole. Needs met, VSS, 2 L O2. Educated pt on transfusion rx. Plan to d/c to [facility] pending pre-cert. WIll CTM.

## 2024-05-04 NOTE — PROGRESS NOTES
Name: Eve Fung ADMIT: 2024   : 1940  PCP: Siddhartha Eve GEORGETTE, RADHA    MRN: [id number] LOS: 4 days   AGE/SEX: 83 y.o. female  ROOM: [facility]     Subjective   Subjective   Patient seen this morning, son present in the room.  No acute events overnight.  Still have no BM, offered suppository however family would like to hold postpositive for now.  Pain is controlled.    Review of Systems   As above  Objective   Objective   Vital Signs  Temp:  [97.6 °F (36.4 °C)-99.9 °F (37.7 °C)] 98.5 °F (36.9 °C)  Heart Rate:  [73-86] 73  Resp:  [14-18] 16  BP: (117-138)/(55-74) 117/55  SpO2:  [95 %-100 %] 96 %  on  Flow (L/min):  [2] 2;   Device (Oxygen Therapy): nasal cannula  Body mass index is 25.48 kg/m².  Physical Exam    General: Alert, laying in bed, no distress,  HEENT: Normocephalic, atraumatic, hard of hearing  CV: Regular rate and rhythm, no murmurs rubs or gallops  Lungs: Clear to auscultation bilaterally, no crackles or wheezes  Abdomen: Soft, nontender, nondistended  Extremities: Right upper extremity dressing in place, clean, intact.  Swelling with increased bruising right lower posterior thigh noted.    Results Review     I reviewed the patient's new clinical results.  Results from last 7 days   Lab Units 24  0530   WBC 10*3/mm3 5.84  --  5.68 8.79 8.55   HEMOGLOBIN g/dL 8.3* 8.3* 7.1* 8.6* 10.8*   PLATELETS 10*3/mm3 196  --  142 168 209     Results from last 7 days   Lab Units 24  0530   SODIUM mmol/L 136 138 135* 136   POTASSIUM mmol/L 4.8 3.4* 4.0 4.8   CHLORIDE mmol/L 97* 103 102 102   CO2 mmol/L 31.1* 29.0 25.4 26.7   BUN mg/dL 7* 7* 11 14   CREATININE mg/dL 0.35* 0.25* 0.38* 0.48*   GLUCOSE mg/dL 98 102* 150* 120*   Estimated Creatinine Clearance: 110.1 mL/min (A) (by C-G formula based on SCr of 0.35 mg/dL (L)).  Results from last 7 days   Lab Units 24  2150   ALBUMIN g/dL  "3.9   BILIRUBIN mg/dL 0.7   ALK PHOS U/L 69   AST (SGOT) U/L 20   ALT (SGPT) U/L 14     Results from last 7 days   Lab Units 05/04/24  0435 05/03/24  0327 05/02/24  0454 05/01/24  0530 04/30/24  2150   CALCIUM mg/dL 8.6 7.7* 8.1* 8.8 9.1   ALBUMIN g/dL  --   --   --   --  3.9   MAGNESIUM mg/dL 2.0  --   --  2.0 1.8   PHOSPHORUS mg/dL 2.3*  --   --  3.3 2.8     No results found for: \"COVID19\"  No results found for: \"HGBA1C\", \"POCGLU\"        XR Femur 2 View Right  Narrative: 2 VIEWS RIGHT FEMUR     HISTORY: Postop right femoral nail     COMPARISON: May 1, 2024     FINDINGS:  The patient has undergone intramedullary rupa fixation of the previously  identified distal femoral diaphyseal fracture. Bony alignment appears  improved. The patient is also status post right knee arthroplasty, and  this hardware appears to be stable in position. There is a suprapatellar  effusion.     Impression: Postoperative findings, as noted above.     This report was finalized on 5/1/2024 10:36 PM by Dr. Uzma Clay M.D on Workstation: [id number]     FL C Arm During Surgery  This procedure was auto-finalized with no dictation required.  XR Femur 2 View Right  XR FEMUR 2 VW RIGHT-     Clinical: Surgical planning, femur fracture     FINDINGS: There is a spiral fracture demonstrated through the distal  right femoral diaphysis extending into the metaphysis with moderate  angulation and fairly significant overlapping of the bone ends with  foreshortening. Fracture line may extend to the femoral component  anteriorly. No soft tissue gas or radiopaque foreign body seen. There is  hip joint narrowing, otherwise within normal limits. Proximal femur is  satisfactory in appearance.     This report was finalized on 5/1/2024 7:58 AM by Dr. Nathan Phillips M.D  on Workstation: [id number]       Scheduled Medications  pantoprazole, 40 mg, Oral, Q AM  phosphorus, 500 mg, Oral, TID  senna-docusate sodium, 2 tablet, Oral, BID   And  polyethylene glycol, " "17 g, Oral, BID  rivaroxaban, 10 mg, Oral, Daily With Breakfast  sodium chloride, 10 mL, Intravenous, Q12H    Infusions  lactated ringers, 9 mL/hr, Last Rate: 9 mL/hr (05/01/24 2100)    Diet  Diet: Regular/House; Fluid Consistency: Thin (IDDSI 0)    I have personally reviewed     [x]  Laboratory   [x]  Microbiology   [x]  Radiology   [x]  EKG/Telemetry  []  Cardiology/Vascular   []  Pathology    []  Records       Assessment/Plan     Active Hospital Problems    Diagnosis  POA    **Closed right hip fracture [S72.001A]  Yes    GERD without esophagitis [K21.9]  Unknown    Other fracture of right femur, initial encounter for closed fracture [S72.8X1A]  Unknown    Degeneration of lumbar intervertebral disc [M51.36]  Yes    Anxiety [F41.9]  Yes    Chronic back pain [M54.9, G89.29]  Yes    S/P TKR (total knee replacement), right [Z96.651]  Not Applicable      Resolved Hospital Problems   No resolved problems to display.       Patient is a 83-year-old female with a history of including but not limited to chronic back pain, GERD, anxiety, total right knee replacement, presented to the outside ED after mechanical fall complaining of right hip pain.  Was found to have right hip fracture and was transferred to [facility] after discussion with orthopedic surgery service  ( Dr. Monte) due to requiring complicated surgery.     Right hip fracture  -Secondary to mechanical fall  -Status post-RIGHT FEMUR INTRAMEDULLARY NAILING RETROGRADE AND RIGHT KNEE POLY LINER EXCHANGE on 5/1/2024   At the patient recommendations\"  DVT PPX: Sequential compression devices, and chemical prophylaxis with Xarelto 10 mg for 6 weeks or longer if minimal ambulation  Post-Op Xray: Hardware in good position. Acceptable bony reduction.   Dressing: Change postoperative day 3 with dry dressing, island dressing if minimal drainage or 4 x 4's, ABD pad, and wrap  Follow-Up: In office 2 to 3 weeks weeks postop, please call the office at [phone]  to " "make appointment.\"  Pending placement to rehab.     Postop blood loss anemia  -Hemoglobin was 11.7, trended down to 7.1 on 05/03  - Hemoccult stool pending  -1 unit of PRBC ordered to be transfused 05/03  -Ordered CT scan of the right lower extremity to evaluate for hematoma due to increased bruising seen on exam.  -Hemoglobin 8.3, appropriately responded to blood transfusion.  Repeat H&H this afternoon ordered.    UTI?  -Patient complained of dysuria/frequent urination  -Urinalysis with no signs of infection  -Symptoms improved, monitor     Anxiety  -Continue home as needed Klonopin        GERD  -On omeprazole outpatient,  -Initiate pantoprazole 40 mg daily     Hypokalemia  Improved.  Monitor.            DVT prophylaxis.  Xarelto  Full code.  Discussed with patient and family.  Discussed with RN  Anticipate discharge to SNU facility.  Likely stable to be discharged in 1-2 days if hemoglobin remains stable and pending CT scan results      Copied text in this note has been reviewed and is accurate as of 05/04/24.         Dictated utilizing Dragon dictation        Clay Sawyer MD  [facility]  05/04/24  15:01 EDT      "

## 2024-05-04 NOTE — PLAN OF CARE
Goal Outcome Evaluation:  Plan of Care Reviewed With: patient   Vss, nvi, dressing c/d/I, voiding per pw, Q2 turn, meds whole in applesauce, phos replaced, prn percocet given for pain, plans to d/c to SNF.     Progress: improving

## 2024-05-04 NOTE — CASE MANAGEMENT/SOCIAL WORK
Continued Stay Note  [facility]     Patient Name: Eve Fung  MRN: [id number]  Today's Date: 5/4/2024    Admit Date: 4/30/2024    Plan: [facility], pending precert   Discharge Plan       Row Name 05/04/24 1055       Plan    Plan Comments Checked [employer] and pre-cert remains pending. Shay GLOVER                   Discharge Codes    No documentation.                 Expected Discharge Date and Time       Expected Discharge Date Expected Discharge Time    May 6, 2024               Shay Paulson RN

## 2024-05-05 LAB
ANION GAP SERPL CALCULATED.3IONS-SCNC: 7.4 MMOL/L (ref 5–15)
BASOPHILS # BLD AUTO: 0.01 10*3/MM3 (ref 0–0.2)
BASOPHILS NFR BLD AUTO: 0.2 % (ref 0–1.5)
BUN SERPL-MCNC: 7 MG/DL (ref 8–23)
BUN/CREAT SERPL: 21.2 (ref 7–25)
CALCIUM SPEC-SCNC: 8.8 MG/DL (ref 8.6–10.5)
CHLORIDE SERPL-SCNC: 99 MMOL/L (ref 98–107)
CO2 SERPL-SCNC: 32.6 MMOL/L (ref 22–29)
CREAT SERPL-MCNC: 0.33 MG/DL (ref 0.57–1)
DEPRECATED RDW RBC AUTO: 44 FL (ref 37–54)
EGFRCR SERPLBLD CKD-EPI 2021: 104.3 ML/MIN/1.73
EOSINOPHIL # BLD AUTO: 0.14 10*3/MM3 (ref 0–0.4)
EOSINOPHIL NFR BLD AUTO: 3.2 % (ref 0.3–6.2)
ERYTHROCYTE [DISTWIDTH] IN BLOOD BY AUTOMATED COUNT: 13.6 % (ref 12.3–15.4)
GLUCOSE SERPL-MCNC: 115 MG/DL (ref 65–99)
HCT VFR BLD AUTO: 27.1 % (ref 34–46.6)
HEMOCCULT STL QL: NEGATIVE
HGB BLD-MCNC: 9.1 G/DL (ref 12–15.9)
IMM GRANULOCYTES # BLD AUTO: 0.01 10*3/MM3 (ref 0–0.05)
IMM GRANULOCYTES NFR BLD AUTO: 0.2 % (ref 0–0.5)
LYMPHOCYTES # BLD AUTO: 0.73 10*3/MM3 (ref 0.7–3.1)
LYMPHOCYTES NFR BLD AUTO: 16.7 % (ref 19.6–45.3)
MCH RBC QN AUTO: 29.4 PG (ref 26.6–33)
MCHC RBC AUTO-ENTMCNC: 33.6 G/DL (ref 31.5–35.7)
MCV RBC AUTO: 87.4 FL (ref 79–97)
MONOCYTES # BLD AUTO: 0.69 10*3/MM3 (ref 0.1–0.9)
MONOCYTES NFR BLD AUTO: 15.8 % (ref 5–12)
NEUTROPHILS NFR BLD AUTO: 2.79 10*3/MM3 (ref 1.7–7)
NEUTROPHILS NFR BLD AUTO: 63.9 % (ref 42.7–76)
NRBC BLD AUTO-RTO: 0 /100 WBC (ref 0–0.2)
PHOSPHATE SERPL-MCNC: 3.8 MG/DL (ref 2.5–4.5)
PLATELET # BLD AUTO: 229 10*3/MM3 (ref 140–450)
PMV BLD AUTO: 9.6 FL (ref 6–12)
POTASSIUM SERPL-SCNC: 4 MMOL/L (ref 3.5–5.2)
RBC # BLD AUTO: 3.1 10*6/MM3 (ref 3.77–5.28)
SODIUM SERPL-SCNC: 139 MMOL/L (ref 136–145)
WBC NRBC COR # BLD AUTO: 4.37 10*3/MM3 (ref 3.4–10.8)

## 2024-05-05 PROCEDURE — 82272 OCCULT BLD FECES 1-3 TESTS: CPT | Performed by: STUDENT IN AN ORGANIZED HEALTH CARE EDUCATION/TRAINING PROGRAM

## 2024-05-05 PROCEDURE — 84100 ASSAY OF PHOSPHORUS: CPT | Performed by: STUDENT IN AN ORGANIZED HEALTH CARE EDUCATION/TRAINING PROGRAM

## 2024-05-05 PROCEDURE — 97116 GAIT TRAINING THERAPY: CPT | Performed by: PHYSICAL THERAPIST

## 2024-05-05 PROCEDURE — 97530 THERAPEUTIC ACTIVITIES: CPT | Performed by: PHYSICAL THERAPIST

## 2024-05-05 PROCEDURE — 85025 COMPLETE CBC W/AUTO DIFF WBC: CPT | Performed by: STUDENT IN AN ORGANIZED HEALTH CARE EDUCATION/TRAINING PROGRAM

## 2024-05-05 PROCEDURE — 97110 THERAPEUTIC EXERCISES: CPT | Performed by: PHYSICAL THERAPIST

## 2024-05-05 PROCEDURE — 80048 BASIC METABOLIC PNL TOTAL CA: CPT | Performed by: STUDENT IN AN ORGANIZED HEALTH CARE EDUCATION/TRAINING PROGRAM

## 2024-05-05 RX ORDER — BISACODYL 10 MG
10 SUPPOSITORY, RECTAL RECTAL ONCE
Status: COMPLETED | OUTPATIENT
Start: 2024-05-05 | End: 2024-05-05

## 2024-05-05 RX ORDER — OXYCODONE HYDROCHLORIDE AND ACETAMINOPHEN 5; 325 MG/1; MG/1
1 TABLET ORAL EVERY 6 HOURS PRN
Status: DISCONTINUED | OUTPATIENT
Start: 2024-05-05 | End: 2024-05-06

## 2024-05-05 RX ADMIN — OXYCODONE AND ACETAMINOPHEN 1 TABLET: 7.5; 325 TABLET ORAL at 10:02

## 2024-05-05 RX ADMIN — OXYCODONE HYDROCHLORIDE AND ACETAMINOPHEN 1 TABLET: 5; 325 TABLET ORAL at 18:09

## 2024-05-05 RX ADMIN — DIBASIC SODIUM PHOSPHATE, MONOBASIC POTASSIUM PHOSPHATE AND MONOBASIC SODIUM PHOSPHATE 2 TABLET: 852; 155; 130 TABLET ORAL at 02:18

## 2024-05-05 RX ADMIN — SENNOSIDES AND DOCUSATE SODIUM 2 TABLET: 50; 8.6 TABLET ORAL at 20:56

## 2024-05-05 RX ADMIN — Medication 10 ML: at 09:01

## 2024-05-05 RX ADMIN — Medication 10 ML: at 20:56

## 2024-05-05 RX ADMIN — RIVAROXABAN 10 MG: 10 TABLET, FILM COATED ORAL at 08:46

## 2024-05-05 RX ADMIN — OXYCODONE AND ACETAMINOPHEN 1 TABLET: 7.5; 325 TABLET ORAL at 06:16

## 2024-05-05 RX ADMIN — PANTOPRAZOLE SODIUM 40 MG: 40 TABLET, DELAYED RELEASE ORAL at 06:10

## 2024-05-05 RX ADMIN — POLYETHYLENE GLYCOL 3350 17 G: 17 POWDER, FOR SOLUTION ORAL at 20:56

## 2024-05-05 RX ADMIN — BISACODYL 10 MG: 10 SUPPOSITORY RECTAL at 08:46

## 2024-05-05 NOTE — PROGRESS NOTES
Name: Eve Fung ADMIT: 2024   : 1940  PCP: Eve Carl APRN    MRN: [id number] LOS: 5 days   AGE/SEX: 83 y.o. female  ROOM: [facility]     Subjective   Subjective   Patient seen this morning, family present in the room.  No acute events overnight, per family pain is improving and she is doing better.  Hemoglobin stable.  Still no BM, denies nausea, vomiting.  Family agreeable for suppository now.    Review of Systems   As above  Objective   Objective   Vital Signs  Temp:  [98.1 °F (36.7 °C)-98.8 °F (37.1 °C)] 98.8 °F (37.1 °C)  Heart Rate:  [71-81] 76  Resp:  [16] 16  BP: (117-143)/(55-74) 139/67  SpO2:  [96 %-100 %] 100 %  on  Flow (L/min):  [2] 2;   Device (Oxygen Therapy): nasal cannula  Body mass index is 25.52 kg/m².  Physical Exam    General: Alert, laying in bed, no distress,  HEENT: Normocephalic, atraumatic, hard of hearing  CV: Regular rate and rhythm, no murmurs   Lungs: Clear to auscultation bilaterally, no crackles or wheezes  Abdomen: Soft, nontender, nondistended  Extremities: Right upper extremity dressing in place, clean, intact.  Swelling and bruising right lower posterior thigh noted, unchanged compared to yesterday.    Results Review     I reviewed the patient's new clinical results.  Results from last 7 days   Lab Units 24  0323 05/04/24  1714 24  0435 24  2133 24  0327 24  0454   WBC 10*3/mm3 4.37  --  5.84  --  5.68 8.79   HEMOGLOBIN g/dL 9.1* 9.0* 8.3* 8.3* 7.1* 8.6*   PLATELETS 10*3/mm3 229  --  196  --  142 168     Results from last 7 days   Lab Units 24  0323 24  0435 24  03224  0454   SODIUM mmol/L 139 136 138 135*   POTASSIUM mmol/L 4.0 4.8 3.4* 4.0   CHLORIDE mmol/L 99 97* 103 102   CO2 mmol/L 32.6* 31.1* 29.0 25.4   BUN mg/dL 7* 7* 7* 11   CREATININE mg/dL 0.33* 0.35* 0.25* 0.38*   GLUCOSE mg/dL 115* 98 102* 150*   Estimated Creatinine Clearance: 116.9 mL/min (A) (by C-G formula based on SCr of 0.33 mg/dL  "(L)).  Results from last 7 days   Lab Units 04/30/24  2150   ALBUMIN g/dL 3.9   BILIRUBIN mg/dL 0.7   ALK PHOS U/L 69   AST (SGOT) U/L 20   ALT (SGPT) U/L 14     Results from last 7 days   Lab Units 05/05/24  0323 05/04/24  0435 05/03/24  0327 05/02/24  0454 05/01/24  0530 04/30/24  2150   CALCIUM mg/dL 8.8 8.6 7.7* 8.1* 8.8 9.1   ALBUMIN g/dL  --   --   --   --   --  3.9   MAGNESIUM mg/dL  --  2.0  --   --  2.0 1.8   PHOSPHORUS mg/dL 3.8 2.3*  --   --  3.3 2.8     No results found for: \"COVID19\"  No results found for: \"HGBA1C\", \"POCGLU\"        XR Femur 2 View Right  Narrative: 2 VIEWS RIGHT FEMUR     HISTORY: Postop right femoral nail     COMPARISON: May 1, 2024     FINDINGS:  The patient has undergone intramedullary rupa fixation of the previously  identified distal femoral diaphyseal fracture. Bony alignment appears  improved. The patient is also status post right knee arthroplasty, and  this hardware appears to be stable in position. There is a suprapatellar  effusion.     Impression: Postoperative findings, as noted above.     This report was finalized on 5/1/2024 10:36 PM by Dr. Uzma Clay M.D on Workstation: [id number]     FL C Arm During Surgery  This procedure was auto-finalized with no dictation required.  XR Femur 2 View Right  XR FEMUR 2 VW RIGHT-     Clinical: Surgical planning, femur fracture     FINDINGS: There is a spiral fracture demonstrated through the distal  right femoral diaphysis extending into the metaphysis with moderate  angulation and fairly significant overlapping of the bone ends with  foreshortening. Fracture line may extend to the femoral component  anteriorly. No soft tissue gas or radiopaque foreign body seen. There is  hip joint narrowing, otherwise within normal limits. Proximal femur is  satisfactory in appearance.     This report was finalized on 5/1/2024 7:58 AM by Dr. Nathan Phillips M.D  on Workstation: [id number]       Scheduled Medications  bisacodyl, 10 mg, Rectal, " "Once  pantoprazole, 40 mg, Oral, Q AM  senna-docusate sodium, 2 tablet, Oral, BID   And  polyethylene glycol, 17 g, Oral, BID  rivaroxaban, 10 mg, Oral, Daily With Breakfast  sodium chloride, 10 mL, Intravenous, Q12H    Infusions  lactated ringers, 9 mL/hr, Last Rate: 9 mL/hr (05/01/24 2100)    Diet  Diet: Regular/House; Fluid Consistency: Thin (IDDSI 0)    I have personally reviewed     [x]  Laboratory   []  Microbiology   []  Radiology   []  EKG/Telemetry  []  Cardiology/Vascular   []  Pathology    []  Records       Assessment/Plan     Active Hospital Problems    Diagnosis  POA   • **Closed right hip fracture [S72.001A]  Yes   • GERD without esophagitis [K21.9]  Unknown   • Other fracture of right femur, initial encounter for closed fracture [S72.8X1A]  Unknown   • Degeneration of lumbar intervertebral disc [M51.36]  Yes   • Anxiety [F41.9]  Yes   • Chronic back pain [M54.9, G89.29]  Yes   • S/P TKR (total knee replacement), right [Z96.651]  Not Applicable      Resolved Hospital Problems   No resolved problems to display.       Patient is a 83-year-old female with a history of including but not limited to chronic back pain, GERD, anxiety, total right knee replacement, presented to the outside ED after mechanical fall complaining of right hip pain.  Was found to have right hip fracture and was transferred to [facility] after discussion with orthopedic surgery service  ( Dr. Monte) due to requiring complicated surgery.     Right hip fracture  -Secondary to mechanical fall  -Status post-RIGHT FEMUR INTRAMEDULLARY NAILING RETROGRADE AND RIGHT KNEE POLY LINER EXCHANGE on 5/1/2024   At the patient recommendations\"  DVT PPX: Sequential compression devices, and chemical prophylaxis with Xarelto 10 mg for 6 weeks or longer if minimal ambulation  Post-Op Xray: Hardware in good position. Acceptable bony reduction.   Dressing: Change postoperative day 3 with dry dressing, island dressing if minimal drainage or 4 x " "4's, ABD pad, and wrap  Follow-Up: In office 2 to 3 weeks weeks postop, please call the office at [phone]  to make appointment.\"  Pending placement to rehab.     Postop blood loss anemia  -Hemoglobin was 11.7 on admission, trended down to 7.1 on 05/03  -Hemoccult stool still pending  -1 unit of PRBC ordered to be transfused 05/03  -Plan was for repeat CT skin of the right lower extremity to evaluate for hematoma, however hemoglobin remained stable, and bruising unchanged, low suspicion for hematoma currently.  -Monitor CBC daily  -Hemoglobin 9.1    Constipation  -Continue docusate senna 2 tabs twice daily, MiraLAX twice daily  -History ordered      UTI?  -Patient complained of dysuria/frequent urination  -Urinalysis with no signs of infection  -Symptoms resolved     Anxiety  -Continue home as needed Klonopin        GERD  -On omeprazole outpatient,  -Initiate pantoprazole 40 mg daily     Hypokalemia  Improved.  Monitor.            DVT prophylaxis.  Xarelto  Full code.  Discussed with patient and family.  Discussed with RN  Anticipate discharge to SNU facility.        Copied text in this note has been reviewed and is accurate as of 05/05/24.         Dictated utilizing Dragon dictation        Clay Sawyer MD  [facility]  05/05/24  08:16 EDT      "

## 2024-05-05 NOTE — PLAN OF CARE
Goal Outcome Evaluation:  Plan of Care Reviewed With: patient        Progress: no change  Outcome Evaluation: Patient A & O. Makes needs known. Sat up in chair earlier. Up with assist x 1. PRN pain meds given as ordered. No s/s of distress noted.

## 2024-05-05 NOTE — PLAN OF CARE
Goal Outcome Evaluation:  Plan of Care Reviewed With: patient, son           Outcome Evaluation: Pt was in bed. Reports improved pain. Pt completed hip exercises independently with verbal cues x 10 reps. Pt  demonstrated improved activity tolerance and ROM today. She stood and walker a short distance with min A and Rwx. Pt's sons were present and very engaged in therapy session. Pt and sons educated on HEP, importance of increased mobility. Encouraged pt to get up to BSC or bathoom with nursing staff and up to chair for meals. Pt is progressing and is motivated to return to [facility]. She will need SNF placement to improve mobility and independence before returing home.      Anticipated Discharge Disposition (PT): skilled nursing facility, inpatient rehabilitation facility

## 2024-05-05 NOTE — THERAPY TREATMENT NOTE
Patient Name: Eve Fung  : 1940    MRN: [id number]                              Today's Date: 2024       Admit Date: 2024    Visit Dx:     ICD-10-CM ICD-9-CM   1. Other fracture of right femur, initial encounter for closed fracture  S72.8X1A 821.00     Patient Active Problem List   Diagnosis    Osteoarthritis of knees, bilateral    Carpal tunnel syndrome of left wrist    Pain of right hip joint    S/P TKR (total knee replacement), right    Arthritis of carpometacarpal (CMC) joint of right thumb    Pain in limb    Degeneration of lumbar intervertebral disc    Chronic back pain    Anxiety    Osteoarthritis of carpometacarpal (CMC) joint of left thumb    Greater trochanteric bursitis of right hip    Bilateral wrist pain    Primary osteoarthritis of first carpometacarpal joint of right hand    Greater trochanteric bursitis of left hip    Closed right hip fracture    GERD without esophagitis    Other fracture of right femur, initial encounter for closed fracture     Past Medical History:   Diagnosis Date    Osteoporosis      Past Surgical History:   Procedure Laterality Date    APPENDECTOMY      CHOLECYSTECTOMY      FEMUR IM NAILING RETROGRADE Right 2024    Procedure: RIGHT FEMUR INTRAMEDULLARY NAILING RETROGRADE AND RIGHT KNEE POLY LINER EXCHANGE;  Surgeon: Luis Rivas MD;  Location: [facility];  Service: Orthopedics;  Laterality: Right;    HYSTERECTOMY        General Information       Row Name 24 1628          Physical Therapy Time and Intention    Document Type therapy note (daily note)  -YAZAN     Mode of Treatment individual therapy;physical therapy  -YAZAN               User Key  (r) = Recorded By, (t) = Taken By, (c) = Cosigned By      Initials Name Provider Type    Misty Wolf PT Physical Therapist                   Mobility       Row Name 24 1627          Bed Mobility    Supine-Sit [place] (Bed Mobility) minimum assist (75% patient effort);verbal cues   -     Assistive Device (Bed Mobility) bed rails  -       Row Name 05/05/24 1628          Sit-Stand Transfer    Sit-Stand [place] (Transfers) minimum assist (75% patient effort)  -     Assistive Device (Sit-Stand Transfers) walker, front-wheeled  -       Row Name 05/05/24 1628          Gait/Stairs (Locomotion)    [place] Level (Gait) minimum assist (75% patient effort)  -     Assistive Device (Gait) walker, front-wheeled  -     Distance in Feet (Gait) 12  -KH     Deviations/Abnormal Patterns (Gait) antalgic;stride length decreased;gait speed decreased  -     Right Sided Gait Deviations weight shift ability decreased  -               User Key  (r) = Recorded By, (t) = Taken By, (c) = Cosigned By      Initials Name Provider Type    Misty Wolf PT Physical Therapist                   Obj/Interventions       [facility] Name 05/05/24 1629          Motor Skills    Therapeutic Exercise --  hip protocol x 10 reps with no assist, verbal cues  -               User Key  (r) = Recorded By, (t) = Taken By, (c) = Cosigned By      Initials Name Provider Type    Misty Wolf PT Physical Therapist                   Goals/Plan    No documentation.                  Clinical Impression       Row Name 05/05/24 1630          Pain    Pretreatment Pain Rating 4/10  -     Posttreatment Pain Rating 5/10  -     Pain Location - Side/Orientation Right  -     Pain Location lower  -     Pain Location - extremity  -       Row Name 05/05/24 1630          Plan of Care Review    Plan of Care Reviewed With patient;son  -     Outcome Evaluation Pt was in bed. Reports improved pain. Pt completed hip exercises independently with verbal cues x 10 reps. Pt  demonstrated improved activity tolerance and ROM today. She stood and walker a short distance with min A and Rwx. Pt's sons were present and very engaged in therapy session. Pt and sons educated on HEP, importance of increased mobility.  Encouraged pt to get up to BSC or bathoom with nursing staff and up to chair for meals. Pt is progressing and is motivated to return to [facility]. She will need SNF placement to improve mobility and independence before returing home.  -       Row Name 05/05/24 1630          Therapy Assessment/Plan (PT)    Therapy Frequency (PT) 6 times/wk  -       Row Name 05/05/24 1630          Positioning and Restraints    Pre-Treatment Position in bed  -KH     Post Treatment Position chair  -KH     In Chair reclined;call light within reach;encouraged to call for assist;exit alarm on;with family/caregiver;notified nsg  -               User Key  (r) = Recorded By, (t) = Taken By, (c) = Cosigned By      Initials Name Provider Type    Misty Wolf, EMIR Physical Therapist                   Outcome Measures       Row Name 05/05/24 1634 05/05/24 0945       How much help from another person do you currently need...    Turning from your back to your side while in flat bed without using bedrails? 3  -KH 2  -KM    Moving from lying on back to sitting on the side of a flat bed without bedrails? 2  -KH 2  -KM    Moving to and from a bed to a chair (including a wheelchair)? 3  -KH 2  -KM    Standing up from a chair using your arms (e.g., wheelchair, bedside chair)? 3  -KH 3  -KM    Climbing 3-5 steps with a railing? 2  -KH 1  -KM    To walk in hospital room? 3  -KH 2  -KM    AM-PAC 6 Clicks Score (PT) 16  -KH 12  -KM    Highest Level of Mobility Goal 5 --> Static standing  -KH 4 --> Transfer to chair/commode  -KM              User Key  (r) = Recorded By, (t) = Taken By, (c) = Cosigned By      Initials Name Provider Type    Misty Wolf, EMIR Physical Therapist    Isabel Luevano, RN Registered Nurse                                 Physical Therapy Education       Title: PT OT SLP Therapies (In Progress)       Topic: Physical Therapy (Not Started)       Point: Mobility training (Not Started)       Learner Progress:   Not documented in this visit.              Point: Home exercise program (Not Started)       Learner Progress:  Not documented in this visit.              Point: Body mechanics (Not Started)       Learner Progress:  Not documented in this visit.              Point: Precautions (Not Started)       Learner Progress:  Not documented in this visit.                                  PT Recommendation and Plan  Planned Therapy Interventions (PT): balance training, bed mobility training, gait training, home exercise program, ROM (range of motion), patient/family education, stair training, strengthening, stretching, transfer training  Plan of Care Reviewed With: patient, son  Outcome Evaluation: Pt was in bed. Reports improved pain. Pt completed hip exercises independently with verbal cues x 10 reps. Pt  demonstrated improved activity tolerance and ROM today. She stood and walker a short distance with min A and Rwx. Pt's sons were present and very engaged in therapy session. Pt and sons educated on HEP, importance of increased mobility. Encouraged pt to get up to BSC or bathoom with nursing staff and up to chair for meals. Pt is progressing and is motivated to return to [facility]. She will need SNF placement to improve mobility and independence before returing home.     Time Calculation:         PT Charges       Row Name 05/05/24 1634             Time Calculation    Start Time 1100  -KH      Stop Time 1148  -KH      Time Calculation (min) 48 min  -KH      PT Received On 05/05/24  -KH      PT - Next Appointment 05/06/24  -         Time Calculation- PT    Total Timed Code Minutes- PT 43 minute(s)  -KH         Timed Charges    19328 - PT Therapeutic Exercise Minutes 20  -KH      21595 - Gait Training Minutes  10  -KH      33430 - PT Therapeutic Activity Minutes 13  -KH         Total Minutes    Timed Charges Total Minutes 43  -KH       Total Minutes 43  -KH                User Key  (r) = Recorded By, (t) = Taken By, (c) = Cosigned  By      Initials Name Provider Type    Misty Wolf, PT Physical Therapist                  Therapy Charges for Today       Code Description Service Date Service Provider Modifiers Qty    85133761377 HC PT THER PROC EA 15 MIN 5/5/2024 Misty Goss, PT GP 1    69519788468 HC GAIT TRAINING EA 15 MIN 5/5/2024 Misty Goss, PT GP 1    66581998351 HC PT THERAPEUTIC ACT EA 15 MIN 5/5/2024 Misty Goss, PT GP 1            PT G-Codes  Outcome Measure Options: AM-PAC 6 Clicks Daily Activity (OT)  AM-PAC 6 Clicks Score (PT): 16  AM-PAC 6 Clicks Score (OT): 14  PT Discharge Summary  Anticipated Discharge Disposition (PT): skilled nursing facility, inpatient rehabilitation facility    Misty Goss, PT  5/5/2024

## 2024-05-05 NOTE — PLAN OF CARE
Goal Outcome Evaluation:  Plan of Care Reviewed With: patient        Progress: improving  Outcome Evaluation: VSS, alert and oriented x4, 2LNC, pain controlled by prn pain medication, family stated last night that they feel she is doing much better since the surgery, awaiting precert for rehab. No issues to report.

## 2024-05-06 LAB
ANION GAP SERPL CALCULATED.3IONS-SCNC: 6 MMOL/L (ref 5–15)
BASOPHILS # BLD AUTO: 0.01 10*3/MM3 (ref 0–0.2)
BASOPHILS NFR BLD AUTO: 0.3 % (ref 0–1.5)
BUN SERPL-MCNC: 6 MG/DL (ref 8–23)
BUN/CREAT SERPL: 20.7 (ref 7–25)
CALCIUM SPEC-SCNC: 8.8 MG/DL (ref 8.6–10.5)
CHLORIDE SERPL-SCNC: 101 MMOL/L (ref 98–107)
CO2 SERPL-SCNC: 32 MMOL/L (ref 22–29)
CREAT SERPL-MCNC: 0.29 MG/DL (ref 0.57–1)
DEPRECATED RDW RBC AUTO: 46.6 FL (ref 37–54)
EGFRCR SERPLBLD CKD-EPI 2021: 107.6 ML/MIN/1.73
EOSINOPHIL # BLD AUTO: 0.15 10*3/MM3 (ref 0–0.4)
EOSINOPHIL NFR BLD AUTO: 3.9 % (ref 0.3–6.2)
ERYTHROCYTE [DISTWIDTH] IN BLOOD BY AUTOMATED COUNT: 14.2 % (ref 12.3–15.4)
GLUCOSE SERPL-MCNC: 99 MG/DL (ref 65–99)
HCT VFR BLD AUTO: 29.3 % (ref 34–46.6)
HGB BLD-MCNC: 9.7 G/DL (ref 12–15.9)
IMM GRANULOCYTES # BLD AUTO: 0.01 10*3/MM3 (ref 0–0.05)
IMM GRANULOCYTES NFR BLD AUTO: 0.3 % (ref 0–0.5)
LYMPHOCYTES # BLD AUTO: 0.79 10*3/MM3 (ref 0.7–3.1)
LYMPHOCYTES NFR BLD AUTO: 20.6 % (ref 19.6–45.3)
MCH RBC QN AUTO: 29.6 PG (ref 26.6–33)
MCHC RBC AUTO-ENTMCNC: 33.1 G/DL (ref 31.5–35.7)
MCV RBC AUTO: 89.3 FL (ref 79–97)
MONOCYTES # BLD AUTO: 0.68 10*3/MM3 (ref 0.1–0.9)
MONOCYTES NFR BLD AUTO: 17.8 % (ref 5–12)
NEUTROPHILS NFR BLD AUTO: 2.19 10*3/MM3 (ref 1.7–7)
NEUTROPHILS NFR BLD AUTO: 57.1 % (ref 42.7–76)
NRBC BLD AUTO-RTO: 0 /100 WBC (ref 0–0.2)
PLATELET # BLD AUTO: 262 10*3/MM3 (ref 140–450)
PMV BLD AUTO: 9.4 FL (ref 6–12)
POTASSIUM SERPL-SCNC: 4.2 MMOL/L (ref 3.5–5.2)
RBC # BLD AUTO: 3.28 10*6/MM3 (ref 3.77–5.28)
SODIUM SERPL-SCNC: 139 MMOL/L (ref 136–145)
WBC NRBC COR # BLD AUTO: 3.83 10*3/MM3 (ref 3.4–10.8)

## 2024-05-06 PROCEDURE — 80048 BASIC METABOLIC PNL TOTAL CA: CPT | Performed by: STUDENT IN AN ORGANIZED HEALTH CARE EDUCATION/TRAINING PROGRAM

## 2024-05-06 PROCEDURE — 85025 COMPLETE CBC W/AUTO DIFF WBC: CPT | Performed by: STUDENT IN AN ORGANIZED HEALTH CARE EDUCATION/TRAINING PROGRAM

## 2024-05-06 RX ORDER — OXYCODONE HYDROCHLORIDE AND ACETAMINOPHEN 5; 325 MG/1; MG/1
1 TABLET ORAL EVERY 4 HOURS PRN
Status: DISCONTINUED | OUTPATIENT
Start: 2024-05-06 | End: 2024-05-07 | Stop reason: HOSPADM

## 2024-05-06 RX ADMIN — PANTOPRAZOLE SODIUM 40 MG: 40 TABLET, DELAYED RELEASE ORAL at 06:24

## 2024-05-06 RX ADMIN — RIVAROXABAN 10 MG: 10 TABLET, FILM COATED ORAL at 09:08

## 2024-05-06 RX ADMIN — OXYCODONE HYDROCHLORIDE AND ACETAMINOPHEN 1 TABLET: 5; 325 TABLET ORAL at 00:11

## 2024-05-06 RX ADMIN — OXYCODONE HYDROCHLORIDE AND ACETAMINOPHEN 1 TABLET: 5; 325 TABLET ORAL at 12:20

## 2024-05-06 RX ADMIN — Medication 10 ML: at 21:27

## 2024-05-06 RX ADMIN — Medication 10 ML: at 09:06

## 2024-05-06 RX ADMIN — OXYCODONE AND ACETAMINOPHEN 1 TABLET: 5; 325 TABLET ORAL at 21:26

## 2024-05-06 RX ADMIN — OXYCODONE HYDROCHLORIDE AND ACETAMINOPHEN 1 TABLET: 5; 325 TABLET ORAL at 06:24

## 2024-05-06 RX ADMIN — OXYCODONE AND ACETAMINOPHEN 1 TABLET: 5; 325 TABLET ORAL at 16:36

## 2024-05-06 NOTE — PLAN OF CARE
Goal Outcome Evaluation:  Plan of Care Reviewed With: patient      Vss, nvi, dressing c/d/I, voiding per bsc, assist x1, prn percocet given for pain with noted relief, plans to d/c to SNF tomorrow at 11:30am via wheelchair van, educated on bp meds and monitoring.   Progress: improving

## 2024-05-06 NOTE — CASE MANAGEMENT/SOCIAL WORK
Post-Acute Authorization Submission      Post Acute Pre-Cert Documentation  Request Submitted by Facility - Type:: Hospital  Post-Acute Authorization Type Submitted:: SNF  Date Post Acute Pre-Cert Inititated per Facility: 05/03/24  Accepting Facility: [facility] Discharge Date Requested: 05/04/24  All Clinicals Submitted?: Yes  Had Accepting Facility at Time of Submission: Yes  Response Received from Insurance?: P2P Requested  Action Taken by Requesting Facility:: Additional Clinicals Submitted (MOST RECENT PT NOTE (5/5/24) UPLOADED TO WEB PORTAL.)  Response Communicated to::   Authorization Number:: PENDING [id number]  Post Acute Pre-Cert Initiated Comment: LAISHA Arroyo, PCT

## 2024-05-06 NOTE — CASE MANAGEMENT/SOCIAL WORK
Post-Acute Authorization Submission      Post Acute Pre-Cert Documentation  Request Submitted by Facility - Type:: Hospital  Post-Acute Authorization Type Submitted:: SNF  Date Post Acute Pre-Cert Inititated per Facility: 05/03/24  Accepting Facility: [facility] Discharge Date Requested: 05/04/24  All Clinicals Submitted?: Yes  Had Accepting Facility at Time of Submission: Yes  Response Received from Insurance?: P2P Requested  Response Communicated to::   Authorization Number:: [id number]  Post Acute Pre-Cert Initiated Comment: LAISHA Arroyo, PCT

## 2024-05-06 NOTE — PROGRESS NOTES
Name: Eve Fung ADMIT: 2024   : 1940  PCP: Siddhartha Eve PALOMINO, RADHA    MRN: [id number] LOS: 6 days   AGE/SEX: 83 y.o. female  ROOM: [facility]     Subjective   Subjective   No chief complaint on file.    Pain better controlled.  Not reporting any chest pain shortness of breath nausea vomiting or abdominal pain.     Objective   Objective   Vital Signs  Temp:  [97.7 °F (36.5 °C)-99.1 °F (37.3 °C)] 98.2 °F (36.8 °C)  Heart Rate:  [70-80] 78  Resp:  [16] 16  BP: (129-145)/(67-84) 133/67  SpO2:  [94 %-100 %] 98 %  on  Flow (L/min):  [2] 2;   Device (Oxygen Therapy): nasal cannula  Body mass index is 25.52 kg/m².    Physical Exam  Vitals and nursing note reviewed.   Constitutional:       General: She is not in acute distress.     Appearance: She is not diaphoretic.   Cardiovascular:      Rate and Rhythm: Normal rate and regular rhythm.      Pulses: Normal pulses.   Pulmonary:      Effort: Pulmonary effort is normal.      Breath sounds: No wheezing.   Abdominal:      General: There is no distension.      Palpations: Abdomen is soft.      Tenderness: There is no abdominal tenderness. There is no guarding or rebound.   Musculoskeletal:         General: Tenderness present.      Comments: Hip dressed   Skin:     General: Skin is warm and dry.   Neurological:      Mental Status: She is alert. Mental status is at baseline.   Psychiatric:         Mood and Affect: Mood normal.         Behavior: Behavior normal.       Results Review  I reviewed the patient's new clinical results.    Results from last 7 days   Lab Units 24  0405 24  0323 05/04/24  1714 24  0435 24  2133 24  0327   WBC 10*3/mm3 3.83 4.37  --  5.84  --  5.68   HEMOGLOBIN g/dL 9.7* 9.1* 9.0* 8.3*   < > 7.1*   PLATELETS 10*3/mm3 262 229  --  196  --  142    < > = values in this interval not displayed.     Results from last 7 days   Lab Units 24  0405 24  0323 05/04/24  0435 24  0327   SODIUM mmol/L 139 139  "136 138   POTASSIUM mmol/L 4.2 4.0 4.8 3.4*   CHLORIDE mmol/L 101 99 97* 103   CO2 mmol/L 32.0* 32.6* 31.1* 29.0   BUN mg/dL 6* 7* 7* 7*   CREATININE mg/dL 0.29* 0.33* 0.35* 0.25*   GLUCOSE mg/dL 99 115* 98 102*   EGFR mL/min/1.73 107.6 104.3 102.8 111.5     Results from last 7 days   Lab Units 04/30/24  2150   ALBUMIN g/dL 3.9   BILIRUBIN mg/dL 0.7   ALK PHOS U/L 69   AST (SGOT) U/L 20   ALT (SGPT) U/L 14     Results from last 7 days   Lab Units 05/06/24  0405 05/05/24  0323 05/04/24  0435 05/03/24  0327 05/02/24  0454 05/01/24  0530 04/30/24  2150   CALCIUM mg/dL 8.8 8.8 8.6 7.7*   < > 8.8 9.1   ALBUMIN g/dL  --   --   --   --   --   --  3.9   MAGNESIUM mg/dL  --   --  2.0  --   --  2.0 1.8   PHOSPHORUS mg/dL  --  3.8 2.3*  --   --  3.3 2.8    < > = values in this interval not displayed.         No results found for: \"HGBA1C\", \"POCGLU\"    No radiology results for the last day    I have personally reviewed all medications:  Scheduled Medications  pantoprazole, 40 mg, Oral, Q AM  senna-docusate sodium, 2 tablet, Oral, BID   And  polyethylene glycol, 17 g, Oral, BID  rivaroxaban, 10 mg, Oral, Daily With Breakfast  sodium chloride, 10 mL, Intravenous, Q12H      Infusions  lactated ringers, 9 mL/hr, Last Rate: 9 mL/hr (05/01/24 2100)      Diet  Diet: Regular/House; Fluid Consistency: Thin (IDDSI 0)    I have personally reviewed:  [x]  Laboratory   []  Microbiology   [x]  Radiology   [x]  EKG/Telemetry  []  Cardiology/Vascular   []  Pathology    [x]  Records       Assessment/Plan     Active Hospital Problems    Diagnosis  POA    **Closed right hip fracture [S72.001A]  Yes    GERD without esophagitis [K21.9]  Unknown    Other fracture of right femur, initial encounter for closed fracture [S72.8X1A]  Unknown    Degeneration of lumbar intervertebral disc [M51.36]  Yes    Anxiety [F41.9]  Yes    Chronic back pain [M54.9, G89.29]  Yes    S/P TKR (total knee replacement), right [Z96.651]  Not Applicable      Resolved Hospital " Problems   No resolved problems to display.       83 y.o. female admitted with Closed right hip fracture.    Right femur fracture: Status post retrograde intramedullary nailing of right distal femur fracture, revision of right total knee replacement, polyethylene liner exchange 05/01.  Prophylaxis with Xarelto 10 mg for 6 weeks or longer if minimally ambulatory.  Follow-up with orthopedic surgery in clinic in 2 to 3 weeks.  ABLA/DUC: FOBT was negative.  Hemoglobin stabilized.  Iron saturation of 9% with an elevated ferritin.  Constipation: Improved.  Continue bowel regimen.  Dysuria: Urinalysis noninfectious.  Symptoms improved.  GERD: PPI  PPX: Xarelto as above  Disposition: SNF/pending pre-CERT    Expected Discharge Date: 5/6/2024; Expected Discharge Time:  9:00 AM     Oziel Zee MD  [facility]  05/06/24  14:05 EDT    Dictated portions of note using Dragon dictation software.  Copied text in this note has been reviewed by me and remains accurate as of 05/06/24

## 2024-05-06 NOTE — CASE MANAGEMENT/SOCIAL WORK
Continued Stay Note  [facility]     Patient Name: Eve Fung  MRN: [id number]  Today's Date: 5/6/2024    Admit Date: 4/30/2024    Plan: [facility]- waiting on pre-cert   Discharge Plan       Row Name 05/06/24 1138       Plan    Plan [facility]- waiting on pre-cert    Patient/Family in Agreement with Plan yes    Plan Comments [employer] pre-cert still pending for [facility]. CCP will follow.                   Discharge Codes    No documentation.                 Expected Discharge Date and Time       Expected Discharge Date Expected Discharge Time    May 6, 2024               Jane Dickey RN

## 2024-05-06 NOTE — PLAN OF CARE
Goal Outcome Evaluation:  Plan of Care Reviewed With: patient        Progress: improving     VSS. Pain managed with prn pain meds. All needs met. WCTM,

## 2024-05-06 NOTE — CASE MANAGEMENT/SOCIAL WORK
Continued Stay Note  [facility]     Patient Name: Eve Fung  MRN: [id number]  Today's Date: 5/6/2024    Admit Date: 4/30/2024    Plan: [facility]- pre-cert obtained- [employer] w/c van booked for 5/7 @ 1130.   Discharge Plan       Row Name 05/06/24 1542       Plan    Plan [facility]- pre-cert obtained- [employer] w/c van booked for 5/7 @ 1130.    Patient/Family in Agreement with Plan yes    Plan Comments P2P completed and pre-cert approved for patient to go to [facility]. [employer] w/c van booked for Tuesday 5/7 @ 1130. Transfer packet in cubbie . Patient and family updated and they are agreeable.                   Discharge Codes    No documentation.                 Expected Discharge Date and Time       Expected Discharge Date Expected Discharge Time    May 6, 2024               Jane Dickey RN

## 2024-05-06 NOTE — SIGNIFICANT NOTE
05/06/24 1706   Post Acute Pre-Cert Documentation   Request Submitted by Facility - Type: Hospital   Post-Acute Authorization Type Submitted: SNF   Date Post Acute Pre-Cert Inititated per Facility 05/03/24   Date Post Acute Pre-Cert Completed 05/06/24   Accepting Facility [facility] Discharge Date Requested 05/04/24   All Clinicals Submitted? Yes   Had Accepting Facility at Time of Submission Yes   Response Received from Insurance? Approval   Action Taken by Requesting Facility: P2P Completed   Response Communicated to: ;Accepting Facility Liaison;Accepting Facility Auth Department   Authorization Number: [id number]   Post Acute Pre-Cert Initiated Comment LAISHA KNAPP

## 2024-05-06 NOTE — CASE MANAGEMENT/SOCIAL WORK
Post-Acute Authorization Submission      Post Acute Pre-Cert Documentation  Request Submitted by Facility - Type:: Hospital  Post-Acute Authorization Type Submitted:: SNF  Date Post Acute Pre-Cert Inititated per Facility: 05/03/24  Date Post Acute Pre-Cert Completed: 05/06/24  Accepting Facility: [facility] Discharge Date Requested: 05/04/24  All Clinicals Submitted?: Yes  Had Accepting Facility at Time of Submission: Yes  Response Received from Insurance?: Approval  Action Taken by Requesting Facility:: P2P Completed  Response Communicated to:: , Accepting Facility Liaison, Accepting Facility Auth Department  Authorization Number:: [id number]  Post Acute Pre-Cert Initiated Comment: VALID TO ADMIT TO [facility] 11:59PM ON 5/9/24.              Rosales Arroyo, PCT

## 2024-05-06 NOTE — CASE MANAGEMENT/SOCIAL WORK
Continued Stay Note  [facility]     Patient Name: Eve Fung  MRN: [id number]  Today's Date: 5/6/2024    Admit Date: 4/30/2024    [facility]- pre-cert obtained- [employer] w[facility] van booked for 5/7 @ 1130.   Discharge Plan       Row Name 05/06/24 1609       Plan    Plan Comments Nurse and MD updated via secure chat.      Row Name 05/06/24 3107       Plan    [facility]- [facility] w[facility] booked for 5/7 @ 1130.    Patient/Family in Agreement with Plan yes    Plan Comments P2P completed and pre-cert approved for patient to go to [facility]. [employer] [facility] van booked for Tuesday 5/7 @ 1130. Transfer packet in cubbie . Patient and family updated and they are agreeable.                   Discharge Codes    No documentation.                 Expected Discharge Date and Time       Expected Discharge Date Expected Discharge Time    May 6, 2024               Jane Dickey RN

## 2024-05-07 VITALS
HEIGHT: 62 IN | BODY MASS INDEX: 25.68 KG/M2 | RESPIRATION RATE: 16 BRPM | OXYGEN SATURATION: 98 % | HEART RATE: 75 BPM | SYSTOLIC BLOOD PRESSURE: 134 MMHG | WEIGHT: 139.55 LBS | DIASTOLIC BLOOD PRESSURE: 71 MMHG | TEMPERATURE: 98 F

## 2024-05-07 LAB
ANION GAP SERPL CALCULATED.3IONS-SCNC: 6 MMOL/L (ref 5–15)
BUN SERPL-MCNC: 8 MG/DL (ref 8–23)
BUN/CREAT SERPL: 22.9 (ref 7–25)
CALCIUM SPEC-SCNC: 8.9 MG/DL (ref 8.6–10.5)
CHLORIDE SERPL-SCNC: 102 MMOL/L (ref 98–107)
CO2 SERPL-SCNC: 31 MMOL/L (ref 22–29)
CREAT SERPL-MCNC: 0.35 MG/DL (ref 0.57–1)
DEPRECATED RDW RBC AUTO: 44.5 FL (ref 37–54)
EGFRCR SERPLBLD CKD-EPI 2021: 102.8 ML/MIN/1.73
ERYTHROCYTE [DISTWIDTH] IN BLOOD BY AUTOMATED COUNT: 14 % (ref 12.3–15.4)
GLUCOSE SERPL-MCNC: 103 MG/DL (ref 65–99)
HCT VFR BLD AUTO: 26.7 % (ref 34–46.6)
HGB BLD-MCNC: 9 G/DL (ref 12–15.9)
MCH RBC QN AUTO: 29.4 PG (ref 26.6–33)
MCHC RBC AUTO-ENTMCNC: 33.7 G/DL (ref 31.5–35.7)
MCV RBC AUTO: 87.3 FL (ref 79–97)
PLATELET # BLD AUTO: 312 10*3/MM3 (ref 140–450)
PMV BLD AUTO: 9.3 FL (ref 6–12)
POTASSIUM SERPL-SCNC: 3.8 MMOL/L (ref 3.5–5.2)
RBC # BLD AUTO: 3.06 10*6/MM3 (ref 3.77–5.28)
SODIUM SERPL-SCNC: 139 MMOL/L (ref 136–145)
WBC NRBC COR # BLD AUTO: 3.95 10*3/MM3 (ref 3.4–10.8)

## 2024-05-07 PROCEDURE — 85027 COMPLETE CBC AUTOMATED: CPT | Performed by: INTERNAL MEDICINE

## 2024-05-07 PROCEDURE — 80048 BASIC METABOLIC PNL TOTAL CA: CPT | Performed by: INTERNAL MEDICINE

## 2024-05-07 RX ORDER — CLONAZEPAM 0.5 MG/1
0.25 TABLET ORAL 2 TIMES DAILY PRN
Qty: 6 TABLET | Refills: 0 | Status: SHIPPED | OUTPATIENT
Start: 2024-05-07

## 2024-05-07 RX ORDER — OXYCODONE HYDROCHLORIDE AND ACETAMINOPHEN 5; 325 MG/1; MG/1
1 TABLET ORAL EVERY 6 HOURS PRN
Qty: 12 TABLET | Refills: 0 | Status: SHIPPED | OUTPATIENT
Start: 2024-05-07

## 2024-05-07 RX ORDER — AMOXICILLIN 250 MG
2 CAPSULE ORAL DAILY PRN
Qty: 60 TABLET | Refills: 0 | Status: SHIPPED | OUTPATIENT
Start: 2024-05-07

## 2024-05-07 RX ORDER — NALOXONE HYDROCHLORIDE 4 MG/.1ML
SPRAY NASAL
Qty: 2 EACH | Refills: 0 | Status: SHIPPED | OUTPATIENT
Start: 2024-05-07

## 2024-05-07 RX ADMIN — OXYCODONE AND ACETAMINOPHEN 1 TABLET: 5; 325 TABLET ORAL at 09:03

## 2024-05-07 RX ADMIN — RIVAROXABAN 10 MG: 10 TABLET, FILM COATED ORAL at 09:03

## 2024-05-07 RX ADMIN — PANTOPRAZOLE SODIUM 40 MG: 40 TABLET, DELAYED RELEASE ORAL at 04:06

## 2024-05-07 RX ADMIN — SENNOSIDES AND DOCUSATE SODIUM 2 TABLET: 50; 8.6 TABLET ORAL at 09:03

## 2024-05-07 RX ADMIN — Medication 10 ML: at 09:05

## 2024-05-07 RX ADMIN — OXYCODONE AND ACETAMINOPHEN 1 TABLET: 5; 325 TABLET ORAL at 04:05

## 2024-05-07 NOTE — DISCHARGE SUMMARY
Date of Admission: 4/30/2024  Date of Discharge:  5/7/2024  Primary Care Physician: Eve Carl, APRN     Discharge Diagnosis:  Active Hospital Problems    Diagnosis  POA    **Closed right hip fracture [S72.001A]  Yes    GERD without esophagitis [K21.9]  Yes    Other fracture of right femur, initial encounter for closed fracture [S72.8X1A]  Yes    Degeneration of lumbar intervertebral disc [M51.36]  Yes    Anxiety [F41.9]  Yes    Chronic back pain [M54.9, G89.29]  Yes    S/P TKR (total knee replacement), right [Z96.651]  Not Applicable      Resolved Hospital Problems   No resolved problems to display.       Presenting Problem/History of Present Illness from H&P:  Hip fracture [S72.009A]     Patient is a 83-year-old female with a history of including but not limited to chronic back pain, GERD, anxiety, total right knee replacement, presented to the outside ED after mechanical fall complaining of right hip pain.  Was found to have distal right hip fracture.  Patient with history of total right knee replacement, and due to complicated surgery that patient will require, case was discussed with Dr. Monte who accepted the patient with plan for surgical intervention after admission. Patient was seen soon after arrival in the room.  Complaining of significant pain, 10 out of 10, was moaning and in significant discomfort.  Denies any other complaints.  No shortness of breath, chest pain, fevers or chills.  No history of heart disease per patient.     Hospital Course:  The patient is a 83 y.o. female who presented with      Right femur fracture: Status post retrograde intramedullary nailing of right distal femur fracture, revision of right total knee replacement, polyethylene liner exchange 05/01.  Prophylaxis with Xarelto 10 mg for 6 weeks or longer if minimally ambulatory.  Follow-up with orthopedic surgery in clinic in 2 to 3 weeks.  ABLA/DUC: FOBT was negative.  Iron saturation of 9% with an elevated ferritin.  Consider oral iron as long as constipation is not continued issue as outpatient.  Constipation: Improved.  Continue bowel regimen.  Dysuria: Urinalysis noninfectious.  Symptoms improved.  GERD: PPI    Exam Today:  Constitutional:       General: She is not in acute distress.     Appearance: She is not diaphoretic.   Cardiovascular:      Rate and Rhythm: Normal rate and regular rhythm.      Pulses: Normal pulses.   Pulmonary:      Effort: Pulmonary effort is normal.      Breath sounds: No wheezing.   Abdominal:      General: There is no distension.      Palpations: Abdomen is soft.      Tenderness: There is no abdominal tenderness. There is no guarding or rebound.   Musculoskeletal:         General: Tenderness present.      Comments: RLE dressed   Skin:     General: Skin is warm and dry.   Neurological:      Mental Status: She is alert. Mental status is at baseline.   Psychiatric:         Mood and Affect: Mood normal.         Behavior: Behavior normal.     Results:  XR R Femur  There is a spiral fracture demonstrated through the distal  right femoral diaphysis extending into the metaphysis with moderate  angulation and fairly significant overlapping of the bone ends with  foreshortening. Fracture line may extend to the femoral component  anteriorly. No soft tissue gas or radiopaque foreign body seen. There is  hip joint narrowing, otherwise within normal limits. Proximal femur is  satisfactory in appearance.    Procedures Performed:  Procedure(s):  RIGHT FEMUR INTRAMEDULLARY NAILING RETROGRADE AND RIGHT KNEE POLY LINER EXCHANGE       Consults:   Consults       Date and Time Order Name Status Description    4/30/2024  8:21 PM Inpatient Orthopedic Surgery Consult Completed              Discharge Disposition:  Skilled Nursing Facility (DC - External)    Discharge Medications:     Discharge Medications        New Medications        Instructions Start Date   naloxone 4 MG/0.1ML nasal spray  Commonly known as: NARCAN   Call  911. Don't prime. Spray in 1 nostril for overdose. Repeat in 2-3 minutes in other nostril if no or minimal breathing/responsiveness.      rivaroxaban 10 MG tablet  Commonly known as: XARELTO   10 mg, Oral, Daily With Breakfast      sennosides-docusate 8.6-50 MG per tablet  Commonly known as: PERICOLACE   2 tablets, Oral, Daily PRN             Changes to Medications        Instructions Start Date   clonazePAM 0.5 MG tablet  Commonly known as: KlonoPIN  What changed: See the new instructions.   0.25 mg, Oral, 2 Times Daily PRN      oxyCODONE-acetaminophen 5-325 MG per tablet  Commonly known as: PERCOCET  What changed: when to take this   1 tablet, Oral, Every 6 Hours PRN             Continue These Medications        Instructions Start Date   acetaminophen 325 MG tablet  Commonly known as: TYLENOL   650 mg, Oral, Every 6 Hours PRN      dicyclomine 10 MG capsule  Commonly known as: BENTYL   TAKE 1 CAPSULE BY MOUTH BEFORE MEAL(S) AND AT BEDTIME AS NEEDED      DULoxetine 30 MG capsule  Commonly known as: CYMBALTA   TK 1 C PO QD      omeprazole 40 MG capsule  Commonly known as: priLOSEC   No dose, route, or frequency recorded.      phenazopyridine 200 MG tablet  Commonly known as: PYRIDIUM   200 mg, Oral, 2 Times Daily             Stop These Medications      HYDROcodone-acetaminophen 5-325 MG per tablet  Commonly known as: NORCO     meloxicam 7.5 MG tablet  Commonly known as: MOBIC              Discharge Diet:   Diet Instructions       Diet: Regular/House Diet; Thin (IDDSI 0)      Discharge Diet: Regular/House Diet    Fluid Consistency: Thin (IDDSI 0)            Activity at Discharge:   Activity Instructions       Other Activity Instructions      Activity Instructions: follow surgery instructions            Follow-up Appointments:   Contact information for follow-up providers       Eve Carl APRN Follow up.    Specialty: Nurse Practitioner  Contact information:  [address] [place] [address] [phone]                Luis Rivas MD Follow up.    Specialties: Orthopedic Surgery, Sports Medicine  Contact information:  [address]  [place] [address]  [phone]                       Contact information for after-discharge care       Destination       [facility] .    Service: Skilled Nursing  Contact information:  [address]  [facility] [address] [phone]                                   Test Results Pending at Discharge:       Oziel Zee MD  05/07/24  08:53 EDT    Time Spent on Discharge Activities: >30 minutes    Dictated portions using Dragon dictation software.

## 2024-05-07 NOTE — CASE MANAGEMENT/SOCIAL WORK
Case Management Discharge Note      Final Note: DC to skilled bed at [facility] via [employer].    Provided Post Acute Provider List?: Yes  Post Acute Provider List: Nursing Home  Delivered To: Support Person  Support Person: bonifacio Aragon [phone]  Method of Delivery: In person    Selected Continued Care - Admitted Since 4/30/2024       Destination Coordination complete.      Service Provider Selected Services Address Phone Fax Patient Preferred    [facility] [address], [facility] [address] [phone] --              Durable Medical Equipment    No services have been selected for the patient.                Dialysis/Infusion    No services have been selected for the patient.                Home Medical Care    No services have been selected for the patient.                Therapy    No services have been selected for the patient.                Community Resources    No services have been selected for the patient.                Community & DME    No services have been selected for the patient.                         Final Discharge Disposition Code: 03 - skilled nursing facility (SNF)

## 2024-05-07 NOTE — PLAN OF CARE
Goal Outcome Evaluation:           Progress: improving  Outcome Evaluation: 82 y/o s/POD 6 R femur IM nailing and R knee poly liner exchange. AOX4, [place]. Pt up w assist x1 to chair/bsc. Voiding function intact. Neuro checks WNL. PIV removed. Dsg changed to bordered dsg, c/d/i. Pain managed via PO pills. Needs met, VSS, RA. Educated pt on d/c instructions. D/C to [facility] via [employer] [facility]. Meds sent to SNF pharmacy. Report given to oncoming staff.

## 2024-05-07 NOTE — PROGRESS NOTES
"Nutrition Services    Patient Name:  Eve Fung  YOB: 1940  MRN: [id number]  Admit Date:  4/30/2024  Assessment Date:  05/07/24    NUTRITION SCREENING      Reason for Encounter LOS   Diagnosis/Problem S/p fall Closed right hip fx  Status post retrograde intramedullary nailing of right distal femur fracture, revision of right total knee replacement, polyethylene liner exchange 05/01.                PO Diet Diet: Regular/House; Fluid Consistency: Thin (IDDSI 0)   Supplements Supplements BID, pt will drink   PO Intake % 25-50%, Po intake improving daily per son       Medications MAR reviewed by RD   Labs  Listed below, reviewed   Physical Findings [place]   GI Function Last BM 5/6   Skin Status Right hip incision       Height  Weight  BMI  Weight Trend     Height: 157.5 cm (62\")  Weight: 63.3 kg (139 lb 8.8 oz) (05/05/24 0600)  Body mass index is 25.52 kg/m².  Stable       Nutrition Problem (PES) Nutrition appropriate for condition at this time as evidenced by tolerating po diet and intake improving.       Intervention/Plan Visited pt and son and discussed po intake/food preferences  RD to follow up per protocol.     Results from last 7 days   Lab Units 05/07/24  0404 05/06/24  0405 05/05/24  0323 05/01/24  0530 04/30/24  2150   SODIUM mmol/L 139 139 139   < > 137   POTASSIUM mmol/L 3.8 4.2 4.0   < > 3.0*   CHLORIDE mmol/L 102 101 99   < > 102   CO2 mmol/L 31.0* 32.0* 32.6*   < > 27.5   BUN mg/dL 8 6* 7*   < > 15   CREATININE mg/dL 0.35* 0.29* 0.33*   < > 0.56*   CALCIUM mg/dL 8.9 8.8 8.8   < > 9.1   BILIRUBIN mg/dL  --   --   --   --  0.7   ALK PHOS U/L  --   --   --   --  69   ALT (SGPT) U/L  --   --   --   --  14   AST (SGOT) U/L  --   --   --   --  20   GLUCOSE mg/dL 103* 99 115*   < > 161*    < > = values in this interval not displayed.     Results from last 7 days   Lab Units 05/07/24  0404 05/06/24  0405 05/05/24  0323 05/04/24  1714 05/04/24  0435 05/02/24  0454 05/01/24  0530 04/30/24  2151 " "04/30/24  2150   MAGNESIUM mg/dL  --   --   --   --  2.0  --  2.0  --  1.8   PHOSPHORUS mg/dL  --   --  3.8  --  2.3*  --  3.3  --  2.8   HEMOGLOBIN g/dL 9.0*   < > 9.1*   < > 8.3*   < > 10.8*   < >  --    HEMATOCRIT % 26.7*   < > 27.1*   < > 24.8*   < > 32.1*   < >  --     < > = values in this interval not displayed.     No results found for: \"HGBA1C\"      Electronically signed by:  Reena Casanova RD  05/07/24 10:20 EDT  "

## 2024-05-07 NOTE — CASE MANAGEMENT/SOCIAL WORK
Continued Stay Note  [facility]     Patient Name: Eve Fung  MRN: [id number]  Today's Date: 5/7/2024    Admit Date: 4/30/2024    Plan: [facility]- pre-cert obtained- [employer] w[facility]c van booked for 5/7 @ 1130.   Discharge Plan       Row Name 05/07/24 1211       Plan    Plan Comments Spoke with patient and she doesn't think she can tolerate riding in a w/c van. [facility] booked for 1200 ( [employer] [id number]). [employer] w[facility] van canceled.                   Discharge Codes    No documentation.                 Expected Discharge Date and Time       Expected Discharge Date Expected Discharge Time    May 7, 2024               Jane Dickey RN

## (undated) DEVICE — COVER,TABLE,HEAVY DUTY,79"X110",STRL: Brand: MEDLINE

## (undated) DEVICE — TBG PENCL TELESCP MEGADYNE SMOKE EVAC 10FT

## (undated) DEVICE — COVER,C-ARM,41X74: Brand: MEDLINE

## (undated) DEVICE — SPNG GZ WOVN 4X4IN 12PLY 10/BX STRL

## (undated) DEVICE — DRESSING,GAUZE,XEROFORM,CURAD,1"X8",ST: Brand: CURAD

## (undated) DEVICE — PK ORTHO MAJ 40

## (undated) DEVICE — DECANTER: Brand: UNBRANDED

## (undated) DEVICE — GLV SURG SENSICARE PI LF PF 7.5 GRN STRL

## (undated) DEVICE — PAD CAST SYN PROTOUCH RL 4IN NS

## (undated) DEVICE — SYR LL TP 10ML STRL

## (undated) DEVICE — DRESSING,GAUZE,XEROFORM,CURAD,5"X9",ST: Brand: CURAD

## (undated) DEVICE — SPNG LAP 18X18IN LF STRL PK/5

## (undated) DEVICE — PAD CAST SPECIST 6IN STRL

## (undated) DEVICE — PROXIMATE RH ROTATING HEAD SKIN STAPLERS (35 WIDE) CONTAINS 35 STAINLESS STEEL STAPLES: Brand: PROXIMATE

## (undated) DEVICE — 4.0MM LONG AO PILOT DRILL: Brand: TRIGEN

## (undated) DEVICE — TRAP FLD MINIVAC MEGADYNE 100ML

## (undated) DEVICE — BNDG ELAS CO-FLEX SLF ADHR 4IN5YD LF STRL

## (undated) DEVICE — 4.0MM SHORT PILOT DRILL WITH AO CONNECTOR: Brand: TRIGEN

## (undated) DEVICE — PCH INST SURG INVISISHIELD 2PCKT

## (undated) DEVICE — GUIDE PIN 3.2MM X 343MM: Brand: TRIGEN

## (undated) DEVICE — DRSNG SLVR/ANTIBAC PRIMASEAL POST/OP ADHS 3.5X10IN

## (undated) DEVICE — 6617 IOBAN II PATIENT ISOLATION DRAPE 5/BX,4BX/CS: Brand: STERI-DRAPE™ IOBAN™ 2

## (undated) DEVICE — DRP C/ARMOR

## (undated) DEVICE — GLV SURG PREMIERPRO ORTHO LTX PF SZ7.5 BRN

## (undated) DEVICE — CVR HNDL LT SURG ACCSSRY BLU STRL

## (undated) DEVICE — 3.0MM X 1000MM BALL TIP GUIDE ROD: Brand: TRIGEN

## (undated) DEVICE — NDL HYPO PRECISIONGLIDE/REG 18G 11/2 PNK

## (undated) DEVICE — ANTIBACTERIAL UNDYED BRAIDED (POLYGLACTIN 910), SYNTHETIC ABSORBABLE SUTURE: Brand: COATED VICRYL

## (undated) DEVICE — PAD,ABDOMINAL,8"X10",ST,LF: Brand: MEDLINE

## (undated) DEVICE — SHEET, DRAPE, SPLIT, STERILE: Brand: MEDLINE

## (undated) DEVICE — BNDG,ELSTC,MATRIX,STRL,6"X5YD,LF,HOOK&LP: Brand: MEDLINE

## (undated) DEVICE — TOWEL,OR,DSP,ST,NATURAL,DLX,4/PK,20PK/CS: Brand: MEDLINE

## (undated) DEVICE — SUT VIC 2/0 CT1 36IN

## (undated) DEVICE — SPNG GZ AVANT 6PLY 4X4IN STRL PK/2

## (undated) DEVICE — BANDAGE,ELASTIC,ESMARK,STERILE,6"X9',LF: Brand: MEDLINE